# Patient Record
Sex: FEMALE | Race: WHITE | Employment: FULL TIME | URBAN - METROPOLITAN AREA
[De-identification: names, ages, dates, MRNs, and addresses within clinical notes are randomized per-mention and may not be internally consistent; named-entity substitution may affect disease eponyms.]

---

## 2017-02-28 ENCOUNTER — OFFICE VISIT (OUTPATIENT)
Dept: DERMATOLOGY | Age: 42
End: 2017-02-28

## 2017-02-28 DIAGNOSIS — L72.0 EPIDERMAL CYST: ICD-10-CM

## 2017-02-28 DIAGNOSIS — D22.9 MULTIPLE NEVI: Primary | ICD-10-CM

## 2017-02-28 DIAGNOSIS — D18.00 ANGIOMA: ICD-10-CM

## 2017-02-28 DIAGNOSIS — D48.5 NEOPLASM OF UNCERTAIN BEHAVIOR OF SKIN: ICD-10-CM

## 2017-02-28 PROCEDURE — 99203 OFFICE O/P NEW LOW 30 MIN: CPT | Performed by: DERMATOLOGY

## 2017-02-28 PROCEDURE — 11100 PR BIOPSY OF SKIN LESION: CPT | Performed by: DERMATOLOGY

## 2017-02-28 RX ORDER — LEVOTHYROXINE SODIUM 0.03 MG/1
25 TABLET ORAL DAILY
COMMUNITY
End: 2021-11-23 | Stop reason: SDUPTHER

## 2017-03-01 ENCOUNTER — HOSPITAL ENCOUNTER (OUTPATIENT)
Dept: OTHER | Age: 42
Discharge: HOME | End: 2017-03-01
Attending: OBSTETRICS & GYNECOLOGY | Admitting: OBSTETRICS & GYNECOLOGY

## 2017-03-01 LAB
T4 FREE: 1.2 NG/DL (ref 0.9–1.8)
TSH SERPL DL<=0.05 MIU/L-ACNC: 4.39 UIU/ML (ref 0.27–4.2)

## 2017-03-06 ENCOUNTER — TELEPHONE (OUTPATIENT)
Dept: DERMATOLOGY | Age: 42
End: 2017-03-06

## 2017-07-10 ENCOUNTER — PROCEDURE VISIT (OUTPATIENT)
Dept: DERMATOLOGY | Age: 42
End: 2017-07-10

## 2017-07-10 DIAGNOSIS — L72.0 EPIDERMAL CYST OF FACE: Primary | ICD-10-CM

## 2017-07-10 DIAGNOSIS — R20.9 DISTURBANCE OF SKIN SENSATION: ICD-10-CM

## 2017-07-10 PROCEDURE — 11441 EXC FACE-MM B9+MARG 0.6-1 CM: CPT | Performed by: DERMATOLOGY

## 2017-07-14 ENCOUNTER — TELEPHONE (OUTPATIENT)
Dept: DERMATOLOGY | Age: 42
End: 2017-07-14

## 2018-04-05 ENCOUNTER — HOSPITAL ENCOUNTER (OUTPATIENT)
Dept: MAMMOGRAPHY | Age: 43
Discharge: HOME | End: 2018-04-05
Admitting: OBSTETRICS & GYNECOLOGY

## 2018-04-05 DIAGNOSIS — Z12.39 BREAST CANCER SCREENING: ICD-10-CM

## 2018-04-05 DIAGNOSIS — N63.21 BREAST LUMP ON LEFT SIDE AT 1 O'CLOCK POSITION: ICD-10-CM

## 2018-04-13 ENCOUNTER — OFFICE VISIT (OUTPATIENT)
Dept: ORTHOPEDIC SURGERY | Age: 43
End: 2018-04-13

## 2018-04-13 VITALS
HEART RATE: 68 BPM | DIASTOLIC BLOOD PRESSURE: 71 MMHG | HEIGHT: 64 IN | BODY MASS INDEX: 27.31 KG/M2 | WEIGHT: 160 LBS | SYSTOLIC BLOOD PRESSURE: 134 MMHG

## 2018-04-13 DIAGNOSIS — M22.2X2 PATELLOFEMORAL SYNDROME, BILATERAL: ICD-10-CM

## 2018-04-13 DIAGNOSIS — M25.561 PAIN IN BOTH KNEES, UNSPECIFIED CHRONICITY: Primary | ICD-10-CM

## 2018-04-13 DIAGNOSIS — M25.562 PAIN IN BOTH KNEES, UNSPECIFIED CHRONICITY: Primary | ICD-10-CM

## 2018-04-13 DIAGNOSIS — M22.2X1 PATELLOFEMORAL SYNDROME, BILATERAL: ICD-10-CM

## 2018-04-13 PROCEDURE — 99213 OFFICE O/P EST LOW 20 MIN: CPT | Performed by: PHYSICIAN ASSISTANT

## 2018-04-13 RX ORDER — MELOXICAM 15 MG/1
TABLET ORAL
Qty: 30 TABLET | Refills: 3 | Status: SHIPPED | OUTPATIENT
Start: 2018-04-13 | End: 2020-09-18

## 2018-05-19 ENCOUNTER — EMPLOYEE WELLNESS (OUTPATIENT)
Dept: OTHER | Age: 43
End: 2018-05-19

## 2018-05-19 LAB
CHOLESTEROL, TOTAL: 182 MG/DL (ref 0–199)
GLUCOSE BLD-MCNC: 84 MG/DL (ref 70–99)
HDLC SERPL-MCNC: 45 MG/DL (ref 40–60)
LDL CHOLESTEROL CALCULATED: 123 MG/DL
TRIGL SERPL-MCNC: 71 MG/DL (ref 0–150)

## 2018-05-29 VITALS — WEIGHT: 162 LBS | BODY MASS INDEX: 27.81 KG/M2

## 2018-11-19 ENCOUNTER — OFFICE VISIT (OUTPATIENT)
Dept: ORTHOPEDIC SURGERY | Age: 43
End: 2018-11-19
Payer: COMMERCIAL

## 2018-11-19 VITALS
SYSTOLIC BLOOD PRESSURE: 127 MMHG | HEART RATE: 66 BPM | WEIGHT: 164 LBS | BODY MASS INDEX: 28 KG/M2 | HEIGHT: 64 IN | DIASTOLIC BLOOD PRESSURE: 83 MMHG

## 2018-11-19 DIAGNOSIS — M25.512 PAIN OF LEFT SHOULDER REGION: Primary | ICD-10-CM

## 2018-11-19 DIAGNOSIS — M75.82 ROTATOR CUFF TENDINITIS, LEFT: ICD-10-CM

## 2018-11-19 PROCEDURE — 99214 OFFICE O/P EST MOD 30 MIN: CPT | Performed by: ORTHOPAEDIC SURGERY

## 2018-11-19 NOTE — PROGRESS NOTES
CHIEF COMPLAINT:    Chief Complaint   Patient presents with    Shoulder Pain     LEFT SHOULDER PAIN FOR A FEW MONTHS. NO JYOTI. HISTORY OF PRESENT ILLNESS:                The patient is a 37 y.o. female presents to clinic for evaluation of left shoulder pain. This pain began a few months ago with no injury. The symptoms have been gradually worsening with time. She points to the lateral aspect of the shoulders the primary location for her pain. Pain is worsened with overhead movements and external rotation. She does admit to some sleep disturbances. She is left-handed. Past Medical History:   Diagnosis Date    Anemia     iron supplement    Headache(784.0)     Herpes simplex without mention of complication     last outbreak 2 months ago      Hypothyroidism           The pain assessment was noted & is as follows:  Pain Assessment  Location of Pain: Shoulder  Location Modifiers: Left  Severity of Pain: 6  Quality of Pain: Aching  Duration of Pain: Persistent  Frequency of Pain: Constant]      Work Status/Functionality: She works as a respiratory therapist at [employer]. Past Medical History: Medical history form was reviewed today & can be found in the media tab  Past Medical History:   Diagnosis Date    Anemia     iron supplement    Headache(784.0)     Herpes simplex without mention of complication     last outbreak 2 months ago      Hypothyroidism       Past Surgical History:     Past Surgical History:   Procedure Laterality Date    CHOLECYSTECTOMY, LAPAROSCOPIC  2007    DILATION AND CURETTAGE OF UTERUS  2009    HERNIA REPAIR      KNEE SURGERY  2005    rt knee surgery     Current Medications:     Current Outpatient Prescriptions:     meloxicam (MOBIC) 15 MG tablet, 1 po qd, Disp: 30 tablet, Rfl: 3    levothyroxine (SYNTHROID) 25 MCG tablet, Take 25 mcg by mouth Daily, Disp: , Rfl:     rizatriptan (MAXALT) 10 MG tablet, Take 10 mg by mouth once as needed for Migraine.  May repeat

## 2018-11-29 ENCOUNTER — HOSPITAL ENCOUNTER (OUTPATIENT)
Dept: PHYSICAL THERAPY | Age: 43
Setting detail: THERAPIES SERIES
Discharge: HOME | End: 2018-11-29
Payer: COMMERCIAL

## 2018-11-29 PROCEDURE — G8984 CARRY CURRENT STATUS: HCPCS | Performed by: PHYSICAL THERAPIST

## 2018-11-29 PROCEDURE — 97161 PT EVAL LOW COMPLEX 20 MIN: CPT | Performed by: PHYSICAL THERAPIST

## 2018-11-29 PROCEDURE — 97110 THERAPEUTIC EXERCISES: CPT | Performed by: PHYSICAL THERAPIST

## 2018-11-29 PROCEDURE — G8985 CARRY GOAL STATUS: HCPCS | Performed by: PHYSICAL THERAPIST

## 2018-11-29 NOTE — PLAN OF CARE
Limitation: Carrying, moving and handling objects  Carrying, Moving and Handling Objects Current Status (): At least 80 percent but less than 100 percent impaired, limited or restricted  Carrying, Moving and Handling Objects Goal Status (): At least 20 percent but less than 40 percent impaired, limited or restricted    Pain Scale: 3-8/10  Easing factors: no relief. Provocative factors: sleeping, overhead movements, reaching, position changes, self care, lifting, driving. Type: []Constant   []Intermittent  []Radiating []Localized []other:     Numbness/Tingling: intermittent. Occupation/School: Respiratory Therapy. Living Status/Prior Level of Function: Independent with ADLs and IADLs, cardio, walking, lift weights, swim, bike. OBJECTIVE:     CERV ROM     Cervical Flexion 40    Cervical Extension 40    Cervical SB 30 30   Cervical rotation          ROM Left Right   Shoulder Flex 151  AROM    Shoulder Abd 130  AROM    Shoulder ER 85    Shoulder IR 64                   Strength  Left Right   Shoulder Flex 3    Shoulder Scap 3    Shoulder ER 5    Shoulder IR 5    Empty can 3+             Reflexes/Sensation:    [x]Dermatomes/Myotomes intact    []Reflexes equal and normal bilaterally   []Other:    Joint mobility:    [x]Normal    []Hypo   []Hyper    Palpation: tender along acromion    Functional Mobility/Transfers: Independent    Posture: guarded. Bandages/Dressings/Incisions: n/a    Gait: (include devices/WB status): WNL    Orthopedic Special Tests: + empty can.  + neer's                         [x] Patient history, allergies, meds reviewed. Medical chart reviewed. See intake form. Review Of Systems (ROS):  [x]Performed Review of systems (Integumentary, CardioPulmonary, Neurological) by intake and observation. Intake form has been scanned into medical record.  Patient has been instructed to contact their primary care physician regarding ROS issues if not already being addressed at restricted    ASSESSMENT:   Functional Impairments   []Noted spinal or UE joint hypomobility   []Noted spinal or UE joint hypermobility   [x]Decreased UE functional ROM   [x]Decreased UE functional strength   []Abnormal reflexes/sensation/myotomal/dermatomal deficits   [x]Decreased RC/scapular/core strength and neuromuscular control   []other:      Functional Activity Limitations (from functional questionnaire and intake)   [x]Reduced ability to tolerate prolonged functional positions   [x]Reduced ability or difficulty with changes of positions or transfers between positions   [x]Reduced ability to maintain good posture and demonstrate good body mechanics with sitting, bending, and lifting   [] Reduced ability or tolerance with driving and/or computer work   [x]Reduced ability to sleep   [x]Reduced ability to perform lifting, reaching, carrying tasks   [x]Reduced ability to tolerate impact through UE   [x]Reduced ability to reach behind back   [x]Reduced ability to  or hold objects   []Reduced ability to throw or toss an object   []other:    Participation Restrictions   [x]Reduced participation in self care activities   [x]Reduced participation in home management activities   [x]Reduced participation in work activities   [x]Reduced participation in social activities. [x]Reduced participation in sport/recreation activities. Classification:   []Signs/symptoms consistent with post-surgical status including decreased ROM, strength and function.   []Signs/symptoms consistent with joint sprain/strain   [x]Signs/symptoms consistent with shoulder impingement   [x]Signs/symptoms consistent with shoulder/elbow/wrist tendinopathy   []Signs/symptoms consistent with Rotator cuff tear   []Signs/symptoms consistent with labral tear   []Signs/symptoms consistent with postural dysfunction    []Signs/symptoms consistent with Glenohumeral IR Deficit - <45 degrees   []Signs/symptoms consistent with facet dysfunction of cervical/thoracic spine    []Signs/symptoms consistent with pathology which may benefit from Dry needling     []other:     Prognosis/Rehab Potential:      []Excellent   [x]Good    []Fair   []Poor    Tolerance of evaluation/treatment:    []Excellent   [x]Good    []Fair   []Poor  Physical Therapy Evaluation Complexity Justification  [x] A history of present problem with:  [x] no personal factors and/or comorbidities that impact the plan of care;  []1-2 personal factors and/or comorbidities that impact the plan of care  []3 personal factors and/or comorbidities that impact the plan of care  [x] An examination of body systems using standardized tests and measures addressing any of the following: body structures and functions (impairments), activity limitations, and/or participation restrictions;:  [] a total of 1-2 or more elements   [x] a total of 3 or more elements   [] a total of 4 or more elements   [x] A clinical presentation with:  [x] stable and/or uncomplicated characteristics   [] evolving clinical presentation with changing characteristics  [] unstable and unpredictable characteristics;   [x] Clinical decision making of [x] low, [] moderate, [] high complexity using standardized patient assessment instrument and/or measurable assessment of functional outcome. [x] EVAL (LOW) 00367 (typically 20 minutes face-to-face)  [] EVAL (MOD) 47998 (typically 30 minutes face-to-face)  [] EVAL (HIGH) 76442 (typically 45 minutes face-to-face)  [] RE-EVAL       PLAN:  Frequency/Duration:  2 days per week for 4 Weeks:  INTERVENTIONS:  [x] Therapeutic exercise including: strength training, ROM, for Upper extremity and core   [x]  NMR activation and proprioception for UE, scap and Core   [x] Manual therapy as indicated for shoulder, scapula and spine to include: Dry Needling/IASTM, STM, PROM, Gr I-IV mobilizations, manipulation.    [x] Modalities as needed that may include: thermal agents, E-stim, Biofeedback, US,

## 2018-12-04 ENCOUNTER — HOSPITAL ENCOUNTER (OUTPATIENT)
Dept: PHYSICAL THERAPY | Age: 43
Setting detail: THERAPIES SERIES
Discharge: HOME | End: 2018-12-04
Payer: COMMERCIAL

## 2018-12-04 PROCEDURE — 97140 MANUAL THERAPY 1/> REGIONS: CPT | Performed by: PHYSICAL THERAPIST

## 2018-12-04 PROCEDURE — 97110 THERAPEUTIC EXERCISES: CPT | Performed by: PHYSICAL THERAPIST

## 2018-12-04 NOTE — FLOWSHEET NOTE
used Initial score Current Score   Pain Summary VAS 3-8    Functional questionnaire QDash 80    ROM flexion 151     ER 85          IR 64    Strength flexion 3     ER      ABD 3     IR        RESTRICTIONS/PRECAUTIONS:     Exercises/Interventions:   Therapeutic Ex Sets/reps Notes        Supine assisted flex X 10 hep   Supine R/S  4 directions X 20 hep   SBS  NMES  4 min Hep   No $ NMES 4 min iso Hep`   Table slides X 10 hep   pendulum X 10 hep   SL scap set X 10  :05         SB ball roll on table X 20     Bent over row/  ext X 20/   X 20  hep        submax iso   IR/ ER X 10  :05 hep                                 Manual Intervention     PROM/  Joint oscillation 4 min. NMR re-education                                                 Therapeutic Exercise and NMR EXR  [x] (25403) Provided verbal/tactile cueing for activities related to strengthening, flexibility, endurance, ROM  for improvements in scapular, scapulothoracic and UE control with self care, reaching, carrying, lifting, house/yardwork, driving/computer work.    [] (64348) Provided verbal/tactile cueing for activities related to improving balance, coordination, kinesthetic sense, posture, motor skill, proprioception  to assist with  scapular, scapulothoracic and UE control with self care, reaching, carrying, lifting, house/yardwork, driving/computer work. Therapeutic Activities:    [] (82160 or 97190) Provided verbal/tactile cueing for activities related to improving balance, coordination, kinesthetic sense, posture, motor skill, proprioception and motor activation to allow for proper function of scapular, scapulothoracic and UE control with self care, carrying, lifting, driving/computer work.      Home Exercise Program:    [x] (79194) Reviewed/Progressed HEP activities related to strengthening, flexibility, endurance, ROM of scapular, scapulothoracic and UE control with self care, reaching, carrying, lifting,

## 2018-12-06 ENCOUNTER — HOSPITAL ENCOUNTER (OUTPATIENT)
Dept: PHYSICAL THERAPY | Age: 43
Setting detail: THERAPIES SERIES
Discharge: HOME | End: 2018-12-06
Payer: COMMERCIAL

## 2018-12-06 PROCEDURE — 97140 MANUAL THERAPY 1/> REGIONS: CPT | Performed by: PHYSICAL THERAPIST

## 2018-12-06 PROCEDURE — 97110 THERAPEUTIC EXERCISES: CPT | Performed by: PHYSICAL THERAPIST

## 2018-12-06 NOTE — FLOWSHEET NOTE
Summary VAS 3-8 4-5   Functional questionnaire QDash 80    ROM flexion 151     ER 85          IR 64    Strength flexion 3     ER      ABD 3     IR        RESTRICTIONS/PRECAUTIONS:     Exercises/Interventions:     See ATC sheet  Therapeutic Ex Sets/reps Notes        Supine assisted flex X 10 hep   Supine R/S  4 directions 3#   X 20 hep   SBS  NMES  4 min Hep   No $ NMES 4 min iso Hep`   hep   hep   SL scap set X 10  :05    SL ERC 1# 2 x 10    SL abd to 45 X 20    Prone ext X 15     SB ball roll on table X 20     Bent over row/  ext X 20/   X 20  hep        submax iso   IR/ ER X 10  :05 hep                                 Manual Intervention     PROM/  Joint mobs inf and post 8 min. NMR re-education                   Therapeutic Exercise and NMR EXR  [x] (91460) Provided verbal/tactile cueing for activities related to strengthening, flexibility, endurance, ROM  for improvements in scapular, scapulothoracic and UE control with self care, reaching, carrying, lifting, house/yardwork, driving/computer work.    [] (01045) Provided verbal/tactile cueing for activities related to improving balance, coordination, kinesthetic sense, posture, motor skill, proprioception  to assist with  scapular, scapulothoracic and UE control with self care, reaching, carrying, lifting, house/yardwork, driving/computer work. Therapeutic Activities:    [] (81665 or 65393) Provided verbal/tactile cueing for activities related to improving balance, coordination, kinesthetic sense, posture, motor skill, proprioception and motor activation to allow for proper function of scapular, scapulothoracic and UE control with self care, carrying, lifting, driving/computer work.      Home Exercise Program:    [x] (23057) Reviewed/Progressed HEP activities related to strengthening, flexibility, endurance, ROM of scapular, scapulothoracic and UE control with self care, reaching, carrying, lifting, house/yardwork, for proper joint functioning as indicated by patients Functional Deficits. 3. Patient will demonstrate an increase in Strength to 4+/5 in flex and scap to allow for proper functional mobility as indicated by patients Functional Deficits. 4. Patient will return to sleeping without increased symptoms or restriction. 5. Able to don and doff bra  And style hair without left shoulder pain. New or Updated Goals (if applicable):  [x] No change to goals established upon initial eval/last progress note:  New Goals:    Progression Towards Functional goals:   [] Patient is progressing as expected towards functional goals listed. [] Progression is slowed due to complexities listed. [] Progression has been slowed due to co-morbidities.   [x] Plan just implemented, too soon to assess goals progression  [] Other:     ASSESSMENT:    [] Improvement noted relative to goals:  [] No Improvement noted related to goals:  Summary/Patient's response to treatment: See Eval    Treatment/Activity Tolerance:  [x] Patient tolerated treatment well [] Patient limited by fatique  [] Patient limited by pain  [] Patient limited by other medical complications  [] Other:     Prognosis: [x] Good [] Fair  [] Poor    Patient Requires Follow-up: [x] Yes  [] No    PLAN: See eval  [x] Continue per plan of care [] Alter current plan (see comments)  [] Plan of care initiated [] Hold pending MD visit [] Discharge    Electronically signed by: Tarri Litten, PT

## 2018-12-11 ENCOUNTER — APPOINTMENT (OUTPATIENT)
Dept: PHYSICAL THERAPY | Age: 43
End: 2018-12-11
Payer: COMMERCIAL

## 2018-12-17 ENCOUNTER — APPOINTMENT (OUTPATIENT)
Dept: PHYSICAL THERAPY | Age: 43
End: 2018-12-17
Payer: COMMERCIAL

## 2019-05-13 ENCOUNTER — EMPLOYEE WELLNESS (OUTPATIENT)
Dept: OTHER | Age: 44
End: 2019-05-13

## 2019-05-13 LAB
CHOLESTEROL, TOTAL: 188 MG/DL (ref 0–199)
GLUCOSE BLD-MCNC: 83 MG/DL (ref 70–99)
HDLC SERPL-MCNC: 52 MG/DL (ref 40–60)
LDL CHOLESTEROL CALCULATED: 115 MG/DL
TRIGL SERPL-MCNC: 103 MG/DL (ref 0–150)

## 2019-05-28 VITALS — WEIGHT: 169 LBS | BODY MASS INDEX: 28.99 KG/M2

## 2019-10-10 ENCOUNTER — HOSPITAL ENCOUNTER (OUTPATIENT)
Dept: WOMENS IMAGING | Age: 44
Discharge: HOME | End: 2019-10-10
Payer: COMMERCIAL

## 2019-10-10 DIAGNOSIS — Z12.31 ENCOUNTER FOR SCREENING MAMMOGRAM FOR MALIGNANT NEOPLASM OF BREAST: ICD-10-CM

## 2019-10-10 PROCEDURE — 77067 SCR MAMMO BI INCL CAD: CPT

## 2019-10-23 ENCOUNTER — HOSPITAL ENCOUNTER (OUTPATIENT)
Dept: WOMENS IMAGING | Age: 44
Discharge: HOME | End: 2019-10-23
Payer: COMMERCIAL

## 2019-10-23 DIAGNOSIS — R92.8 ABNORMAL MAMMOGRAM: ICD-10-CM

## 2019-10-23 PROCEDURE — G0279 TOMOSYNTHESIS, MAMMO: HCPCS

## 2019-10-23 PROCEDURE — 76642 ULTRASOUND BREAST LIMITED: CPT

## 2020-02-25 ENCOUNTER — HOSPITAL ENCOUNTER (OUTPATIENT)
Age: 45
Discharge: HOME | End: 2020-02-25
Payer: COMMERCIAL

## 2020-02-25 LAB
BASOPHILS ABSOLUTE: 0 K/UL (ref 0–0.2)
BASOPHILS RELATIVE PERCENT: 0.6 %
EOSINOPHILS ABSOLUTE: 0.1 K/UL (ref 0–0.6)
EOSINOPHILS RELATIVE PERCENT: 1.7 %
HCT VFR BLD CALC: 41.1 % (ref 36–48)
HEMOGLOBIN: 13.5 G/DL (ref 12–16)
LYMPHOCYTES ABSOLUTE: 1.4 K/UL (ref 1–5.1)
LYMPHOCYTES RELATIVE PERCENT: 21.8 %
MCH RBC QN AUTO: 28.1 PG (ref 26–34)
MCHC RBC AUTO-ENTMCNC: 32.8 G/DL (ref 31–36)
MCV RBC AUTO: 85.7 FL (ref 80–100)
MONOCYTES ABSOLUTE: 0.6 K/UL (ref 0–1.3)
MONOCYTES RELATIVE PERCENT: 9 %
NEUTROPHILS ABSOLUTE: 4.3 K/UL (ref 1.7–7.7)
NEUTROPHILS RELATIVE PERCENT: 66.9 %
PDW BLD-RTO: 18.2 % (ref 12.4–15.4)
PLATELET # BLD: 258 K/UL (ref 135–450)
PMV BLD AUTO: 9.1 FL (ref 5–10.5)
RBC # BLD: 4.8 M/UL (ref 4–5.2)
T4 FREE: 1.1 NG/DL (ref 0.9–1.8)
TSH SERPL DL<=0.05 MIU/L-ACNC: 8.01 UIU/ML (ref 0.27–4.2)
WBC # BLD: 6.4 K/UL (ref 4–11)

## 2020-02-25 PROCEDURE — 36415 COLL VENOUS BLD VENIPUNCTURE: CPT

## 2020-02-25 PROCEDURE — 84439 ASSAY OF FREE THYROXINE: CPT

## 2020-02-25 PROCEDURE — 85025 COMPLETE CBC W/AUTO DIFF WBC: CPT

## 2020-02-25 PROCEDURE — 84443 ASSAY THYROID STIM HORMONE: CPT

## 2020-05-29 ENCOUNTER — HOSPITAL ENCOUNTER (OUTPATIENT)
Age: 45
Discharge: HOME | End: 2020-05-29
Payer: COMMERCIAL

## 2020-05-29 ENCOUNTER — HOSPITAL ENCOUNTER (OUTPATIENT)
Dept: GENERAL RADIOLOGY | Age: 45
Discharge: HOME | End: 2020-05-29
Payer: COMMERCIAL

## 2020-05-29 PROCEDURE — 73630 X-RAY EXAM OF FOOT: CPT

## 2020-09-03 ENCOUNTER — HOSPITAL ENCOUNTER (OUTPATIENT)
Dept: MRI IMAGING | Age: 45
Discharge: HOME | End: 2020-09-03
Payer: COMMERCIAL

## 2020-09-03 PROCEDURE — 73721 MRI JNT OF LWR EXTRE W/O DYE: CPT

## 2020-09-16 ENCOUNTER — HOSPITAL ENCOUNTER (OUTPATIENT)
Age: 45
Discharge: HOME | End: 2020-09-16
Payer: COMMERCIAL

## 2020-09-16 PROCEDURE — U0003 INFECTIOUS AGENT DETECTION BY NUCLEIC ACID (DNA OR RNA); SEVERE ACUTE RESPIRATORY SYNDROME CORONAVIRUS 2 (SARS-COV-2) (CORONAVIRUS DISEASE [COVID-19]), AMPLIFIED PROBE TECHNIQUE, MAKING USE OF HIGH THROUGHPUT TECHNOLOGIES AS DESCRIBED BY CMS-2020-01-R: HCPCS

## 2020-09-17 ENCOUNTER — ANESTHESIA EVENT (OUTPATIENT)
Dept: OPERATING ROOM | Age: 45
End: 2020-09-17
Payer: COMMERCIAL

## 2020-09-18 LAB — SARS-COV-2, NAA: NOT DETECTED

## 2020-09-18 RX ORDER — FLUTICASONE PROPIONATE 50 MCG
1 SPRAY, SUSPENSION (ML) NASAL DAILY
COMMUNITY

## 2020-09-18 RX ORDER — OMEPRAZOLE 20 MG/1
40 CAPSULE, DELAYED RELEASE ORAL DAILY
Status: ON HOLD | COMMUNITY
End: 2020-09-22

## 2020-09-18 RX ORDER — FERROUS SULFATE 325(65) MG
325 TABLET ORAL 2 TIMES DAILY
COMMUNITY
End: 2022-01-27

## 2020-09-18 RX ORDER — ALBUTEROL SULFATE 90 UG/1
2 AEROSOL, METERED RESPIRATORY (INHALATION) EVERY 6 HOURS PRN
COMMUNITY

## 2020-09-22 ENCOUNTER — HOSPITAL ENCOUNTER (OUTPATIENT)
Age: 45
Setting detail: OUTPATIENT SURGERY
Discharge: HOME | End: 2020-09-22
Attending: PODIATRIST | Admitting: PODIATRIST
Payer: COMMERCIAL

## 2020-09-22 ENCOUNTER — ANESTHESIA (OUTPATIENT)
Dept: OPERATING ROOM | Age: 45
End: 2020-09-22
Payer: COMMERCIAL

## 2020-09-22 VITALS
HEIGHT: 64 IN | TEMPERATURE: 98 F | HEART RATE: 60 BPM | DIASTOLIC BLOOD PRESSURE: 68 MMHG | BODY MASS INDEX: 23.9 KG/M2 | RESPIRATION RATE: 14 BRPM | SYSTOLIC BLOOD PRESSURE: 103 MMHG | WEIGHT: 140 LBS | OXYGEN SATURATION: 100 %

## 2020-09-22 VITALS
RESPIRATION RATE: 16 BRPM | SYSTOLIC BLOOD PRESSURE: 105 MMHG | OXYGEN SATURATION: 100 % | DIASTOLIC BLOOD PRESSURE: 57 MMHG

## 2020-09-22 PROBLEM — M25.371 RIGHT ANKLE INSTABILITY: Status: ACTIVE | Noted: 2020-09-22

## 2020-09-22 LAB — PREGNANCY, URINE: NEGATIVE

## 2020-09-22 PROCEDURE — 2500000003 HC RX 250 WO HCPCS: Performed by: ANESTHESIOLOGY

## 2020-09-22 PROCEDURE — 2780000010 HC IMPLANT OTHER: Performed by: PODIATRIST

## 2020-09-22 PROCEDURE — 88305 TISSUE EXAM BY PATHOLOGIST: CPT

## 2020-09-22 PROCEDURE — 7100000000 HC PACU RECOVERY - FIRST 15 MIN: Performed by: PODIATRIST

## 2020-09-22 PROCEDURE — 7100000001 HC PACU RECOVERY - ADDTL 15 MIN: Performed by: PODIATRIST

## 2020-09-22 PROCEDURE — 2709999900 HC NON-CHARGEABLE SUPPLY: Performed by: PODIATRIST

## 2020-09-22 PROCEDURE — 3600000003 HC SURGERY LEVEL 3 BASE: Performed by: PODIATRIST

## 2020-09-22 PROCEDURE — 6360000002 HC RX W HCPCS: Performed by: NURSE ANESTHETIST, CERTIFIED REGISTERED

## 2020-09-22 PROCEDURE — 76942 ECHO GUIDE FOR BIOPSY: CPT | Performed by: ANESTHESIOLOGY

## 2020-09-22 PROCEDURE — C1713 ANCHOR/SCREW BN/BN,TIS/BN: HCPCS | Performed by: PODIATRIST

## 2020-09-22 PROCEDURE — 3700000000 HC ANESTHESIA ATTENDED CARE: Performed by: PODIATRIST

## 2020-09-22 PROCEDURE — 2580000003 HC RX 258: Performed by: ANESTHESIOLOGY

## 2020-09-22 PROCEDURE — 6360000002 HC RX W HCPCS: Performed by: PODIATRIST

## 2020-09-22 PROCEDURE — 3700000001 HC ADD 15 MINUTES (ANESTHESIA): Performed by: PODIATRIST

## 2020-09-22 PROCEDURE — 64447 NJX AA&/STRD FEMORAL NRV IMG: CPT | Performed by: ANESTHESIOLOGY

## 2020-09-22 PROCEDURE — 84703 CHORIONIC GONADOTROPIN ASSAY: CPT

## 2020-09-22 PROCEDURE — 7100000011 HC PHASE II RECOVERY - ADDTL 15 MIN: Performed by: PODIATRIST

## 2020-09-22 PROCEDURE — 6360000002 HC RX W HCPCS: Performed by: ANESTHESIOLOGY

## 2020-09-22 PROCEDURE — 2580000003 HC RX 258: Performed by: PODIATRIST

## 2020-09-22 PROCEDURE — 3600000013 HC SURGERY LEVEL 3 ADDTL 15MIN: Performed by: PODIATRIST

## 2020-09-22 PROCEDURE — 7100000010 HC PHASE II RECOVERY - FIRST 15 MIN: Performed by: PODIATRIST

## 2020-09-22 PROCEDURE — 2500000003 HC RX 250 WO HCPCS: Performed by: NURSE ANESTHETIST, CERTIFIED REGISTERED

## 2020-09-22 DEVICE — IMPLANTABLE DEVICE: Type: IMPLANTABLE DEVICE | Site: ANKLE | Status: FUNCTIONAL

## 2020-09-22 DEVICE — ANCHOR SUT NDL DX FIBERTAK: Type: IMPLANTABLE DEVICE | Site: ANKLE | Status: FUNCTIONAL

## 2020-09-22 DEVICE — DEVICE GRFT FIX FOR LIGMNT AUG ANCHR REP PEEK INT BRAC: Type: IMPLANTABLE DEVICE | Site: ANKLE | Status: FUNCTIONAL

## 2020-09-22 RX ORDER — PROPOFOL 10 MG/ML
INJECTION, EMULSION INTRAVENOUS PRN
Status: DISCONTINUED | OUTPATIENT
Start: 2020-09-22 | End: 2020-09-22 | Stop reason: SDUPTHER

## 2020-09-22 RX ORDER — RIVAROXABAN 10 MG/1
10 TABLET, FILM COATED ORAL
Qty: 14 TABLET | Refills: 0 | Status: SHIPPED | OUTPATIENT
Start: 2020-09-22 | End: 2021-11-23 | Stop reason: ALTCHOICE

## 2020-09-22 RX ORDER — SODIUM CHLORIDE 0.9 % (FLUSH) 0.9 %
10 SYRINGE (ML) INJECTION EVERY 12 HOURS SCHEDULED
Status: DISCONTINUED | OUTPATIENT
Start: 2020-09-22 | End: 2020-09-22 | Stop reason: HOSPADM

## 2020-09-22 RX ORDER — GLYCOPYRROLATE 0.2 MG/ML
INJECTION INTRAMUSCULAR; INTRAVENOUS PRN
Status: DISCONTINUED | OUTPATIENT
Start: 2020-09-22 | End: 2020-09-22 | Stop reason: SDUPTHER

## 2020-09-22 RX ORDER — MIDAZOLAM HYDROCHLORIDE 1 MG/ML
INJECTION INTRAMUSCULAR; INTRAVENOUS
Status: COMPLETED
Start: 2020-09-22 | End: 2020-09-22

## 2020-09-22 RX ORDER — MAGNESIUM HYDROXIDE 1200 MG/15ML
LIQUID ORAL CONTINUOUS PRN
Status: COMPLETED | OUTPATIENT
Start: 2020-09-22 | End: 2020-09-22

## 2020-09-22 RX ORDER — BUPIVACAINE HYDROCHLORIDE 2.5 MG/ML
INJECTION, SOLUTION EPIDURAL; INFILTRATION; INTRACAUDAL
Status: COMPLETED
Start: 2020-09-22 | End: 2020-09-22

## 2020-09-22 RX ORDER — DEXAMETHASONE SODIUM PHOSPHATE 4 MG/ML
INJECTION, SOLUTION INTRA-ARTICULAR; INTRALESIONAL; INTRAMUSCULAR; INTRAVENOUS; SOFT TISSUE PRN
Status: DISCONTINUED | OUTPATIENT
Start: 2020-09-22 | End: 2020-09-22 | Stop reason: SDUPTHER

## 2020-09-22 RX ORDER — OXYCODONE HYDROCHLORIDE AND ACETAMINOPHEN 5; 325 MG/1; MG/1
1 TABLET ORAL EVERY 6 HOURS PRN
Qty: 20 TABLET | Refills: 0 | Status: SHIPPED | OUTPATIENT
Start: 2020-09-22 | End: 2020-09-27

## 2020-09-22 RX ORDER — FENTANYL CITRATE 50 UG/ML
INJECTION, SOLUTION INTRAMUSCULAR; INTRAVENOUS PRN
Status: DISCONTINUED | OUTPATIENT
Start: 2020-09-22 | End: 2020-09-22 | Stop reason: SDUPTHER

## 2020-09-22 RX ORDER — LIDOCAINE HYDROCHLORIDE 20 MG/ML
INJECTION, SOLUTION INFILTRATION; PERINEURAL PRN
Status: DISCONTINUED | OUTPATIENT
Start: 2020-09-22 | End: 2020-09-22 | Stop reason: SDUPTHER

## 2020-09-22 RX ORDER — SODIUM CHLORIDE, SODIUM LACTATE, POTASSIUM CHLORIDE, CALCIUM CHLORIDE 600; 310; 30; 20 MG/100ML; MG/100ML; MG/100ML; MG/100ML
INJECTION, SOLUTION INTRAVENOUS CONTINUOUS
Status: DISCONTINUED | OUTPATIENT
Start: 2020-09-22 | End: 2020-09-22 | Stop reason: HOSPADM

## 2020-09-22 RX ORDER — BUPIVACAINE HYDROCHLORIDE 5 MG/ML
INJECTION, SOLUTION EPIDURAL; INTRACAUDAL PRN
Status: DISCONTINUED | OUTPATIENT
Start: 2020-09-22 | End: 2020-09-22 | Stop reason: SDUPTHER

## 2020-09-22 RX ORDER — BUPIVACAINE HYDROCHLORIDE 5 MG/ML
INJECTION, SOLUTION EPIDURAL; INTRACAUDAL
Status: COMPLETED
Start: 2020-09-22 | End: 2020-09-22

## 2020-09-22 RX ORDER — BUPIVACAINE HYDROCHLORIDE 2.5 MG/ML
INJECTION, SOLUTION EPIDURAL; INFILTRATION; INTRACAUDAL PRN
Status: DISCONTINUED | OUTPATIENT
Start: 2020-09-22 | End: 2020-09-22 | Stop reason: SDUPTHER

## 2020-09-22 RX ORDER — SODIUM CHLORIDE 0.9 % (FLUSH) 0.9 %
10 SYRINGE (ML) INJECTION PRN
Status: DISCONTINUED | OUTPATIENT
Start: 2020-09-22 | End: 2020-09-22 | Stop reason: HOSPADM

## 2020-09-22 RX ORDER — MIDAZOLAM HYDROCHLORIDE 1 MG/ML
INJECTION INTRAMUSCULAR; INTRAVENOUS PRN
Status: DISCONTINUED | OUTPATIENT
Start: 2020-09-22 | End: 2020-09-22 | Stop reason: SDUPTHER

## 2020-09-22 RX ORDER — EPINEPHRINE 1 MG/ML
INJECTION, SOLUTION, CONCENTRATE INTRAVENOUS
Status: DISCONTINUED
Start: 2020-09-22 | End: 2020-09-22 | Stop reason: HOSPADM

## 2020-09-22 RX ORDER — ONDANSETRON 2 MG/ML
INJECTION INTRAMUSCULAR; INTRAVENOUS PRN
Status: DISCONTINUED | OUTPATIENT
Start: 2020-09-22 | End: 2020-09-22 | Stop reason: SDUPTHER

## 2020-09-22 RX ADMIN — PHENYLEPHRINE HYDROCHLORIDE 50 MCG: 10 INJECTION INTRAVENOUS at 10:00

## 2020-09-22 RX ADMIN — GLYCOPYRROLATE 0.2 MG: 0.2 INJECTION, SOLUTION INTRAMUSCULAR; INTRAVENOUS at 09:24

## 2020-09-22 RX ADMIN — PROPOFOL 150 MG: 10 INJECTION, EMULSION INTRAVENOUS at 08:59

## 2020-09-22 RX ADMIN — FENTANYL CITRATE 50 MCG: 50 INJECTION INTRAMUSCULAR; INTRAVENOUS at 08:59

## 2020-09-22 RX ADMIN — LIDOCAINE HYDROCHLORIDE 0.1 ML: 10 INJECTION, SOLUTION EPIDURAL; INFILTRATION; INTRACAUDAL; PERINEURAL at 08:14

## 2020-09-22 RX ADMIN — BUPIVACAINE HYDROCHLORIDE 15 ML: 5 INJECTION, SOLUTION EPIDURAL; INTRACAUDAL; PERINEURAL at 08:41

## 2020-09-22 RX ADMIN — DEXAMETHASONE SODIUM PHOSPHATE 8 MG: 4 INJECTION, SOLUTION INTRAMUSCULAR; INTRAVENOUS at 09:06

## 2020-09-22 RX ADMIN — Medication 2 G: at 08:53

## 2020-09-22 RX ADMIN — SODIUM CHLORIDE, POTASSIUM CHLORIDE, SODIUM LACTATE AND CALCIUM CHLORIDE: 600; 310; 30; 20 INJECTION, SOLUTION INTRAVENOUS at 09:32

## 2020-09-22 RX ADMIN — LIDOCAINE HYDROCHLORIDE 30 MG: 20 INJECTION, SOLUTION INFILTRATION; PERINEURAL at 08:59

## 2020-09-22 RX ADMIN — SODIUM CHLORIDE, POTASSIUM CHLORIDE, SODIUM LACTATE AND CALCIUM CHLORIDE: 600; 310; 30; 20 INJECTION, SOLUTION INTRAVENOUS at 07:58

## 2020-09-22 RX ADMIN — PHENYLEPHRINE HYDROCHLORIDE 50 MCG: 10 INJECTION INTRAVENOUS at 09:46

## 2020-09-22 RX ADMIN — FENTANYL CITRATE 25 MCG: 50 INJECTION INTRAMUSCULAR; INTRAVENOUS at 10:33

## 2020-09-22 RX ADMIN — PHENYLEPHRINE HYDROCHLORIDE 100 MCG: 10 INJECTION INTRAVENOUS at 09:31

## 2020-09-22 RX ADMIN — ONDANSETRON 4 MG: 2 INJECTION, SOLUTION INTRAMUSCULAR; INTRAVENOUS at 09:06

## 2020-09-22 RX ADMIN — BUPIVACAINE HYDROCHLORIDE 30 ML: 2.5 INJECTION, SOLUTION EPIDURAL; INFILTRATION; INTRACAUDAL; PERINEURAL at 08:41

## 2020-09-22 RX ADMIN — FENTANYL CITRATE 25 MCG: 50 INJECTION INTRAMUSCULAR; INTRAVENOUS at 10:47

## 2020-09-22 RX ADMIN — MIDAZOLAM 4 MG: 1 INJECTION INTRAMUSCULAR; INTRAVENOUS at 08:41

## 2020-09-22 ASSESSMENT — PULMONARY FUNCTION TESTS
PIF_VALUE: 1
PIF_VALUE: 2
PIF_VALUE: 9
PIF_VALUE: 2
PIF_VALUE: 1
PIF_VALUE: 2
PIF_VALUE: 0
PIF_VALUE: 2
PIF_VALUE: 26
PIF_VALUE: 2
PIF_VALUE: 3
PIF_VALUE: 2
PIF_VALUE: 3
PIF_VALUE: 2
PIF_VALUE: 11
PIF_VALUE: 2
PIF_VALUE: 1
PIF_VALUE: 2
PIF_VALUE: 3
PIF_VALUE: 2
PIF_VALUE: 2
PIF_VALUE: 0
PIF_VALUE: 2
PIF_VALUE: 0
PIF_VALUE: 2

## 2020-09-22 ASSESSMENT — PAIN - FUNCTIONAL ASSESSMENT: PAIN_FUNCTIONAL_ASSESSMENT: 0-10

## 2020-09-22 NOTE — ANESTHESIA PROCEDURE NOTES
Peripheral Block    Patient location during procedure: pre-op  Staffing  Anesthesiologist: Maryanne Camilo MD  Performed: anesthesiologist   Preanesthetic Checklist  Completed: patient identified, site marked, surgical consent, pre-op evaluation, timeout performed, IV checked, risks and benefits discussed, monitors and equipment checked, anesthesia consent given, oxygen available and patient being monitored  Peripheral Block  Patient position: left lateral decubitus  Prep: ChloraPrep  Patient monitoring: cardiac monitor, continuous pulse ox, frequent blood pressure checks and IV access  Block type: Sciatic  Laterality: right  Injection technique: single-shot  Procedures: ultrasound guided and nerve stimulator  Local infiltration: lidocaine  Infiltration strength: 1 %  Dose: 3 mL  Popliteal  Provider prep: mask and sterile gloves  Local infiltration: lidocaine  Needle  Needle type: combined needle/nerve stimulator   Needle gauge: 21 G  Needle length: 10 cm  Needle localization: ultrasound guidance and anatomical landmarks  Assessment  Injection assessment: negative aspiration for heme, no paresthesia on injection and local visualized surrounding nerve on ultrasound  Paresthesia pain: none  Slow fractionated injection: yes  Hemodynamics: stable  Additional Notes  Immediately prior to procedure a \"time out\" was called to verify the correct patient, allergies, laterality, procedure and equipment. Time out performed with  RN    Local Anesthetic: 0.375 %  Bupivacaine plus epi 1 to 200K  Amount: 30 ml  in 5 ml increments after negative aspiration each time.       Reason for block: post-op pain management and at surgeon's request

## 2020-09-22 NOTE — BRIEF OP NOTE
Brief Postoperative Note      Patient: Delmis De Leon  YOB: 1975  MRN: [id number]    Date of Procedure: 9/22/2020    Pre-Op Diagnosis: POSTERIOR TIBIAL TENDON AND FLEXOR DIGITORUM LONGUS TENDON TENOSYNOVITIS, POSTERIOR TIBIAL TENDON DYSFUNCTION RIGHT    Post-Op Diagnosis: POSTERIOR TIBIAL TENDON TENOSYNOVITIS, POSTERIOR TIBIAL TENDON DYSFUNCTION RIGHT       Procedure(s):  MODIFIED BROSTROM PROCEDURE RIGHT ANKLE, TENOSYNOVECTOMY  POSTERIOR TIBIAL TENDON RIGHT, APPLICATION POSTERIOR  SPLINT RIGHT    Surgeon(s):  Maura Juares DPM    Assistant: Jennyfer Bloom, PGY-3; Lucio Tapia MS IV    Anesthesia: General with peripheral nerve block via anesthesia    Hemostasis: Pneumatic thigh tourniquet 325 mmHg for 91 minutes    Estimated Blood Loss (mL): less than 50     Materials: 3-0 Vicryl, 4-0 Vicryl, 4-0 Nylon    Injectables: Pre: None; Post: None    Complications: None    Specimens:   ID Type Source Tests Collected by Time Destination   A :  Tissue Tissue SURGICAL PATHOLOGY [place], [place] 9/22/2020 0930      Implants:  Implant Name Type Inv. Item Serial No.  Lot No. LRB No. Used Action   GRAFT SOFT TISS AMNION DEMINERALIZED BONE - [id number] Bone/Graft/Tissue/Human/Synth GRAFT SOFT TISS AMNION DEMINERALIZED BONE [id number] [employer]  Right 1 Implanted   IMPL LIGAMENT INT BRACE JUMPSTART Fastener IMPL LIGAMENT INT BRACE JUMPSTART  [address] [id number] Right 1 Implanted   [facility] LIGAMENT FIBERTAK DX SUTURE Fastener ANCHOR LIGAMENT FIBERTAK DX SUTURE  [address] [id number] Right 1 Implanted   ANCHOR LIGAMENT FIBERTAK DX SUTURE Fastener ANCHOR LIGAMENT FIBERTAK DX SUTURE  [address] [id number] Right 1 Implanted     Drains: * No LDAs found *    Findings: Hemorrhagic synovitis noted to the posterior tibial tendon;  No longitudinal split tears noted within posterior tibial tendon; Healthy white glistening tissue noted to the posterior tibial tendon; 2 cm avulsion fracture off distal fibula at the level of insertion of Anterior talofibular ligament    Electronically signed by Trish Daigle DPM on 9/22/2020 at 10:58 AM

## 2020-09-22 NOTE — ANESTHESIA PRE PROCEDURE
Department of Anesthesiology  Preprocedure Note       Name:  Kassi Patel   Age:  39 y.o.  :  1975                                          MRN:  [id number]         Date:  2020      Surgeon: Lorna Yates):  Maura Heller DPM    Procedure: Procedure(s):  MODIFIED BROSTROM PROCEDURE RIGHT ANKLE, TENOSYNOVECTOMY  POSTERIOR TIBIAL TENDON AND FLEXOR DIGITORUM LONGUS TENDON RIGHT, POSSIBLE  REPAIR OF POSTERIOR TIBIAL TENDON AND FLEXOR DIGITORUM LONGUS TENDON RIGHT, APPLICATION POSTERIOR  SPLINT RIGHT  --BLOCK--    Medications prior to admission:   Prior to Admission medications    Medication Sig Start Date End Date Taking? Authorizing Provider   ferrous sulfate (IRON 325) 325 (65 Fe) MG tablet Take 325 mg by mouth 2 times daily   Yes Historical Provider, MD   levothyroxine (SYNTHROID) 25 MCG tablet Take 25 mcg by mouth Daily   Yes Historical Provider, MD   rizatriptan (MAXALT) 10 MG tablet Take 10 mg by mouth once as needed for Migraine. May repeat in 2 hours if needed   Yes Historical Provider, MD   albuterol sulfate HFA (VENTOLIN HFA) 108 (90 Base) MCG/ACT inhaler Inhale 2 puffs into the lungs every 6 hours as needed for Wheezing    Historical Provider, MD   fluticasone (FLONASE) 50 MCG/ACT nasal spray 1 spray by Each Nostril route daily    Historical Provider, MD   rizatriptan (MAXALT-MLT) 10 MG disintegrating tablet Take 1 tablet by mouth once as needed for Migraine for up to 1 dose.  May repeat in 2 hours if needed 10/3/14 10/3/14  Angel Major MD       Current medications:    Current Facility-Administered Medications   Medication Dose Route Frequency Provider Last Rate Last Dose    lactated ringers infusion   Intravenous Continuous Marisa Mauro  mL/hr at 20 0758      sodium chloride flush 0.9 % injection 10 mL  10 mL Intravenous 2 times per day Marisa Mauro MD        sodium chloride flush 0.9 % injection 10 mL  10 mL Intravenous PRN Osmin Reveles MD Pb        famotidine (PEPCID) injection 20 mg  20 mg Intravenous Once Jeff Lowe MD        ceFAZolin (ANCEF) 2 g in sterile water 20 mL IV syringe  2 g Intravenous Once Maura Pollard DPM        midazolam (VERSED) 2 MG/2ML injection             bupivacaine (PF) (MARCAINE) 0.25 % injection             bupivacaine (PF) (MARCAINE) 0.5 % injection             EPINEPHrine PF 1 MG/ML injection                Allergies:  No Known Allergies    Problem List:    Patient Active Problem List   Diagnosis Code    Active labor [id number]    Normal vaginal delivery O80    Headache R51    Labor and delivery indication for care or intervention O75.9    Migraine G43.909    Hip impingement syndrome M25.859       Past Medical History:        Diagnosis Date    Anemia     iron supplement    Headache(784.0)     Herpes simplex without mention of complication     last outbreak 2 months ago      Hypothyroidism        Past Surgical History:        Procedure Laterality Date    CHOLECYSTECTOMY, LAPAROSCOPIC  2007    DILATION AND CURETTAGE OF UTERUS  2009    HERNIA REPAIR      KNEE SURGERY  2005    rt knee surgery       Social History:    Social History     Tobacco Use    Smoking status: Never Smoker    Smokeless tobacco: Never Used   Substance Use Topics    Alcohol use:  Yes     Alcohol/week: 8.3 standard drinks     Types: 10 Standard drinks or equivalent per week                                Counseling given: Not Answered      Vital Signs (Current):   Vitals:    09/18/20 1211 09/22/20 0729 09/22/20 0836   BP:  119/77 110/60   Pulse:  65 66   Resp:  16 16   Temp:  97.7 °F (36.5 °C)    TempSrc:  Temporal    SpO2:  100% (!) 65%   Weight: 140 lb (63.5 kg)     Height: 5' 4\" (1.626 m)                                                BP Readings from Last 3 Encounters:   09/22/20 110/60   11/19/18 127/83   04/13/18 134/71       NPO Status: Time of last liquid consumption: 0000 Time of last solid consumption: 0000                        Date of last liquid consumption: 09/22/20                        Date of last solid food consumption: 09/22/20    BMI:   Wt Readings from Last 3 Encounters:   09/18/20 140 lb (63.5 kg)   05/13/19 169 lb (76.7 kg)   11/19/18 164 lb (74.4 kg)     Body mass index is 24.03 kg/m². CBC:   Lab Results   Component Value Date    WBC 6.4 02/25/2020    RBC 4.80 02/25/2020    HGB 13.5 02/25/2020    HCT 41.1 02/25/2020    MCV 85.7 02/25/2020    RDW 18.2 02/25/2020     02/25/2020       CMP:   Lab Results   Component Value Date     10/01/2012    K 3.9 10/01/2012     10/01/2012    CO2 25 10/01/2012    BUN 7 10/01/2012    CREATININE 0.4 10/01/2012    GFRAA >60 10/01/2012    AGRATIO 1.5 10/01/2012    GLUCOSE 83 05/13/2019    PROT 6.1 10/01/2012    CALCIUM 8.9 10/01/2012    BILITOT 0.30 10/01/2012    ALKPHOS 86 10/01/2012    AST 15 10/01/2012    ALT 11 10/01/2012       POC Tests: No results for input(s): POCGLU, POCNA, POCK, POCCL, POCBUN, POCHEMO, POCHCT in the last 72 hours.     Coags: No results found for: PROTIME, INR, APTT    HCG (If Applicable):   Lab Results   Component Value Date    PREGTESTUR Negative 09/22/2020        ABGs: No results found for: PHART, PO2ART, AZG2CSF, HEQ1VGB, BEART, O8ZZNYVS     Type & Screen (If Applicable):  Lab Results   Component Value Date    LABABO O 02/12/2013    LABRH Negative 02/12/2013       Drug/Infectious Status (If Applicable):  No results found for: HIV, HEPCAB    COVID-19 Screening (If Applicable):   Lab Results   Component Value Date    COVID19 NOT DETECTED 09/16/2020         Anesthesia Evaluation   no history of anesthetic complications:   Airway: Mallampati: II  TM distance: >3 FB   Neck ROM: full  Mouth opening: > = 3 FB Dental: normal exam         Pulmonary:Negative Pulmonary ROS                              Cardiovascular:Negative CV ROS                      Neuro/Psych:   (+) headaches: migraine headaches,             GI/Hepatic/Renal: Neg GI/Hepatic/Renal ROS            Endo/Other:    (+) hypothyroidism::., .                 Abdominal:           Vascular: negative vascular ROS. Anesthesia Plan      general     ASA 2     (Pt agrees to risks, benefits and alternatives of GA for operative care as well as a femoral nerve block and sciatic nerve block for post operative analgesia. Questions answered. Willing to proceed.)  Induction: intravenous. Anesthetic plan and risks discussed with patient.                       Amanda Francois MD   9/22/2020

## 2020-09-22 NOTE — OP NOTE
Operative Note      Patient: Patricia Ovalles  YOB: 1975  MRN: [id number]    Date of Procedure: 9/22/2020    Pre-Op Diagnosis: POSTERIOR TIBIAL TENDON AND FLEXOR DIGITORUM LONGUS TENDON TENOSYNOVITIS, POSTERIOR TIBIAL TENDON DYSFUNCTION RIGHT    Post-Op Diagnosis: POSTERIOR TIBIAL TENDON TENOSYNOVITIS, POSTERIOR TIBIAL TENDON DYSFUNCTION RIGHT       Procedure(s):  MODIFIED BROSTROM PROCEDURE RIGHT ANKLE, TENOSYNOVECTOMY  POSTERIOR TIBIAL TENDON RIGHT, APPLICATION POSTERIOR  SPLINT RIGHT    Surgeon(s):  Maura Alves DPM    Assistant: Jb Valladares, PGY-3; Ricky Crump MS IV    Anesthesia: General with peripheral nerve block via anesthesia     Hemostasis: Pneumatic thigh tourniquet 325 mmHg for 91 minutes     Estimated Blood Loss (mL): less than 50      Materials: 3-0 Vicryl, 4-0 Vicryl, 4-0 Nylon     Injectables: Pre: None; Post: None     Complications: None    Specimens:   ID Type Source Tests Collected by Time Destination   A :  Tissue Tissue SURGICAL PATHOLOGY Jovitarosibel [facility] 9/22/2020 0930      Implants:  Implant Name Type Inv. Item Serial No.  Lot No. LRB No. Used Action   GRAFT SOFT TISS AMNION DEMINERALIZED BONE - [id number] Bone/Graft/Tissue/Human/Synth GRAFT SOFT TISS AMNION DEMINERALIZED BONE [id number] [employer]  Right 1 Implanted   IMPL LIGAMENT INT BRACE JUMPSTART Fastener IMPL LIGAMENT INT BRACE JUMPSTART  [address] [id number] Right 1 Implanted   [facility] LIGAMENT FIBERTAK DX SUTURE Fastener ANCHOR LIGAMENT FIBERTAK DX SUTURE  [address] [id number] Right 1 Implanted   ANCHOR LIGAMENT FIBERTAK DX SUTURE Fastener ANCHOR LIGAMENT FIBERTAK DX SUTURE  [address] [id number] Right 1 Implanted         Drains: * No LDAs found *    Findings: Hemorrhagic synovitis noted to the posterior tibial tendon;  No longitudinal split tears noted within posterior tibial tendon; Healthy white glistening tissue noted to the posterior tibial tendon; 2 cm avulsion fracture off distal fibula at the level of insertion of Anterior talofibular ligament    Indications for the procedure: The patient presents to the operating room today due to recalcitrant right foot/ankle pain. Outpatient radiographs noted an avulsion fracture off of the distal tip of the fibula at the level of the insertion of the anterior talofibular ligament. An outpatient MRI was obtained and noted tenosynovitis within the posterior tibial tendon and flexor digitorum longus tendons. As well as, remote injury of the anterior talofibular ligament with attenuation of the anterior talofibular ligament. Due to exhausting all conservative treatment options including but not limited to shoe gear modifications, orthotics/bracing, NSAIDs, decrease in activity, physical therapy and CAM boot immobilization it was determined at this time that the patient would benefit from surgical intervention. All potential risk, benefits, and complications were discussed with the patient prior to the scheduling of the procedure. All patient's questions were answered no guarantees were given. The patient wished to proceed with surgery and written informed consent was obtained. Detailed Description of Procedure: The patient was brought from the preoperative area into the operating room and placed on the operating room table in the supine position with care to pad all bony prominences. Following a period of sedation, a well-padded pneumatic thigh tourniquet was applied to the right lower extremity. Next, 2 g of Ancef was given via the anesthesiologist for preoperative prophylactic antibiosis. The patient had also received a preoperative popliteal/saphenous nerve block via anesthesia. Next, the right lower extremity was then scrubbed, prepped, and draped in the usual sterile fashion. A timeout was then performed. The patient, procedures, and operative site were identified and confirmed.   Next, utilizing Esmark the right lower extremity was exsanguinated and the pneumatic thigh tourniquet was rapidly inflated to 325 mmHg and the following procedures were performed. Procedure #1: Tenosynovectomy Posterior tibial tendon, Right foot/ankle: At this time, attention was directed towards the medial aspect of the right foot/ankle where utilizing skin marker and approximately 6 cm curvilinear incision along the course of the posterior tibial tendon was marked out starting approximately 2 cm proximal to the medial malleolus and carried distally to the navicular tuberosity. Next, utilizing a #15 blade the skin was incised down to the subcutaneous layer. All bleeders were identified electrocauterized as they are encountered. All vital neurovascular structures were carefully identified and retracted on the surgical field. Next, utilizing combination of sharp and blunt dissection the incision was then deepened down through the deep fascial layer. Next, utilizing a #15 blade and Metzenbaum scissors the posterior tibial tendon sheath was identified and was opened along the length the incision to allow for inspection of the posterior tibial tendon. Upon inspection of the posterior tibial tendon hemorrhagic synovitis was noted within the tendon sheath. No longitudinal split tears were noted within the posterior tibial tendon and healthy glistening white tissue was noted. Next, utilizing a pickups and Metzenbaum scissors the hemorrhagic synovitis was excised in total and passed from the operative field to the back table and sent as surgical pathology for gross microscopic examination. At this time, it was determined that the tendon of the flexor digitorum longus did not warrant surgical inspection as there was very early signs of tenosynovitis within the posterior tibial tendon with no underlying longitudinal split tears or tendinosis. Next, the wound was then irrigated with copious amounts of normal sterile saline.   At this time, it was determined joint capsule within the lateral shoulder of the talus and carried distally to the distal tip of the fibula was made with care to preserve a proximal cuff of tissue for suture repair. Next, utilizing a pickups and #15 blade the proximal cuff of the joint capsule and anterior talofibular ligament was carefully reflected off to allow for exposure for hardware placement. Upon inspection of the distal fibula an approximately 2 cm avulsion fracture at the level of the insertion of the anterior talofibular ligament was identified and utilizing a #15 blade and pickups was excised en total and passed from the operative field to the back table. Next, utilizing a rongeur the distal fibula was debrided down to healthy bleeding cancellus bone to aid in scarring of the ligament repair. Next, utilizing the recommended manufactures instructions and provided drill an Arthrex 1.3 mm fiber tack suture was placed approximately 10 mm proximal to the distal tip of the fibula. Next, an additional Arthrex 1.3 mm fiber tack suture was placed 20 mm proximal to the distal tip of the fibula utilizing the recommended manufactures instructions and provided drill. Both suture strands were then pulled on to deploy the knotless suture anchors within the respective fibular bone tunnels. Next, utilizing the provided talar offset guide for the Arthrex internal brace this was placed within the sinus tarsi approximately 2 cm from the lateral talar process angulated 45 degrees from the sagittal plane and parallel along the longitudinal line of the foot at the 4:30 position on the clock face. Next, utilizing the provided K wire this was then inserted over the white guide for the cannulated drill. Next, utilizing intraoperative live fluoroscopy placement of the guidewire was noted to be in excellent position. Next, utilizing a #15 blade a stab incision was made over the guidewire to allow for hardware placement.   Next, utilizing the 3.4 mm cannulated drill for the 4.75 mm swivel lock anchor this was drilled to a hard stop utilizing the talar offset guide. Next, utilizing the provided 4.75 mm green handle tap the bone tunnel was tapped down to the laser line. Next, utilizing the preloaded 4.75 mm swivel lock suture anchor was inserted into the talar hole through the offset drill guide and malleted into place. The suture anchor was then advanced until it was flush with the talus. Next, utilizing the provided guidewire for the 3.5 mm swivel lock suture anchor this was placed within the distal fibula approximately 15 mm from the distal tip. Next, utilizing the provided 3.4 mm cannulated drill and tissue sleeve the fibular bone tunnel was drilled to a hard stop. Next, utilizing the 3.5 mm black handle tap the bone tunnel was tapped down to the laser line. Prior to completion of the internal brace the right foot was then dorsiflexed and everted and utilizing the previously provided 1.3 mm suture tape and needles the Broström lateral ankle ligament repair was completed utilizing a pants over vest suture fashion. Next, utilizing 3.5 mm swivel lock suture anchor the internal brace was completed while the foot was held in neutral with approximately 10 to 15 degrees of plantarflexion into the previously drilled fibular bone tunnel until the suture anchor was countersunk approximately 2 mm into the fibular bone. Next, the right ankle was then stressed via the anterior drawer and talar tilt with improved stability noted following modified Broström lateral ankle ligament repair with internal brace. Next, the wound was then irrigated with copious amounts of normal sterile saline. Next, attention was then directed towards wound closure. Next, utilizing 3-0 Vicryl the previous stab incision over the talar guidewire within the sinus tarsi was reapproximated simple interrupted suture fashion.   Next, utilizing 3-0 Vicryl the extensor retinaculum was reefed up to the lateral ankle ligament repair in a continuous running locking suture fashion. Next, the final third of the Arthrex amniotic tissue graft was placed over the lateral ankle ligament repair to provide an anti-inflammatory effect and decrease fibrosis/adhesions during the postoperative period. Next, utilizing 4-0 Vicryl the subcutaneous layer was reapproximated in simple interrupted suture fashion. Next, the skin edges were then reapproximated utilizing 4-0 nylon in simple interrupted suture fashion. Next, the incision sites were then dressed with Arthrex jumpstart dressing, sterile 4 x 4's, Zakiya and 4 inch Ace bandage. At this time the pneumatic thigh tourniquet was rapidly deflated and prompt hyperemic response was noted to the digits of the right foot. Procedure #3: Application posterior splint, Right lower extremity: Next, copious amounts of cast padding was applied from plantar aspect of the foot up to the mid calf. Next, a stockinette was applied from the plantar aspect of the foot up to the mid calf. Next, utilizing a moistened padded splint material a posterior splint was fashioned from the plantar aspect of the foot up to the mid calf and secured in place utilizing Ace bandage to prevent postoperative edema. The right lower extremity was then dorsiflexed and everted to relieve tension on the surgical incision site repair and prevent any acquired equinus deformity contracture. End of procedure: The patient tolerated procedure and anesthesia well. The patient was transferred from the operating room to PACU with vital signs stable and vascular status intact to the right lower extremity. Following a period of postoperative monitoring, the patient will be discharged home with written oral wound care instructions per Dr. An Harris. The patient is to follow-up with Dr. An Harris within the next 5 to 7 days for her first postoperative visit. The patient is to call if any complications occur. The patient is to keep the dressing clean, dry, and intact. The patient is strict nonweightbearing to the right lower extremity. At discharge the patient will be dispensed Percocet for pain and Xarelto for DVT prophylaxis.     Dictated on behalf Dr. Soha Raymundo DPM    Electronically signed by Aravind Stovall DPM on 9/22/2020 at 11:02 AM

## 2020-09-22 NOTE — H&P
I have examined the patient and reviewed the original history and physical completed and find no relevant changes. The nature of the procedure, possible complications, alternative forms of therapy, post-op course, and post-op goals have been explained to patient/patient family. All questions have been answered. The consent has been signed. Patient's surgical site has been marked.      Timothy Milan DPM   9/22/2020 8:40 AM

## 2020-09-22 NOTE — ANESTHESIA PROCEDURE NOTES
Peripheral Block    Patient location during procedure: pre-op  Staffing  Anesthesiologist: Paola Milner MD  Performed: anesthesiologist   Preanesthetic Checklist  Completed: patient identified, site marked, surgical consent, pre-op evaluation, timeout performed, IV checked, risks and benefits discussed, monitors and equipment checked, anesthesia consent given, oxygen available and patient being monitored  Peripheral Block  Patient position: supine  Prep: ChloraPrep  Patient monitoring: cardiac monitor, continuous pulse ox, frequent blood pressure checks and IV access  Block type: Femoral  Laterality: right  Injection technique: single-shot  Procedures: ultrasound guided  Infiltration strength: 1 %  Dose: 3 mL  Adductor canal  Provider prep: mask and sterile gloves  Needle  Needle type: combined needle/nerve stimulator   Needle gauge: 21 G  Needle length: 10 cm  Needle localization: ultrasound guidance  Assessment  Injection assessment: negative aspiration for heme, no paresthesia on injection and local visualized surrounding nerve on ultrasound  Paresthesia pain: none  Slow fractionated injection: yes  Hemodynamics: stable  Additional Notes  Immediately prior to procedure a \"time out\" was called to verify the correct patient, allergies, laterality, procedure and equipment. Time out performed with  RN    Local Anesthetic: 0.25 %  Bupivacaine plus epi 1 to 200K  Amount: 15 ml  in 5 ml increments after negative aspiration each time.         Reason for block: post-op pain management and at surgeon's request

## 2020-10-29 ENCOUNTER — HOSPITAL ENCOUNTER (OUTPATIENT)
Dept: PHYSICAL THERAPY | Age: 45
Setting detail: THERAPIES SERIES
Discharge: HOME | End: 2020-10-29
Payer: COMMERCIAL

## 2020-10-29 PROCEDURE — 97161 PT EVAL LOW COMPLEX 20 MIN: CPT

## 2020-10-29 PROCEDURE — 97110 THERAPEUTIC EXERCISES: CPT

## 2020-10-29 NOTE — PROGRESS NOTES
Outpatient Physical Therapy  Phone: [phone] Fax: [phone]     To: Jm Molina DPM       From: Marguerite Grimaldo PT     Patient: Danie Alston        : 1975  Diagnosis:  M25.371, M25.5711, M65.871    Date: 10/29/2020  Treatment Diagnosis:  R ankle pain, gt deviations s/p modified Brostrom procedure    Physical Therapy Certification/Re-Certification Form  Dear Dr. Kit Jarvis,   The following patient has been evaluated for physical therapy services and for therapy to continue, Medicare requires monthly physician review of the treatment plan. Please review the attached evaluation and/or summary of the patient's plan of care, and verify that you agree therapy should continue by signing the attached document and sending it back to our office. Plan of Care/Treatment to date:  [x] Therapeutic Exercise   [] Modalities:  [] Therapeutic Activity     [] Ultrasound  [] Electrical Stimulation   [x] Gait Training      [] Cervical Traction [] Lumbar Traction  [x] Neuromuscular Re-education  [] Hot/Coldpack [] Iontophoresis    [x] Instruction in HEP      Other:  [] Manual Therapy       []                        [] Aquatic Therapy       []                      ? Frequency/Duration:  # Days per week: [] 1 day # Weeks: [] 1 week [] 5 weeks      [x] 2 days? [] 2 weeks [x] 6 weeks     [] 3 days   [] 3 weeks [] 7 weeks     [] 4 days   [] 4 weeks [] 8 weeks    Rehab Potential: [] Excellent [x] Good [] Fair  [] Poor       Electronically signed by:  Marguerite Grimaldo PT      If you have any questions or concerns, please don't hesitate to call.   Thank you for your referral.    Physician Signature:________________________________Date:__________________  By signing above, therapists plan is approved by physician

## 2020-10-29 NOTE — PROGRESS NOTES
Physical Therapy  Initial Assessment  Date: 10/29/2020  Patient Name: Ora Springer  MRN: [id number]  : 1975           Subjective   General  Chart Reviewed: Yes  Additional Pertinent Hx: hypothyroidism, R knee Sx, anemia  Response To Previous Treatment: Not applicable  Referring Practitioner: Sukhjinder Carranza DPM  Referral Date : 10/28/20  Diagnosis: M25.371 R ankle instability, M25.571 pain in R ankle and joints of R foot, M65.871 R ankle and foot synovitis & tenosynovitis  General Comment  Comments: PLOF:  very active, has a gym membership and is an avid walker. PT Visit Information  Onset Date: 20  PT Insurance Information: Medical [place]  Subjective  Subjective: Sx on  after 6-10 weeks in a boot. NWB x2 wks after Sx and used crutches and scooter. Boot again x4 weeks and then issued a brace last week. Uses her boot once her brace irritates her foot (usually in the evenings). No longer using AD. Gets 10/10 pain \"every now and then\" but isn't consistent and doesn't last long. Pain is typically in the evening after being on her feet. Has intermittent pain with amb, typically 6-7/10. Currently navigates stairs with a nonreciprocating gt. 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         Objective          PROM RLE (degrees)  R Ankle Dorsiflexion 0-20: 8  R Ankle Forefoot Inversion 0-40: 30  R Ankle Forefoot Eversion 0-20: 14  AROM RLE (degrees)  R Ankle Dorsiflexion 0-20: 6  R Ankle Plantar Flexion 0-45: 44  R Ankle Forefoot Inversion 0-40: 28  R Ankle Forefoot Eversion 0-20: 6  AROM LLE (degrees)  L Ankle Dorsiflexion 0-20: 12  L Ankle Plantar Flexion 0-45: 54  L Ankle Forefoot Inversion 0-40: 60  L Ankle Forefoot Eversion 0-20: 10  Joint Mobility  ROM RLE: gross hypermobility of R foot and ankle  ROM LLE: gross hypermobility of L foot and ankle    Strength RLE  R Hip Flexion: 5/5  R Hip Extension: 5/5  R Hip ABduction: 5/5  R Ankle Dorsiflexion: 4+/5  R Ankle Plantar flexion: 5/5  R Ankle Inversion: 5/5  R Ankle Eversion: 5/5  Strength LLE  L Hip Flexion: 5/5  L Hip Extension: 5/5  L Hip ABduction: 5/5  L Ankle Dorsiflexion: 5/5  L Ankle Plantar Flexion: 5/5  L Ankle Inversion: 5/5  L Ankle Eversion: 5/5  L Great Toe Extension: 5/5  L Great Toe Flexion: 5/5  L Toe Extension: 5/5  L Toe Flexion: 5/5  Strength Other  Other: PGM 4/5 B                    Ambulation  Ambulation?: Yes  Ambulation 1  Quality of Gait: WNL                          Assessment   Conditions Requiring Skilled Therapeutic Intervention  Body structures, Functions, Activity limitations: Decreased functional mobility ; Decreased ADL status; Decreased ROM; Decreased strength;Decreased high-level IADLs; Increased pain  Assessment: Pt presents with decreased R ankle AROM and limitations with stairs post-op. Has gross hypermobility. Anticpate full return to PLOF.   Prognosis: Good  Decision Making: Low Complexity  REQUIRES PT FOLLOW UP: Yes  Activity Tolerance  Activity Tolerance: Patient Tolerated treatment well         Plan   Plan  Times per week: 2x/wk  Plan weeks: 6  Current Treatment Recommendations: Strengthening, Neuromuscular Re-education, Home Exercise Program, ROM, Manual Therapy - Soft Tissue Mobilization, Balance Training, Functional Mobility Training, Stair training    G-Code   LEFS:  31/80        Goals  Short term goals  Time Frame for Short term goals: 3 wks  Short term goal 1: Pt will decrease pain by 50% in frequency or intensity  Short term goal 2: Pt will be ind and compliant with HEP  Short term goal 3: Pt will increase R ankle AROM to equivalency of L  Long term goals  Time Frame for Long term goals : 6 wks  Long term goal 1: Pt will decrease pain by 90% in frequency or intensity  Long term goal 2: Pt will amb stairs with reciprocating gt  Long term goal 3: Pt will return to walking program  Long term goal 4: Pt will increase PGM strength to 4+/5  Patient Goals   Patient goals : walk without brace, increase mobility           Martha Philip, PT

## 2020-10-29 NOTE — FLOWSHEET NOTE
Physical Therapy Daily Treatment Note  Date:  10/29/2020    Patient Name:  Gerson Watson    :  1975  MRN: [id number]  Restrictions/Precautions:    Medical/Treatment Diagnosis Information:  · Diagnosis: M25.371 R ankle instability, M25.571 pain in R ankle and joints of R foot, M65.871 R ankle and foot synovitis & tenosynovitis     Insurance/Certification information:  PT Insurance Information: Medical [place]  Physician Information:  Referring Practitioner: Nata Ryan DPM  Plan of care signed (Y/N):    Visit# / total visits:    Pain level: 0-10/10     G-Code (if applicable):      Date / Visit # G-Code Applied:  10/29/2020   LEFS:       Progress Note: []  Yes  [x]  No  Next due by: Visit #10      Subjective:  See eval    Objective:      Exercises:  Exercise/Equipment Resistance/Repetitions Other comments   TG x5'    Incline gastroc stretch 3x30\"    FSU 4\" nv    Standing balance 1/2 roll nv  Tandem stance nv    Ankle alphabet seated on SB nv                                                    Other Therapeutic Activities:  eval completed, HEP issued and practiced    Home Exercise Program:  Ankle DF/PF/eversion with green TB, ankle alphabet, bridges, clamshells, gastroc stretch    Manual Treatments:  Gr. III TC, ST jt mobs nv    Modalities:      Timed Code Treatment Minutes:  15    Total Treatment Minutes:  47    Treatment/Activity Tolerance:  [x] Patient tolerated treatment well [] Patient limited by fatigue  [] Patient limited by pain  [] Patient limited by other medical complications  [] Other:     Prognosis: [x] Good [] Fair  [] Poor    Patient Requires Follow-up: [x] Yes  [] No    Plan:   [] Continue per plan of care [] Alter current plan (see comments)  [x] Plan of care initiated [] Hold pending MD visit [] Discharge    Plan for Next Session:   See above    Electronically signed by:  Amarilis Britt

## 2020-11-04 ENCOUNTER — HOSPITAL ENCOUNTER (OUTPATIENT)
Dept: PHYSICAL THERAPY | Age: 45
Setting detail: THERAPIES SERIES
Discharge: HOME | End: 2020-11-04
Payer: COMMERCIAL

## 2020-11-04 PROCEDURE — 97110 THERAPEUTIC EXERCISES: CPT

## 2020-11-04 PROCEDURE — 97140 MANUAL THERAPY 1/> REGIONS: CPT

## 2020-11-04 NOTE — FLOWSHEET NOTE
Physical Therapy Daily Treatment Note  Date:  2020    Patient Name:  Mercedes Genao    :  1975  MRN: [id number]  Restrictions/Precautions:    Medical/Treatment Diagnosis Information:  · Diagnosis: M25.371 R ankle instability, M25.571 pain in R ankle and joints of R foot, M65.871 R ankle and foot synovitis & tenosynovitis     Insurance/Certification information:  PT Insurance Information: Medical [place]  Physician Information:  Referring Practitioner: Timothy Milan DPM  Plan of care signed (Y/N):  yes  Visit# / total visits:    Pain level: 0-10/10     G-Code (if applicable):      Date / Visit # G-Code Applied:  10/29/2020   LEFS:       Progress Note: []  Yes  [x]  No  Next due by: Visit #10      Time in: 11:51 Timed Code Treatment Minutes:  35  Total Treatment Minutes:  40  Time out:  12:31      Subjective:  Having episodes of intense pain much less often. Does state that her ankle was very achy over the weekend after being on her feet a lot.     Objective:  AROM:  DF 11, eversion 14, PF 40    Exercises:  Exercise/Equipment Resistance/Repetitions Other comments   TG x5'    Incline gastroc stretch 3x30\"    FSU  LSU 4\" 2x15  4\" 1x10 6\" nv   Standing balance 1/2 roll x1'  Tandem stance 2x30\" B white   Ankle alphabet seated on SB x1 B    Tandem gt x40 ft    Mini squats 2x10    R stance with R TTWB on 8# ball nv                         S. Bike seat 1 (2 nv) L3x5'           Other Therapeutic Activities:      Home Exercise Program:  Ankle DF/PF/eversion with green TB, ankle alphabet, bridges, clamshells, gastroc stretch    Manual Treatments:  Gr. III TC, ST jt mobs x8'    Modalities:        Treatment/Activity Tolerance:  [x] Patient tolerated treatment well [] Patient limited by fatigue  [] Patient limited by pain  [] Patient limited by other medical complications  [] Other:     Prognosis: [x] Good [] Fair  [] Poor    Patient Requires Follow-up: [x] Yes  [] No    Goals  Short term goals  Time

## 2020-11-06 ENCOUNTER — HOSPITAL ENCOUNTER (OUTPATIENT)
Dept: PHYSICAL THERAPY | Age: 45
Setting detail: THERAPIES SERIES
Discharge: HOME | End: 2020-11-06
Payer: COMMERCIAL

## 2020-11-06 NOTE — PROGRESS NOTES
Physical Therapy  Cancellation/No-show Note  Patient Name:  Ora Springer  :  1975   Date:  2020  Cancels to date: 1  No-shows to date: 0    For today's appointment patient:  [x] Cancelled  [] Rescheduled appointment  [] No-show     Reason given by patient:  [] Patient ill  [] Conflicting appointment  [] No transportation    [] Conflict with work  [x] No reason given  [] Other:     Comments:      Electronically signed by:  Judy Tilley PT

## 2020-11-11 ENCOUNTER — APPOINTMENT (OUTPATIENT)
Dept: PHYSICAL THERAPY | Age: 45
End: 2020-11-11
Payer: COMMERCIAL

## 2020-11-13 ENCOUNTER — APPOINTMENT (OUTPATIENT)
Dept: PHYSICAL THERAPY | Age: 45
End: 2020-11-13
Payer: COMMERCIAL

## 2020-11-18 ENCOUNTER — HOSPITAL ENCOUNTER (OUTPATIENT)
Dept: PHYSICAL THERAPY | Age: 45
Setting detail: THERAPIES SERIES
Discharge: HOME | End: 2020-11-18
Payer: COMMERCIAL

## 2020-11-18 PROCEDURE — 97110 THERAPEUTIC EXERCISES: CPT

## 2020-11-18 PROCEDURE — 97140 MANUAL THERAPY 1/> REGIONS: CPT

## 2020-11-18 NOTE — FLOWSHEET NOTE
Physical Therapy Daily Treatment Note  Date:  2020    Patient Name:  Ute Bond    :  1975  MRN: [id number]  Restrictions/Precautions:    Medical/Treatment Diagnosis Information:  · Diagnosis: M25.371 R ankle instability, M25.571 pain in R ankle and joints of R foot, M65.871 R ankle and foot synovitis & tenosynovitis     Insurance/Certification information:  PT Insurance Information: Medical [place]  Physician Information:  Referring Practitioner: Tiffany Tello DPM  Plan of care signed (Y/N):  yes  Visit# / total visits:  3/12  Pain level: 0/10      G-Code (if applicable):      Date / Visit # G-Code Applied:  10/29/2020   LEFS:       Progress Note: []  Yes  [x]  No  Next due by: Visit #10      Time in: 10:34 Timed Code Treatment Minutes: 39  Total Treatment Minutes:  44  Time out:  11:18      Subjective:  States she's no longer getting sharp pains, but has a constant ache by mid afternoon. Sees Dr. Lexy Boston today. Objective:    Exercises:  Exercise/Equipment Resistance/Repetitions Other comments   TG x5'    Incline gastroc stretch 3x30\"    FSU  LSU 4\" 2x15  4\" 1x10 6\" nv   Standing balance 1/2 roll x1'    Rocker board A/P static and tapping x1' each White. No LOB.   Defer nv     Ankle alphabet seated on SB x1 B    Tandem gt x60 ft    Mini squats 2x10    R stance with R TTWB on 8# ball 2x30\" Had some discomfort along medial side of foot                        S. Bike seat 2  L3x5'           Other Therapeutic Activities:      Home Exercise Program:  Ankle DF/PF/eversion with green TB, ankle alphabet, bridges, clamshells, gastroc stretch    Manual Treatments:  Gr. III TC, ST jt mobs x16'    Modalities:        Treatment/Activity Tolerance:  [x] Patient tolerated treatment well [] Patient limited by fatigue  [] Patient limited by pain  [] Patient limited by other medical complications  [] Other:     Prognosis: [x] Good [] Fair  [] Poor    Patient Requires Follow-up: [x] Yes  [] No    Goals  Short term goals  Time Frame for Short term goals: 3 wks  Short term goal 1: Pt will decrease pain by 50% in frequency or intensity  Short term goal 2: Pt will be ind and compliant with HEP  Short term goal 3: Pt will increase R ankle AROM to equivalency of L  Long term goals  Time Frame for Long term goals : 6 wks  Long term goal 1: Pt will decrease pain by 90% in frequency or intensity  Long term goal 2: Pt will amb stairs with reciprocating gt  Long term goal 3: Pt will return to walking program  Long term goal 4: Pt will increase PGM strength to 4+/5    Plan:   [x] Continue per plan of care [] Alter current plan (see comments)  [] Plan of care initiated [] Hold pending MD visit [] Discharge    Plan for Next Session:   See above    Electronically signed by:  Elvera Gitelman

## 2020-11-20 ENCOUNTER — HOSPITAL ENCOUNTER (OUTPATIENT)
Dept: PHYSICAL THERAPY | Age: 45
Setting detail: THERAPIES SERIES
Discharge: HOME | End: 2020-11-20
Payer: COMMERCIAL

## 2020-11-20 PROCEDURE — 97140 MANUAL THERAPY 1/> REGIONS: CPT

## 2020-11-20 PROCEDURE — 97110 THERAPEUTIC EXERCISES: CPT

## 2020-11-25 ENCOUNTER — HOSPITAL ENCOUNTER (OUTPATIENT)
Dept: PHYSICAL THERAPY | Age: 45
Setting detail: THERAPIES SERIES
Discharge: HOME | End: 2020-11-25
Payer: COMMERCIAL

## 2020-11-25 PROCEDURE — 97110 THERAPEUTIC EXERCISES: CPT

## 2020-11-25 PROCEDURE — 97140 MANUAL THERAPY 1/> REGIONS: CPT

## 2020-11-25 NOTE — FLOWSHEET NOTE
Physical Therapy Daily Treatment Note  Date:  2020    Patient Name:  Mark Ruth    :  1975  MRN: [id number]  Restrictions/Precautions:    Medical/Treatment Diagnosis Information:  · Diagnosis: M25.371 R ankle instability, M25.571 pain in R ankle and joints of R foot, M65.871 R ankle and foot synovitis & tenosynovitis     Insurance/Certification information:  PT Insurance Information: Medical [place]  Physician Information:  Referring Practitioner: Nely Hancock DPM  Plan of care signed (Y/N):  yes  Visit# / total visits:    Pain level: 0/10      G-Code (if applicable):      Date / Visit # G-Code Applied:  10/29/2020   LEFS:       Progress Note: []  Yes  [x]  No  Next due by: Visit #10      Time in: 9:46 Timed Code Treatment Minutes: 43  Total Treatment Minutes:  43  Time out:  10:28      Subjective:  C/o increased pain on Sun and Mon but then yesterday she didn't need her brace. Sees  again on . Objective:  AROM:  DF 10, eversion 10, PF 40  Exercises:   Exercise/Equipment Resistance/Repetitions Other comments   TG x5'    Incline gastroc stretch 3x30\"    FSU  LSU 6\" 2x12  6\" 1x10    Standing balance 1/2 roll x1'    Rocker board A/P static and tapping x1' each White. No LOB.       With brace for A/P   Ankle alphabet seated on SB x1 B    Tandem gt x60 ft     Mini squats 2x10    R stance with L TTWB on 8# ball 2x30\" with brace                         S. Bike seat 2  L3x5'           Other Therapeutic Activities:      Home Exercise Program:  Ankle DF/PF/eversion with green TB, ankle alphabet, bridges, clamshells, gastroc stretch    Manual Treatments:  Gr. III TC, ST jt mobs x10'    Modalities:        Treatment/Activity Tolerance:  [x] Patient tolerated treatment well [] Patient limited by fatigue  [] Patient limited by pain  [] Patient limited by other medical complications  [] Other:     Prognosis: [x] Good [] Fair  [] Poor    Patient Requires Follow-up: [x] Yes  [] No    Goals  Short term goals  Time Frame for Short term goals: 3 wks  Short term goal 1: Pt will decrease pain by 50% in frequency or intensity  Short term goal 2: Pt will be ind and compliant with HEP (met)  Short term goal 3: Pt will increase R ankle AROM to equivalency of L (improving)  Long term goals  Time Frame for Long term goals : 6 wks  Long term goal 1: Pt will decrease pain by 90% in frequency or intensity  Long term goal 2: Pt will amb stairs with reciprocating gt (met)  Long term goal 3: Pt will return to walking program  Long term goal 4: Pt will increase PGM strength to 4+/5    Plan:   [x] Continue per plan of care [] Alter current plan (see comments)  [] Plan of care initiated [] Hold pending MD visit [] Discharge    Plan for Next Session:   See above    Electronically signed by:  Burnice Holter

## 2020-11-30 ENCOUNTER — HOSPITAL ENCOUNTER (OUTPATIENT)
Dept: PHYSICAL THERAPY | Age: 45
Setting detail: THERAPIES SERIES
Discharge: HOME | End: 2020-11-30
Payer: COMMERCIAL

## 2020-11-30 NOTE — PROGRESS NOTES
Physical Therapy  Cancellation/No-show Note  Patient Name:  Shanta Oakes  :  1975   Date:  2020  Cancels to date: 2  No-shows to date: 0    For today's appointment patient:  [x] Cancelled  [] Rescheduled appointment  [] No-show     Reason given by patient:  [] Patient ill  [] Conflicting appointment  [] No transportation    [] Conflict with work  [] No reason given  [x] Other:     Comments:  No childcare    Electronically signed by:  Nay Andrade PT

## 2020-12-03 ENCOUNTER — HOSPITAL ENCOUNTER (OUTPATIENT)
Dept: PHYSICAL THERAPY | Age: 45
Setting detail: THERAPIES SERIES
Discharge: HOME | End: 2020-12-03
Payer: COMMERCIAL

## 2020-12-03 PROCEDURE — 97110 THERAPEUTIC EXERCISES: CPT

## 2020-12-03 PROCEDURE — 97140 MANUAL THERAPY 1/> REGIONS: CPT

## 2020-12-03 NOTE — FLOWSHEET NOTE
Physical Therapy Daily Treatment Note  Date:  12/3/2020    Patient Name:  Anna Reyes    :  1975  MRN: [id number]  Restrictions/Precautions:    Medical/Treatment Diagnosis Information:  · Diagnosis: M25.371 R ankle instability, M25.571 pain in R ankle and joints of R foot, M65.871 R ankle and foot synovitis & tenosynovitis     Insurance/Certification information:  PT Insurance Information: Medical [place]  Physician Information:  Referring Practitioner: Costa Helms DPM  Plan of care signed (Y/N):  yes  Visit# / total visits:    Pain level: 0/10      G-Code (if applicable):      Date / Visit # G-Code Applied:  10/29/2020   LEFS:       Progress Note: []  Yes  [x]  No  Next due by: Visit #10      Time in: 3:30 Timed Code Treatment Minutes: 45  Total Treatment Minutes:  45  Time out:  4:15      Subjective:  Pt reports that she has had more pain with standing over the past week to week and a half. Able to make it about 2-3 hours prior to pain increasing. Wore her brace today as yesterday it was swollen and slightly bruised.      Objective:  AROM:  DF 15 (with click at 10 deg), eversion 15, PF 40  Exercises:   Exercise/Equipment Resistance/Repetitions Other comments   TG x5'    Incline gastroc stretch 3x30\"    FSU  LSU     Standing balance Rocker board A/P static and tapping x1' each Without brace   Ankle alphabet seated on SB x1 B    Tandem gt      Mini squats     R stance with L TTWB on 8# ball 2x30\"  Without brace   Tandem stance on airex X60\" B Without brace                   S. Bike seat 2             Other Therapeutic Activities:      Home Exercise Program:  Ankle DF/PF/eversion with green TB, ankle alphabet, bridges, clamshells, gastroc stretch    Manual Treatments:    Gr III joint mobs to SCJ, TCJ, midfoot  MFR to R gastrosoleus with tool  MWM into DF in closed chain position on TG    Total manual 20 minutes     Modalities:  n/a    Treatment/Activity Tolerance:  [x] Patient tolerated treatment well [] Patient limited by fatigue  [] Patient limited by pain  [] Patient limited by other medical complications  [] Other:     Prognosis: [x] Good [] Fair  [] Poor    Patient Requires Follow-up: [x] Yes  [] No    Goals  Short term goals  Time Frame for Short term goals: 3 wks  Short term goal 1: Pt will decrease pain by 50% in frequency or intensity  Short term goal 2: Pt will be ind and compliant with HEP (met)  Short term goal 3: Pt will increase R ankle AROM to equivalency of L (improving)  Long term goals  Time Frame for Long term goals : 6 wks  Long term goal 1: Pt will decrease pain by 90% in frequency or intensity  Long term goal 2: Pt will amb stairs with reciprocating gt (met)  Long term goal 3: Pt will return to walking program  Long term goal 4: Pt will increase PGM strength to 4+/5    Plan:   [x] Continue per plan of care [] Alter current plan (see comments)  [] Plan of care initiated [] Hold pending MD visit [] Discharge    Plan for Next Session:   See above    Electronically signed by:  Delia Wallace

## 2020-12-10 ENCOUNTER — HOSPITAL ENCOUNTER (OUTPATIENT)
Dept: PHYSICAL THERAPY | Age: 45
Setting detail: THERAPIES SERIES
Discharge: HOME | End: 2020-12-10
Payer: COMMERCIAL

## 2020-12-10 PROCEDURE — 97110 THERAPEUTIC EXERCISES: CPT

## 2020-12-10 PROCEDURE — 97112 NEUROMUSCULAR REEDUCATION: CPT

## 2020-12-10 PROCEDURE — 97140 MANUAL THERAPY 1/> REGIONS: CPT

## 2020-12-10 NOTE — FLOWSHEET NOTE
Physical Therapy Daily Treatment Note  Date:  12/10/2020    Patient Name:  Guille Real    :  1975  MRN: [id number]  Restrictions/Precautions:    Medical/Treatment Diagnosis Information:  · Diagnosis: M25.371 R ankle instability, M25.571 pain in R ankle and joints of R foot, M65.871 R ankle and foot synovitis & tenosynovitis     Insurance/Certification information:  PT Insurance Information: Medical [place]  Physician Information:  Referring Practitioner: Joaquim Burch DPM  Plan of care signed (Y/N):  yes  Visit# / total visits:    Pain level: 0/10      G-Code (if applicable):      Date / Visit # G-Code Applied:  10/29/2020   LEFS:       Progress Note: []  Yes  [x]  No  Next due by: Visit #10      Time in: 12:50 Timed Code Treatment Minutes: 55  Total Treatment Minutes:  55  Time out:  1:45      Subjective:  Pt reports that she saw the MD again this week and they confirm that everything is looking good. Pt concerned about going back to work next week.      Objective:  AROM:  DF 15 (with minor click at 10 deg), eversion 15, PF 40  Exercises:   Exercise/Equipment Resistance/Repetitions Other comments   TG x5'  With DL HR and SL eccentric lowering x1 min  With DL HR x1 min End of session   Short foot in sitting 10x5\" B HEP   Short foot in sitting with HR 20x B HEP   Short foot in standing with hip extension 20x B HEP        Incline gastroc stretch 3x30\"    FSU 10x 6\" step    Standing balance Rocker board A/P static and tapping x1' each Without brace   Ankle alphabet   HEP   Tandem on airex with short foot X60\" B    Mini squats     R stance with L TTWB on 8# ball 2x30\"  Without brace   multihip machine  15# x10 ABD B                                Other Therapeutic Activities:      Home Exercise Program:  Ankle DF/PF/eversion with green TB, ankle alphabet, bridges, clamshells, gastroc stretch    Manual Treatments:    Gr III joint mobs to SCJ, TCJ, midfoot  MFR to R gastrosoleus with tool  MWM into DF in closed chain position on TG    Total manual 15 minutes     Modalities:  n/a    Treatment/Activity Tolerance:  [x] Patient tolerated treatment well [] Patient limited by fatigue  [] Patient limited by pain  [] Patient limited by other medical complications  [] Other:     Prognosis: [x] Good [] Fair  [] Poor    Patient Requires Follow-up: [x] Yes  [] No    Goals  Short term goals  Time Frame for Short term goals: 3 wks  Short term goal 1: Pt will decrease pain by 50% in frequency or intensity  Short term goal 2: Pt will be ind and compliant with HEP (met)  Short term goal 3: Pt will increase R ankle AROM to equivalency of L (improving)  Long term goals  Time Frame for Long term goals : 6 wks  Long term goal 1: Pt will decrease pain by 90% in frequency or intensity  Long term goal 2: Pt will amb stairs with reciprocating gt (met)  Long term goal 3: Pt will return to walking program  Long term goal 4: Pt will increase PGM strength to 4+/5    Plan:   [x] Continue per plan of care [] Alter current plan (see comments)  [] Plan of care initiated [] Hold pending MD visit [] Discharge    Plan for Next Session:   See above    Electronically signed by:  Lennox Kelly

## 2020-12-21 ENCOUNTER — HOSPITAL ENCOUNTER (OUTPATIENT)
Dept: PHYSICAL THERAPY | Age: 45
Setting detail: THERAPIES SERIES
Discharge: HOME | End: 2020-12-21
Payer: COMMERCIAL

## 2020-12-21 PROCEDURE — 97110 THERAPEUTIC EXERCISES: CPT

## 2020-12-21 PROCEDURE — 97140 MANUAL THERAPY 1/> REGIONS: CPT

## 2020-12-21 NOTE — FLOWSHEET NOTE
Physical Therapy Daily Treatment Note  Date:  2020    Patient Name:  Susan Garcia    :  1975  MRN: [id number]  Restrictions/Precautions:    Medical/Treatment Diagnosis Information:  · Diagnosis: M25.371 R ankle instability, M25.571 pain in R ankle and joints of R foot, M65.871 R ankle and foot synovitis & tenosynovitis     Insurance/Certification information:  PT Insurance Information: Medical [place]  Physician Information:  Referring Practitioner: Madhu Ortiz DPM  Plan of care signed (Y/N):  yes  Visit# / total visits:    Pain level: 0/10      G-Code (if applicable):      Date / Visit # G-Code Applied:  10/29/2020   LEFS:       Progress Note: []  Yes  [x]  No  Next due by: Visit #10      Time in: 2:00  Timed Code Treatment Minutes: 45  Total Treatment Minutes:  45  Time out:  2:45      Subjective:  Pt reports that she worked two full days since last visit, no significant swelling, but was sore after the 2nd day. Wore her brace the entire two days. Exercises going well. Most of her soreness now is in the arch of her foot. Also working on getting orthotics.       Objective:  AROM:  DF 15 (with minor click at 10 deg), eversion 15, PF 40  Exercises:   Exercise/Equipment Resistance/Repetitions Other comments   TG x5'  With DL HR and SL eccentric lowering x1 min  With DL HR x1 min End of session   Short foot in sitting 10x5\" B HEP   Short foot in sitting with HR 20x B HEP   Short foot in standing with hip extension   HEP        Incline gastroc stretch 3x30\"    FSU 10x 6\" step    Standing balance Rocker board A/P static and tapping x1' each Without brace   Ankle alphabet   HEP   Tandem on airex with short foot X60\" B    Mini squats     R stance with L TTWB on 8# ball 2x30\"  Without brace   multihip machine  15# x10 ABD B    Sport cord walk out 5x each direction                           Other Therapeutic Activities: Home Exercise Program:  Ankle DF/PF/eversion with green TB, ankle alphabet, bridges, clamshells, gastroc stretch    Manual Treatments:    Gr III joint mobs to SCJ, TCJ, midfoot  MFR to R gastrosoleus with tool  MWM into DF in closed chain position on TG    Total manual 15 minutes     Modalities:  n/a    Treatment/Activity Tolerance:  [x] Patient tolerated treatment well [] Patient limited by fatigue  [] Patient limited by pain  [] Patient limited by other medical complications  [] Other:     Prognosis: [x] Good [] Fair  [] Poor    Patient Requires Follow-up: [x] Yes  [] No    Goals  Short term goals  Time Frame for Short term goals: 3 wks  Short term goal 1: Pt will decrease pain by 50% in frequency or intensity  Short term goal 2: Pt will be ind and compliant with HEP (met)  Short term goal 3: Pt will increase R ankle AROM to equivalency of L (improving)  Long term goals  Time Frame for Long term goals : 6 wks  Long term goal 1: Pt will decrease pain by 90% in frequency or intensity  Long term goal 2: Pt will amb stairs with reciprocating gt (met)  Long term goal 3: Pt will return to walking program  Long term goal 4: Pt will increase PGM strength to 4+/5    Plan:   [x] Continue per plan of care [] Alter current plan (see comments)  [] Plan of care initiated [] Hold pending MD visit [] Discharge    Plan for Next Session:   See above    Electronically signed by:  Jose De Jesus Jones

## 2020-12-28 ENCOUNTER — HOSPITAL ENCOUNTER (OUTPATIENT)
Dept: PHYSICAL THERAPY | Age: 45
Setting detail: THERAPIES SERIES
Discharge: HOME | End: 2020-12-28
Payer: COMMERCIAL

## 2020-12-31 ENCOUNTER — APPOINTMENT (OUTPATIENT)
Dept: PHYSICAL THERAPY | Age: 45
End: 2020-12-31
Payer: COMMERCIAL

## 2021-01-05 ENCOUNTER — HOSPITAL ENCOUNTER (OUTPATIENT)
Dept: PHYSICAL THERAPY | Age: 46
Setting detail: THERAPIES SERIES
Discharge: HOME | End: 2021-01-05
Payer: COMMERCIAL

## 2021-01-05 NOTE — PROGRESS NOTES
Physical Therapy  Cancellation/No-show Note  Patient Name:  Shalom Lucas  :  1975   Date:  2021  Cancels to date: 3  No-shows to date: 1    For today's appointment patient:  [x] Cancelled  [] Rescheduled appointment  [] No-show     Reason given by patient:  [] Patient ill  [] Conflicting appointment  [] No transportation    [] Conflict with work  [] No reason given  [x] Other:     Comments:  Headache     Electronically signed by:  Yary Patel PT

## 2021-01-18 ENCOUNTER — HOSPITAL ENCOUNTER (OUTPATIENT)
Dept: PHYSICAL THERAPY | Age: 46
Setting detail: THERAPIES SERIES
Discharge: HOME | End: 2021-01-18
Payer: COMMERCIAL

## 2021-04-22 ENCOUNTER — HOSPITAL ENCOUNTER (OUTPATIENT)
Dept: PHYSICAL THERAPY | Age: 46
Setting detail: THERAPIES SERIES
Discharge: HOME | End: 2021-04-22
Payer: COMMERCIAL

## 2021-04-22 PROCEDURE — 97161 PT EVAL LOW COMPLEX 20 MIN: CPT

## 2021-04-22 PROCEDURE — 97112 NEUROMUSCULAR REEDUCATION: CPT

## 2021-04-22 NOTE — PROGRESS NOTES
[x]English       []other:  C-SSRS Triggered by Intake questionnaire (Past 2 wk assessment):   [x] No, Questionnaire did not trigger screening.   [] Yes, Patient intake triggered further evaluation      [] C-SSRS Screening completed  [] PCP notified via Plan of Care  [] Emergency services notified    Preferred Language for HealthCare:   [x]English   []Other:         SUBJECTIVE FINDINGS        History of Present Illness:         Patient reports symptoms began: pt states symptoms began 4/17/21 while she was out to dinner. Pt had 1 beer and thought she felt extra woozy. Pt states she had some vertigo with rolling in bed that night and over the next few days vertigo with any movement. Pt has not seen her PCP, pt had to call off work Tuesday. Pt works here at [facility] and states she mentioned it to one of the inpt PTs and they suggested coming to therapy. Pt states she has had vertigo in the past before, a few years ago, states MD just ordered blood work, found out pt was anemic and put on iron. Pt experiences symptoms of vertigo? YES  Describe:     room spinning, loss of balance, uneasiness, lightheadedness, dizziness, nausea and anxiety    How long do these symptoms last?      seconds    How often do spells occur?      daily    Vertigo is:     induced by motion and induced by position changes     Pt experiences disequilibrium? YES    Symptoms worse with:    movement of visual environment, self motion, complex visual environments, visual patterns and visual tasks (reading) lying down, rolling L maybe > R, bending over    Associated hearing/ ear symptoms:     NO    Describe:   NA    Any recent illness or infections? No    Any recent accidents or head trauma? No    Any history of migraines or headaches?       Yes PCP manages, has medication for when she gets HA, gets 2x/month      History of Prior Therapy/Testing (e.g. MRI):  NA    Review of Medications:    Pt h/o or currently taking any vestibular suppressants? Yes  Describe: meclizine, pt took on Monday    Pt aware of any medications that may cause dizziness? No    History of Falls or near Falls:    Any falls to the ground? NO  How many in last 6 months? 0    Does pt complain of stumble, stagger, or side step while walking? YES  Does pt complain of drift to one side while walking?        no    Limitations (hearing/vision loss/other):    Does pt wear glasses/contacts? yes:  bifocals    Does pt have chronic hearing loss?     no    Does pt wear hearing aids?    no    Current Functional Limitations:   YES  Functional complaints:  Limiting activity, was in bed all day Tuesday, called off work      Pain:     YES  Location: cervical pain for past 6 months       4/10 pain at present   6/10 pain at its worst.         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 Sport/recreational activities: work out 4-5x/week    Patient goal for therapy:  \"to not be dizzy \"        OBJECTIVE FINDINGS      Blood Pressure (if hx of HTN or possible orthostatic HTN):  tested  Supine:   Seated: 124/75  Standin/82    Cervical AROM:    WFL  Description: NA    Vertebral Artery test in sitting position:     Negative    Oculomotor Examination:    Room Light:   Spontaneous Nystagmus:  NO   NA   Gaze Holding Nystagmus:  YES   horizontal and left beating   Eye Movement Range:  conjugate  Vergence: WNL      Smooth Pursuits:     WNL   Saccades:       WNL (2 or less)   VOR (Cancellation): WNL  VOR (Slow):       WNL  Head Impulse Test/ Head Thrust Test:     Positive to the LEFT  DVA    tested  Static VA (Snellen chart, 10 feet, lowest line read with no missed optotypes):line 9  DVA (head in motion @ 2 Hz, Snellen chart, 10 feet, lowest line read with no missed optotypes):line 6  DVA is     Abnormal (3 or more line difference)       Positional Testing:   tested  Right Facundo Hallpike:    negative  Left Hurdland Hallpike:      negative pt reports some vertigo, felt like it was going to spin, PT thought possibly 2 beats of nystagmus but feels this is a negative test for BPPV today, pt presents more with + findings of hypofunction. Right Roll test:      negative  Left Roll test:      negative  Head Center Test:     negative     Balance Testing:     not tested    Gait Testing:   tested  Level of Assistance needed:  Independent  Gait Deviations (firm surface/linoleum):    decreased minerva and decreased head and trunk rotation  Assistive Device Used:  No AD    Stairs:   Level of Assistance needed:      Not Tested  Pt able to negotiate up/down 4 stairs:  N/A  Assistive Device Used:      N/A      Bandages/Dressings/Incisions: [x]None                          [x] Patient history, allergies, meds reviewed. Medical chart reviewed. See intake form. Review Of Systems (ROS):  [x]Performed Review of systems (Integumentary, CardioPulmonary, Neurological) by intake and observation. Intake form has been scanned into medical record. Patient has been instructed to contact their primary care physician regarding ROS issues if not already being addressed at this time.         Co-morbidities/Complexities (which will affect course of rehabilitation):   [x]None           Arthritic conditions   []Rheumatoid arthritis (M05.9)  []Osteoarthritis (M19.91)   Cardiovascular conditions   []Hypertension (I10)  []Hyperlipidemia (E78.5)  []Angina pectoris (I20)  []Atherosclerosis (I70)   Musculoskeletal conditions   []Disc pathology   []Congenital spine pathologies   []Prior surgical intervention  []Osteoporosis (M81.8)  []Osteopenia (M85.8)   Endocrine conditions   []Hypothyroid (E03.9)  []Hyperthyroid Gastrointestinal conditions   []Constipation (Q26.83)   Metabolic conditions   []Morbid obesity (E66.01)  []Diabetes type 1(E10.65) or 2 (E11.65)   []Neuropathy (G60.9)     Pulmonary conditions   []Asthma (J45)  []Coughing   []COPD (J44.9)   Psychological Disorders  []Anxiety (F41.9)  []Depression (F32.9)   []Other:   []Other:          Barriers to/and or personal factors that will affect rehab potential:              []Age  []Sex    []Smoker              []Motivation/Lack of Motivation                        []Co-Morbidities              []Cognitive Function, education/learning barriers              []Environmental, home barriers              []profession/work barriers  []past PT/medical experience  []other:  Justification:     Falls Risk Assessment (30 days): []NA   [x] Falls Risk assessed and no intervention required. [] Falls Risk assessed and Patient requires intervention due to being higher risk   Tinetti score :     [] Falls education provided, including:       ASSESSMENT  :   Pt is  55 Y. O  female, presenting with symptoms of vertigo and imbalance. 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  Assessment reveals pt is negative for BPPV and has abnormal oculomotor exam and + head thrust test to the L, all suggesting probable unilateral vestibular hypofunction L ear, possibly as a result of vestibular neuritis. Pt will benefit from PT to address vestibular complaints and promote return to highest level of functional independence.         Functional Impairments:     []Noted lumbar/proximal hip/LE hypomobility   []Decreased LE functional ROM   []Decreased core/proximal hip strength and neuromuscular control   []Decreased LE functional strength   []Reduced balance/proprioceptive control   []other:       []Noted cervical/thoracic/Glenohumeral joint hypomobility  []Decreased cervical/UE functional ROM  []Decreased Deep Cervical Flexor control or ability to hold head up  []Decreased Rotator Cuff/scapular/core strength and neuromuscular control   []Decreased Upper Extremity functional strength   []Abnormal reflexes/sensation/myotomal/dermatomal deficits  []Noted Headache pain aggravated by neck movements with/without dizziness    [x]Subjective complaint of dizziness/vertigo/imbalance  [x]Decreased gaze stabilization      Functional Activity Limitations (from functional questionnaire and intake)   []Reduced tolerance to prolonged functional positions  [x]Reduced tolerance to changes of positions or transfers between positions  []Reduced ability to maintain good posture and demonstrate good body mechanics with sitting, bending, and lifting  []Reduced ability to perform lifting, reaching, carrying tasks    []Reduced ability to sleep   [x]Reduced tolerance to driving  []Reduced tolerance to computer work  [x]Reduced ability to concentrate    [x]Reduced ability to ambulate prolonged functional periods/distances     Participation Restrictions   []Reduced participation in self care activities   [x]Reduced participation in home management activities   [x]Reduced participation in work activities   [x]Reduced participation in social activities. [x]Reduced participation in sport/recreational activities.     Classification:   []signs/symptoms consistent with BPPV      [x]signs/symptoms consistent with vestibular hypofunction/decreased gaze stabilization     []signs/symptoms consistent with cervicogenic dizziness   []signs/symptoms consistent with neck pain with headaches (cervicogenic)   []signs/symptoms consistent with nerve root involvement including myotome & dermatome dysfunction  []signs/symptoms consistent suggesting central cord compression/UMN syndromes    []sign/symptoms consistent with vestibular migraine    []signs/symptoms consistent with Meniere's disease     []signs/symptoms consistent with central disorder  []signs/symptoms consistent with concussion     []signs/symptoms consistent with balance impairment   []signs/symptoms consistent with musculoskeletal status including decreased ROM, strength and function. []signs/symptoms consistent with abnormality of gait    []signs/symptoms consistent with motion sensitivity  []signs/symptoms consistent with PPPD  []signs/symptoms consistent with other vestibular disorder, specify:          Prognosis/Rehab Potential:      []Excellent   [x]Good    []Fair   []Poor    Tolerance of evaluation/treatment:    []Excellent   [x]Good    []Fair   []Poor     Physical Therapy Evaluation Complexity Justification  [x] A history of present problem with:  [x] no personal factors and/or comorbidities that impact the plan of care;  []1-2 personal factors and/or comorbidities that impact the plan of care  []3 personal factors and/or comorbidities that impact the plan of care  [x] An examination of body systems using standardized tests and measures addressing any of the following: body structures and functions (impairments), activity limitations, and/or participation restrictions;:  [] a total of 1-2 or more elements   [x] a total of 3 or more elements   [] a total of 4 or more elements   [x] A clinical presentation with:  [x] stable and/or uncomplicated characteristics   [] evolving clinical presentation with changing characteristics  [] unstable and unpredictable characteristics;   [x] Clinical decision making of [x] low, [] moderate, [] high complexity using standardized patient assessment instrument and/or measurable assessment of functional outcome. [x] EVAL (LOW) 10084 (typically 20 minutes face-to-face)  [] EVAL (MOD) 76568 (typically 30 minutes face-to-face)  [] EVAL (HIGH) 90388 (typically 45 minutes face-to-face)  [] RE-EVAL       PLAN: Begin PT focusing on:    Progression of gaze stability exs    Assess balance and make LTG if needed   Habituation exs if needed     Frequency/Duration:  1-2 days per week for 6 Weeks:  Interventions:  [x]  Therapeutic exercise including: strength training, ROM, including postural re-education.    [x]  NMR activation and proprioception, postural re-education, gaze stabilization, habituation, and adaptation   [x]  Gait training/stair training to facilitate maximum independence with ambulation and stair climbing with or without AD. [x]  Therapeutic Activity to include dynamic activities to improve functional performance such as transfer training, lifting, bending, carrying, reaching. [x]  Manual therapy as indicated for C/T spine, ribs, Soft tissue to include: Dry Needling/IASTM, STM, PROM, Gr I-IV mobilizations, manipulation. [] Modalities as needed that may include: thermal agents, E-stim, Biofeedback, US, iontophoresis as indicated  [x] Patient education on vestibular disorders, postural re-education, activity modification, progression of HEP.    [x] CRP to treat to assess and treat BPPV      HEP instruction: Pt provided HEP via [address]:   3 weeks MET NOT MET Long Term Goals:  6  weeks MET NOT MET   Establish HEP [] [] Pt indep with HEP [] []   Pt able to perform VOR x 1 viewing exercises 90 reps per minute  [] [] No evidence of BPPV as indicated by negative positional tests  [] []   Improve DHI score to 25/100  [] [] Improve DHI score to 10/100 or less   [] []   Assess balance and make LTG if needed [] [] Balance goal TBD [] []   Pt educated on vestibular disorder  [] [] Gait WNL [] []    [] [] Return to prior level of function  [] []         Electronically signed by:  Cindy Garcia PT, OMT-C, [id number]

## 2021-04-23 NOTE — FLOWSHEET NOTE
Physical Therapy Daily Treatment Note  Date:  2021    Patient Name:  Marizol Summesr    :  1975  MRN: [id number]      Medical/Treatment Diagnosis Information:  Diagnosis: vertigo  Treatment Diagnosis: unilateral vestibular hypofunction, L ear, vertigo, imbalance  ·   Insurance/Certification information:  PT Insurance Information: MMO, mercy no max, no precert    Physician Information:   Dr. Gentry Liang of care signed (Y/N):     Date of Patient follow up with Physician:     Is this a Progress Report:     []  Yes  [x]  No      If Yes:  Date Range for reporting period:  Beginning  Ending    Progress report will be due (10 Rx or 30 days whichever is less): 98   Recertification will be due (POC Duration  / 90 days whichever is less):21         Visit # Insurance Allowable Auth Required   -  BMN []  Yes [x]  No        Functional Scale: [facility] [address] 44/100    Date assessed:  21        Latex Allergy:  [x]NO      []YES  Preferred Language for Healthcare:   [x]English       []other:    Pain level: At eval:   YES  Location: cervical pain for past 6 months                                 4/10 pain at present   6/10 pain at its worst    SUBJECTIVE:  See eval      RESTRICTIONS/PRECAUTIONS: NA      OBJECTIVE: See eval        Exercises/Interventions:     Exercises in bold performed in department today. Items not bolded are carried forward from prior visits for continuity of the record. Exercise/Equipment Resistance/Repetitions HEP Other comments       []      VOR x 1 viewing, standing, far target Pt inst to do this exercise x 1 minute with horizontal head turns and x 1 minute with vertical head nods. Pt to only go as fast as he/she can while keeping target clear and in focus. Pt inst to do this ex 3x/day. [x]        []      VOR x 1 viewing, standing, near target Pt inst to do this exercise x 1 minute with horizontal head turns and x 1 minute with vertical head nods.   Pt to only go as fast as he/she can while keeping target clear and in focus. Pt inst to do this ex 3x/day. [x]        []      Gait exercise  Pt inst to take a walk for 10-20 minutes 1x daily, preferable outdoors. [x]        []        []        []          []         []        []        []        []        []        []        []        []      Therapeutic Exercise/Home Exercise Program:   0 minutes    Therapeutic Activity:  0 minutes     Gait: 0 minutes    Neuromuscular Re-Education:  25 minutes  Pt inst in role of PT, prognosis, plan of care, use of CP/HP, activity modification, and benefits of therapy. HEP has been established, See above, pt given handout(s) of new exercises. Initiated gaze stability exs, see above  Pt educated on vestibular neuritis/hypofunction and given handout       Canalith Repositioning Procedure:      Manual Therapy:  0 minutes    Modalities: 0 minutes        Therapeutic Exercise and NMR EXR  [] (52672) Provided verbal/tactile cueing for activities related to strengthening, flexibility, endurance, ROM  for improvements in proximal hip and core control with self care, mobility, lifting and ambulation. [x] (10001) Provided verbal/tactile cueing for activities related to improving balance, coordination, kinesthetic sense, posture, motor skill, proprioception  to assist with core control in self care, mobility, lifting, and ambulation.      Therapeutic Activities:    [] (82121 or 36638) Provided verbal/tactile cueing for activities related to improving balance, coordination, kinesthetic sense, posture, motor skill, proprioception and motor activation to allow for proper function  with self care and ADLs  [] (98261) Provided training and instruction to the patient for proper core and proximal hip recruitment and positioning with ambulation re-education     Home Exercise Program:    [] (36018) Reviewed/Progressed HEP activities related to strengthening, flexibility, endurance, ROM of core, proximal hip and LE for functional self-care, mobility, lifting and ambulation   [x] (71201) Reviewed/Progressed HEP activities related to improving balance, coordination, kinesthetic sense, posture, motor skill, proprioception of core, proximal hip and LE for self care, mobility, lifting, and ambulation      Manual Treatments:  PROM / STM / Oscillations-Mobs:  G-I, II, III, IV (PA's, Inf., Post.)  [] (00108) Provided manual therapy to mobilize proximal hip and LS spine soft tissue/joints for the purpose of modulating pain, promoting relaxation,  increasing ROM, reducing/eliminating soft tissue swelling/inflammation/restriction, improving soft tissue extensibility and allowing for proper ROM for normal function with self care, mobility, lifting and ambulation. Modalities:       Charges:  Timed Code Treatment Minutes: 25   Total Treatment Minutes: 60     [x] EVAL  [] RM(04048) x     [] IONTO  [x] NMR (16972) x 2    [] VASO  [] Manual (67298) x      [] Other:  [] TA x     [] Gait x   [] Mech Traction (62800)  [] ES(attended) (55308)     [] ES (un) (47440):         ASSESSMENT:  Pt is  55 Y. O  female, presenting with symptoms of vertigo and imbalance. 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  Assessment reveals pt is negative for BPPV and has abnormal oculomotor exam and + head thrust test to the L, all suggesting probable unilateral vestibular hypofunction L ear, possibly as a result of vestibular neuritis.  Pt will benefit from PT to address vestibular complaints and promote return to highest level of functional independence    GOALS      Short Term Goals:   3 weeks MET NOT MET Long Term Goals:  6  weeks MET NOT MET   Establish HEP []?  []?  Pt indep with HEP []?  []?    Pt able to perform VOR x 1 viewing exercises 90 reps per minute  []?  []?  No evidence of BPPV as indicated by negative positional tests  []?  []?    Improve DHI score to 25/100  []?  []?  Improve DHI score to 10/100 or less   []?  []?    Assess balance and make LTG if needed []?  []?  Balance goal TBD []?  []?    Pt educated on vestibular disorder  []?  []?  Gait WNL []?  []?      []?  []?  Return to prior level of function  []?  []?          Overall Progression Towards Functional goals/ Treatment Progress Update:  [] Patient is progressing as expected towards functional goals listed. [] Progression is slowed due to complexities/Impairments listed. [] Progression has been slowed due to co-morbidities. [x] Plan just implemented, too soon to assess goals progression <30days   [] Goals require adjustment due to lack of progress  [] Patient is not progressing as expected and requires additional follow up with physician  [] Other    Prognosis for POC: [x] Good [] Fair  [] Poor      Patient requires continued skilled intervention: [x] Yes  [] No    Treatment/Activity Tolerance:  [x] Patient able to complete treatment  [] Patient limited by fatigue  [] Patient limited by pain    [] Patient limited by other medical complications  [] Other:         PLAN: See eval  [] Continue per plan of care [] Alter current plan (see comments above)  [x] Plan of care initiated [] Hold pending MD visit [] Discharge    Plan Moving Forward/ For next visit:   · Progression of gaze stability exs   · Assess balance and make LTG if needed  · Habituation exs if needed         Electronically signed by: Marylin Beverly PT, OMT-C, [id number]    Note: If patient does not return for scheduled/ recommended follow up visits, this note will serve as a discharge from care along with most recent update on progress.

## 2021-04-29 ENCOUNTER — HOSPITAL ENCOUNTER (OUTPATIENT)
Dept: PHYSICAL THERAPY | Age: 46
Setting detail: THERAPIES SERIES
Discharge: HOME | End: 2021-04-29
Payer: COMMERCIAL

## 2021-04-29 NOTE — PROGRESS NOTES
Outpatient Physical Therapy Discharge Note         Date:  4/28/2021    Patient Name:  Geo Casper         YOB: 1975    Medical/Treatment Diagnosis Information:  · Diagnosis: vertigo  · Treatment Diagnosis: unilateral vestibular hypofunction, L ear, vertigo, imbalance  ·    Insurance/Certification information:  PT Insurance Information: MMO, mercy no max, no precert     Physician Information:   Dr. Pamela Saul       Progress Note covers period from (if applicable):    [x]From 4/22/21  To 4/28/21           Visit# / total visits:  1        Subjective:      [] NA due to pt did not show for appointment  [x] Pt called to state she was feeling much better and wants to be discharged from PT       Objective:    [x]Unable to reassess for discharge note due to pt   [x] Cancelled   [] Did not show  [x] Requests discharge   []See last daily/progress note for most recent treatment/assessment         Assessment:    Goals:   Progress towards goals:   [] All Goals Met   [] Goals Partially Met   [x]  Unable to assess if Goals met due to pt did not return. Plan:    [x]Discharge from PT at this time      Thank you for your referral of this patient.         Electronically signed by:  Irina Casey, [address] [facility], T-C, [id number]

## 2021-09-10 ENCOUNTER — TELEPHONE (OUTPATIENT)
Dept: FAMILY MEDICINE CLINIC | Age: 46
End: 2021-09-10

## 2021-09-10 NOTE — TELEPHONE ENCOUNTER
----- Message from Cat Owens sent at 9/10/2021 12:47 PM EDT -----  Subject: Message to Provider    QUESTIONS  Information for Provider? New pt. is trying to see pcp Maxim Haywood and she wants   to know if she needs her pt records sent over prior to the appt. Please   contact.   ---------------------------------------------------------------------------  --------------  CALL BACK INFO  What is the best way for the office to contact you? OK to leave message on   voicemail, OK to respond with electronic message via [employer] portal (only   for patients who have registered [employer] account)  Preferred Call Back Phone Number? [phone]  ---------------------------------------------------------------------------  --------------  SCRIPT ANSWERS  Relationship to Patient?  Self

## 2021-11-23 ENCOUNTER — OFFICE VISIT (OUTPATIENT)
Dept: FAMILY MEDICINE CLINIC | Age: 46
End: 2021-11-23
Payer: COMMERCIAL

## 2021-11-23 VITALS — HEART RATE: 89 BPM | OXYGEN SATURATION: 99 % | SYSTOLIC BLOOD PRESSURE: 128 MMHG | DIASTOLIC BLOOD PRESSURE: 70 MMHG

## 2021-11-23 DIAGNOSIS — Z11.59 NEED FOR HEPATITIS C SCREENING TEST: ICD-10-CM

## 2021-11-23 DIAGNOSIS — E55.9 VITAMIN D DEFICIENCY: ICD-10-CM

## 2021-11-23 DIAGNOSIS — E03.9 ACQUIRED HYPOTHYROIDISM: ICD-10-CM

## 2021-11-23 DIAGNOSIS — E03.9 ACQUIRED HYPOTHYROIDISM: Primary | ICD-10-CM

## 2021-11-23 DIAGNOSIS — E78.2 MIXED HYPERLIPIDEMIA: ICD-10-CM

## 2021-11-23 DIAGNOSIS — Z12.11 SCREENING FOR COLON CANCER: ICD-10-CM

## 2021-11-23 DIAGNOSIS — D50.9 IRON DEFICIENCY ANEMIA, UNSPECIFIED IRON DEFICIENCY ANEMIA TYPE: ICD-10-CM

## 2021-11-23 DIAGNOSIS — G43.109 MIGRAINE WITH AURA AND WITHOUT STATUS MIGRAINOSUS, NOT INTRACTABLE: ICD-10-CM

## 2021-11-23 LAB
BASOPHILS ABSOLUTE: 0.1 K/UL (ref 0–0.2)
BASOPHILS RELATIVE PERCENT: 0.6 %
EOSINOPHILS ABSOLUTE: 0 K/UL (ref 0–0.6)
EOSINOPHILS RELATIVE PERCENT: 0.4 %
HCT VFR BLD CALC: 36.6 % (ref 36–48)
HEMOGLOBIN: 11.3 G/DL (ref 12–16)
LYMPHOCYTES ABSOLUTE: 1.5 K/UL (ref 1–5.1)
LYMPHOCYTES RELATIVE PERCENT: 13.8 %
MCH RBC QN AUTO: 23.8 PG (ref 26–34)
MCHC RBC AUTO-ENTMCNC: 30.9 G/DL (ref 31–36)
MCV RBC AUTO: 77 FL (ref 80–100)
MONOCYTES ABSOLUTE: 0.8 K/UL (ref 0–1.3)
MONOCYTES RELATIVE PERCENT: 7.7 %
NEUTROPHILS ABSOLUTE: 8.1 K/UL (ref 1.7–7.7)
NEUTROPHILS RELATIVE PERCENT: 77.5 %
PDW BLD-RTO: 16.2 % (ref 12.4–15.4)
PLATELET # BLD: 372 K/UL (ref 135–450)
PMV BLD AUTO: 8.3 FL (ref 5–10.5)
RBC # BLD: 4.75 M/UL (ref 4–5.2)
WBC # BLD: 10.5 K/UL (ref 4–11)

## 2021-11-23 PROCEDURE — 99203 OFFICE O/P NEW LOW 30 MIN: CPT | Performed by: FAMILY MEDICINE

## 2021-11-23 RX ORDER — RIZATRIPTAN BENZOATE 10 MG/1
10 TABLET, ORALLY DISINTEGRATING ORAL
Qty: 30 TABLET | Refills: 5 | Status: SHIPPED | OUTPATIENT
Start: 2021-11-23 | End: 2022-02-10

## 2021-11-23 RX ORDER — LEVOTHYROXINE SODIUM 0.07 MG/1
75 TABLET ORAL
Qty: 90 TABLET | Refills: 1 | Status: SHIPPED | OUTPATIENT
Start: 2021-11-23 | End: 2021-11-24 | Stop reason: SDUPTHER

## 2021-11-23 RX ORDER — ONDANSETRON 4 MG/1
4 TABLET, ORALLY DISINTEGRATING ORAL EVERY 8 HOURS PRN
Qty: 30 TABLET | Refills: 1 | Status: SHIPPED | OUTPATIENT
Start: 2021-11-23 | End: 2022-05-26 | Stop reason: SDUPTHER

## 2021-11-23 RX ORDER — METHYLPREDNISOLONE 4 MG/1
TABLET ORAL
COMMUNITY
Start: 2021-11-17 | End: 2022-02-08 | Stop reason: ALTCHOICE

## 2021-11-23 SDOH — ECONOMIC STABILITY: FOOD INSECURITY: WITHIN THE PAST 12 MONTHS, YOU WORRIED THAT YOUR FOOD WOULD RUN OUT BEFORE YOU GOT MONEY TO BUY MORE.: NEVER TRUE

## 2021-11-23 SDOH — ECONOMIC STABILITY: FOOD INSECURITY: WITHIN THE PAST 12 MONTHS, THE FOOD YOU BOUGHT JUST DIDN'T LAST AND YOU DIDN'T HAVE MONEY TO GET MORE.: NEVER TRUE

## 2021-11-23 ASSESSMENT — PATIENT HEALTH QUESTIONNAIRE - PHQ9
SUM OF ALL RESPONSES TO PHQ QUESTIONS 1-9: 0
SUM OF ALL RESPONSES TO PHQ QUESTIONS 1-9: 0
SUM OF ALL RESPONSES TO PHQ9 QUESTIONS 1 & 2: 0
SUM OF ALL RESPONSES TO PHQ QUESTIONS 1-9: 0
2. FEELING DOWN, DEPRESSED OR HOPELESS: 0
1. LITTLE INTEREST OR PLEASURE IN DOING THINGS: 0

## 2021-11-23 ASSESSMENT — ENCOUNTER SYMPTOMS
PHOTOPHOBIA: 1
NAUSEA: 1

## 2021-11-23 ASSESSMENT — SOCIAL DETERMINANTS OF HEALTH (SDOH): HOW HARD IS IT FOR YOU TO PAY FOR THE VERY BASICS LIKE FOOD, HOUSING, MEDICAL CARE, AND HEATING?: NOT HARD AT ALL

## 2021-11-23 NOTE — PROGRESS NOTES
El Bui is a 55 y.o. female    Chief Complaint   Patient presents with    Hypothyroidism     Synthyroid medication; been having heavy periods over the past couple of years     Other     [place]- low iron     Blood Work    Migraine    New Patient       HPI:    This is a new patient establishing care. Migraine   This is a chronic problem. The current episode started more than 1 year ago. The problem occurs monthly. The problem has been waxing and waning. The pain is located in the right unilateral region. Associated symptoms include nausea, phonophobia and photophobia. The symptoms are aggravated by menstrual cycle. She has tried triptans for the symptoms. The treatment provided significant relief. Her past medical history is significant for migraine headaches. Acquired hypothyroidism. This is a chronic issue. The patient takes her medicine in the morning on an empty stomach. Her recent TSH was not within normal limits. Iron deficiency anemia. This is a chronic issue. The patient was found to be iron deficient in the past but could not tolerate iron supplementation. She does have heavy periods. Due for colon cancer screening. Works as a respiratory therapist.     ROS:    Review of Systems   Eyes: Positive for photophobia. Gastrointestinal: Positive for nausea. /70   Pulse 89   SpO2 99%     Physical Exam:    Physical Exam  Constitutional:       General: She is not in acute distress. Appearance: She is well-developed and normal weight. She is not toxic-appearing. HENT:      Head: Normocephalic. Cardiovascular:      Rate and Rhythm: Normal rate and regular rhythm. Pulses: Normal pulses. Heart sounds: No murmur heard. Pulmonary:      Effort: Pulmonary effort is normal. No respiratory distress. Breath sounds: Normal breath sounds. No wheezing. Musculoskeletal:         General: Normal range of motion.       Cervical back: Normal range of motion. No rigidity. Lymphadenopathy:      Cervical: No cervical adenopathy. Skin:     General: Skin is warm and dry. Neurological:      Mental Status: She is alert. Psychiatric:         Mood and Affect: Mood normal.         Behavior: Behavior normal.         Thought Content: Thought content normal.         Current Outpatient Medications   Medication Sig Dispense Refill    levothyroxine (SYNTHROID) 75 MCG tablet Take 1 tablet by mouth every morning (before breakfast) 90 tablet 1    rizatriptan (MAXALT-MLT) 10 MG disintegrating tablet Take 1 tablet by mouth once as needed for Migraine May repeat in 2 hours if needed 30 tablet 5    ondansetron (ZOFRAN ODT) 4 MG disintegrating tablet Take 1 tablet by mouth every 8 hours as needed for Nausea or Vomiting 30 tablet 1    methylPREDNISolone (MEDROL DOSEPACK) 4 MG tablet       albuterol sulfate HFA (VENTOLIN HFA) 108 (90 Base) MCG/ACT inhaler Inhale 2 puffs into the lungs every 6 hours as needed for Wheezing      fluticasone (FLONASE) 50 MCG/ACT nasal spray 1 spray by Each Nostril route daily      ferrous sulfate (IRON 325) 325 (65 Fe) MG tablet Take 325 mg by mouth 2 times daily       No current facility-administered medications for this visit. Assessment:    1. Acquired hypothyroidism    2. Iron deficiency anemia, unspecified iron deficiency anemia type    3. Migraine with aura and without status migrainosus, not intractable    4. Mixed hyperlipidemia    5. Screening for colon cancer    6. Need for hepatitis C screening test    7. Vitamin D deficiency        Plan:    1. Acquired hypothyroidism  Recheck TSH. - levothyroxine (SYNTHROID) 75 MCG tablet; Take 1 tablet by mouth every morning (before breakfast)  Dispense: 90 tablet; Refill: 1  - TSH with Reflex; Future    2. Iron deficiency anemia, unspecified iron deficiency anemia type  Recheck iron and CBC.   If she is anemic, would recommend seeing hematology as she cannot tolerate iron supplementation even with food. - Iron and TIBC  - Ferritin    3. Migraine with aura and without status migrainosus, not intractable  Refill Maxalt. Given Zofran for nausea. - rizatriptan (MAXALT-MLT) 10 MG disintegrating tablet; Take 1 tablet by mouth once as needed for Migraine May repeat in 2 hours if needed  Dispense: 30 tablet; Refill: 5  - Magnesium  - ondansetron (ZOFRAN ODT) 4 MG disintegrating tablet; Take 1 tablet by mouth every 8 hours as needed for Nausea or Vomiting  Dispense: 30 tablet; Refill: 1    4. Mixed hyperlipidemia  Encouraged a diet low in saturated fat/cholesterol, small portion sizes and regular exercises. - CBC Auto Differential; Future  - Comprehensive Metabolic Panel; Future  - Lipid Panel; Future  - Vitamin B12 & Folate; Future    5. Screening for colon cancer  Given Cologuard as she has no family history of colon cancer.  - Cologuard; Future    6. Need for hepatitis C screening test  - Hepatitis C Antibody; Future    7. Vitamin D deficiency  - Vitamin D 25 Hydroxy      Return in about 6 months (around 5/23/2022) for Preventative.

## 2021-11-24 DIAGNOSIS — D50.9 IRON DEFICIENCY ANEMIA, UNSPECIFIED IRON DEFICIENCY ANEMIA TYPE: Primary | ICD-10-CM

## 2021-11-24 DIAGNOSIS — E03.9 ACQUIRED HYPOTHYROIDISM: ICD-10-CM

## 2021-11-24 LAB
A/G RATIO: 2.2 (ref 1.1–2.2)
ALBUMIN SERPL-MCNC: 4.9 G/DL (ref 3.4–5)
ALP BLD-CCNC: 62 U/L (ref 40–129)
ALT SERPL-CCNC: 22 U/L (ref 10–40)
ANION GAP SERPL CALCULATED.3IONS-SCNC: 16 MMOL/L (ref 3–16)
AST SERPL-CCNC: 27 U/L (ref 15–37)
BILIRUB SERPL-MCNC: <0.2 MG/DL (ref 0–1)
BUN BLDV-MCNC: 17 MG/DL (ref 7–20)
CALCIUM SERPL-MCNC: 9.7 MG/DL (ref 8.3–10.6)
CHLORIDE BLD-SCNC: 101 MMOL/L (ref 99–110)
CHOLESTEROL, TOTAL: 242 MG/DL (ref 0–199)
CO2: 20 MMOL/L (ref 21–32)
CREAT SERPL-MCNC: 0.6 MG/DL (ref 0.6–1.1)
FERRITIN: 6.6 NG/ML (ref 15–150)
FOLATE: 4.51 NG/ML (ref 4.78–24.2)
GFR AFRICAN AMERICAN: >60
GFR NON-AFRICAN AMERICAN: >60
GLUCOSE BLD-MCNC: 84 MG/DL (ref 70–99)
HDLC SERPL-MCNC: 67 MG/DL (ref 40–60)
HEPATITIS C ANTIBODY INTERPRETATION: NORMAL
IRON SATURATION: 7 % (ref 15–50)
IRON: 29 UG/DL (ref 37–145)
LDL CHOLESTEROL CALCULATED: 153 MG/DL
MAGNESIUM: 2 MG/DL (ref 1.8–2.4)
POTASSIUM SERPL-SCNC: 4.3 MMOL/L (ref 3.5–5.1)
SODIUM BLD-SCNC: 137 MMOL/L (ref 136–145)
T4 FREE: 1.3 NG/DL (ref 0.9–1.8)
TOTAL IRON BINDING CAPACITY: 388 UG/DL (ref 260–445)
TOTAL PROTEIN: 7.1 G/DL (ref 6.4–8.2)
TRIGL SERPL-MCNC: 112 MG/DL (ref 0–150)
TSH REFLEX: 4.42 UIU/ML (ref 0.27–4.2)
VITAMIN B-12: 729 PG/ML (ref 211–911)
VITAMIN D 25-HYDROXY: 24.3 NG/ML
VLDLC SERPL CALC-MCNC: 22 MG/DL

## 2021-11-24 RX ORDER — LEVOTHYROXINE SODIUM 88 UG/1
88 TABLET ORAL
Qty: 90 TABLET | Refills: 1 | Status: SHIPPED | OUTPATIENT
Start: 2021-11-24 | End: 2022-05-26

## 2021-12-15 ENCOUNTER — HOSPITAL ENCOUNTER (OUTPATIENT)
Dept: MRI IMAGING | Age: 46
Discharge: HOME | End: 2021-12-15
Payer: COMMERCIAL

## 2021-12-15 DIAGNOSIS — M79.672 PAIN IN LEFT FOOT: ICD-10-CM

## 2021-12-15 DIAGNOSIS — Q66.6 OTHER CONGENITAL VALGUS DEFORMITIES OF FEET: ICD-10-CM

## 2021-12-15 DIAGNOSIS — M76.822 POSTERIOR TIBIAL TENDINITIS, LEFT LEG: ICD-10-CM

## 2021-12-15 DIAGNOSIS — M66.372 SPONTANEOUS RUPTURE OF FLEXOR TENDONS, LEFT ANKLE AND FOOT: ICD-10-CM

## 2021-12-15 PROCEDURE — 73721 MRI JNT OF LWR EXTRE W/O DYE: CPT

## 2022-01-27 RX ORDER — ACETAMINOPHEN 160 MG
TABLET,DISINTEGRATING ORAL
COMMUNITY

## 2022-01-27 RX ORDER — LANOLIN ALCOHOL/MO/W.PET/CERES
400 CREAM (GRAM) TOPICAL DAILY
COMMUNITY

## 2022-01-31 ENCOUNTER — ANESTHESIA EVENT (OUTPATIENT)
Dept: OPERATING ROOM | Age: 47
End: 2022-01-31
Payer: COMMERCIAL

## 2022-02-02 ENCOUNTER — TELEPHONE (OUTPATIENT)
Dept: FAMILY MEDICINE CLINIC | Age: 47
End: 2022-02-02

## 2022-02-02 NOTE — TELEPHONE ENCOUNTER
Pt said she called yesterday to confirm, not today. Not understanding why this message was put in today and not yesterday when the patient called.

## 2022-02-02 NOTE — TELEPHONE ENCOUNTER
----- Message from Pato Pineda sent at 2/2/2022  8:44 AM EST -----  Subject: Message to Provider    QUESTIONS  Information for Provider? PATIENT CALLED TO CONFIRM PRE OP APPOINTMENT   2/8/2022 @1:00PM  ---------------------------------------------------------------------------  --------------  CALL BACK INFO  What is the best way for the office to contact you? OK to leave message on   voicemail  Preferred Call Back Phone Number?  [phone]  ---------------------------------------------------------------------------  --------------  SCRIPT ANSWERS  undefined

## 2022-02-07 ENCOUNTER — HOSPITAL ENCOUNTER (OUTPATIENT)
Age: 47
Discharge: HOME | End: 2022-02-07
Payer: COMMERCIAL

## 2022-02-07 PROCEDURE — U0005 INFEC AGEN DETEC AMPLI PROBE: HCPCS

## 2022-02-07 PROCEDURE — U0003 INFECTIOUS AGENT DETECTION BY NUCLEIC ACID (DNA OR RNA); SEVERE ACUTE RESPIRATORY SYNDROME CORONAVIRUS 2 (SARS-COV-2) (CORONAVIRUS DISEASE [COVID-19]), AMPLIFIED PROBE TECHNIQUE, MAKING USE OF HIGH THROUGHPUT TECHNOLOGIES AS DESCRIBED BY CMS-2020-01-R: HCPCS

## 2022-02-08 ENCOUNTER — OFFICE VISIT (OUTPATIENT)
Dept: FAMILY MEDICINE CLINIC | Age: 47
End: 2022-02-08
Payer: COMMERCIAL

## 2022-02-08 VITALS
OXYGEN SATURATION: 98 % | BODY MASS INDEX: 27.94 KG/M2 | SYSTOLIC BLOOD PRESSURE: 108 MMHG | HEART RATE: 75 BPM | DIASTOLIC BLOOD PRESSURE: 80 MMHG | WEIGHT: 162.8 LBS

## 2022-02-08 DIAGNOSIS — M77.50 TENDONITIS OF FOOT: ICD-10-CM

## 2022-02-08 DIAGNOSIS — M72.2 PLANTAR FASCIITIS: ICD-10-CM

## 2022-02-08 DIAGNOSIS — Z01.818 PRE-OPERATIVE EXAM: Primary | ICD-10-CM

## 2022-02-08 LAB
ANION GAP SERPL CALCULATED.3IONS-SCNC: 11 MMOL/L (ref 3–16)
BUN BLDV-MCNC: 10 MG/DL (ref 7–20)
CALCIUM SERPL-MCNC: 8.8 MG/DL (ref 8.3–10.6)
CHLORIDE BLD-SCNC: 102 MMOL/L (ref 99–110)
CO2: 24 MMOL/L (ref 21–32)
CREAT SERPL-MCNC: 0.5 MG/DL (ref 0.6–1.1)
GFR AFRICAN AMERICAN: >60
GFR NON-AFRICAN AMERICAN: >60
GLUCOSE BLD-MCNC: 81 MG/DL (ref 70–99)
POTASSIUM SERPL-SCNC: 3.8 MMOL/L (ref 3.5–5.1)
SARS-COV-2: NOT DETECTED
SODIUM BLD-SCNC: 137 MMOL/L (ref 136–145)

## 2022-02-08 PROCEDURE — 99214 OFFICE O/P EST MOD 30 MIN: CPT | Performed by: NURSE PRACTITIONER

## 2022-02-08 NOTE — PROGRESS NOTES
Preoperative Evaluation    Subjective:   David Bob is a 52 y.o. y.o.  female who presents to the office today for a preoperative consultation. Surgeon: Dr. Sherrie Anderson    Location: [facility]  Performing:  Endoscopic plantar fasciotomy left, tenosynovectomy posterior tibial tendon/possibly flexor digitorum longus left foot   Date: February 10. Planned anesthesia is General.   The patient has the following known anesthesia issues: none   Patient has a bleeding risk of : h/o heavy menstrual bleeding and has had 2 iron infusions 2 months ago. Follows [facility] - recent labs done 1/31/2022 and were normal.  Patient does not have objection to receiving blood products if needed. Denies any steroid use in the past 6 mo    Review of Systems   Pertinent items are noted in HPI.      Denies fevers, chills, diaphoresis, CP, SOB, dysphagia, abd pain, urinary complaints, new edema, rashes, cyanosis    Past Medical History:   Diagnosis Date    Anemia     iron supplement    Headache(784.0)     Herpes simplex without mention of complication     last outbreak 2 months ago      Hypothyroidism        Past Surgical History:   Procedure Laterality Date    ACHILLES TENDON SURGERY Right 9/22/2020    MODIFIED BROSTROM PROCEDURE RIGHT ANKLE, TENOSYNOVECTOMY  POSTERIOR TIBIAL TENDON RIGHT, APPLICATION POSTERIOR  SPLINT RIGHT performed by Pelon Hurst DPM at [facility], LAPAROSCOPIC  2007   [place] AND CURETTAGE OF UTERUS  2009    HERNIA REPAIR      KNEE SURGERY  2005    rt knee surgery       Social History     Tobacco History     Smoking Status  Never Smoker    Smokeless Tobacco Use  Never Used          Alcohol History     Alcohol Use Status  Yes Drinks/Week  10 Standard drinks or equivalent per week Amount  8.3 standard drinks of alcohol/wk          Drug Use     Drug Use Status  No          Sexual Activity     Sexually Active  Yes Partners  Male Family History   Problem Relation Age of Onset    Asthma Mother     High Blood Pressure Mother     Stroke Mother     High Blood Pressure Father     High Cholesterol Father     Asthma Brother     High Blood Pressure Maternal Grandfather        Current Outpatient Medications   Medication Sig Dispense Refill    Cholecalciferol (VITAMIN D3) 50 MCG (2000 UT) CAPS Take by mouth      folic acid (FOLVITE) 346 MCG tablet Take 400 mcg by mouth daily      levothyroxine (SYNTHROID) 88 MCG tablet Take 1 tablet by mouth every morning (before breakfast) 90 tablet 1    rizatriptan (MAXALT-MLT) 10 MG disintegrating tablet Take 1 tablet by mouth once as needed for Migraine May repeat in 2 hours if needed 30 tablet 5    ondansetron (ZOFRAN ODT) 4 MG disintegrating tablet Take 1 tablet by mouth every 8 hours as needed for Nausea or Vomiting 30 tablet 1    albuterol sulfate HFA (VENTOLIN HFA) 108 (90 Base) MCG/ACT inhaler Inhale 2 puffs into the lungs every 6 hours as needed for Wheezing      fluticasone (FLONASE) 50 MCG/ACT nasal spray 1 spray by Each Nostril route daily       No current facility-administered medications for this visit. Objective:   Vitals:    02/08/22 1308   BP: 108/80   Pulse: 75   SpO2: 98%       Physical Exam   /80 (Site: Left Upper Arm, Position: Sitting, Cuff Size: Large Adult)   Pulse 75   Wt 162 lb 12.8 oz (73.8 kg)   SpO2 98%   BMI 27.94 kg/m²   General appearance: alert, appears stated age, and cooperative  Throat: lips, mucosa, and tongue normal; teeth and gums normal  Lungs: clear to auscultation bilaterally  Heart: regular rate and rhythm, S1, S2 normal, no murmur, click, rub or gallop  Extremities: extremities normal, atraumatic, no cyanosis or edema  Pulses: 2+ and symmetric  Skin: Skin color, texture, turgor normal. No rashes or lesions  Neurologic: Grossly normal     Predictors of intubation difficulty:   Morbid obesity? no   Anatomically abnormal facies?  no Short, thick neck? no   Neck range of motion: normal   Mallampati score: I (soft palate, uvula, fauces, tonsillar pillars visible)   Dentition: No chipped, loose, or missing teeth.      Lab Review   Hospital Outpatient Visit on 02/07/2022   Component Date Value    SARS-CoV-2 02/07/2022 Not Detected    Orders Only on 01/31/2022   Component Date Value    Soluble Transferrin Rece* 01/31/2022 2.7    Orders Only on 12/06/2021   Component Date Value    Soluble Transferrin Rece* 12/06/2021 7.5*   Orders Only on 11/23/2021   Component Date Value    TSH 11/23/2021 4.42*    Vitamin B-12 11/23/2021 729     Folate 11/23/2021 4.51*    Cholesterol, Total 11/23/2021 242*    Triglycerides 11/23/2021 112     HDL 11/23/2021 67*    LDL Calculated 11/23/2021 153*    VLDL Cholesterol Calcula* 11/23/2021 22     Hep C Ab Interp 11/23/2021 Non-reactive     Sodium 11/23/2021 137     Potassium 11/23/2021 4.3     Chloride 11/23/2021 101     CO2 11/23/2021 20*    Anion Gap 11/23/2021 16     Glucose 11/23/2021 84     BUN 11/23/2021 17     CREATININE 11/23/2021 0.6     GFR Non- 11/23/2021 >60     GFR  11/23/2021 >60     Calcium 11/23/2021 9.7     Total Protein 11/23/2021 7.1     Albumin 11/23/2021 4.9     Albumin/Globulin Ratio 11/23/2021 2.2     Total Bilirubin 11/23/2021 <0.2     Alkaline Phosphatase 11/23/2021 62     ALT 11/23/2021 22     AST 11/23/2021 27     WBC 11/23/2021 10.5     RBC 11/23/2021 4.75     Hemoglobin 11/23/2021 11.3*    Hematocrit 11/23/2021 36.6     MCV 11/23/2021 77.0*    MCH 11/23/2021 23.8*    MCHC 11/23/2021 30.9*    RDW 11/23/2021 16.2*    Platelets 13/84/9951 372     MPV 11/23/2021 8.3     Neutrophils % 11/23/2021 77.5     Lymphocytes % 11/23/2021 13.8     Monocytes % 11/23/2021 7.7     Eosinophils % 11/23/2021 0.4     Basophils % 11/23/2021 0.6     Neutrophils Absolute 11/23/2021 8.1*    Lymphocytes Absolute 11/23/2021 1.5     Monocytes Absolute 11/23/2021 0.8     Eosinophils Absolute 11/23/2021 0.0     Basophils Absolute 11/23/2021 0.1     T4 Free 11/23/2021 1.3    Office Visit on 11/23/2021   Component Date Value    Vit D, 25-Hydroxy 11/23/2021 24.3*    Iron 11/23/2021 29*    TIBC 11/23/2021 388     Iron Saturation 11/23/2021 7*    Ferritin 11/23/2021 6.6*    Magnesium 11/23/2021 2.00         Assessment:   52 y.o. female with planned surgery as above. - Known risk factors for perioperative complications: Anemia   - Difficulty with intubation is not anticipated. - Current medications which may produce withdrawal symptoms if withheld perioperatively: none     Plan:   1. Preoperative workup as follows hemoglobin, hematocrit, electrolytes, creatinine, glucose   2. Change in medication regimen before surgery: none, continue med regimen including morning of surgery, w/sip of water   Pt instructed to avoid NSAIDs, ASA, MV, Vitamin E, Fish oil 1 week prior to surgery to decrease bleeding risk. 3. Prophylaxis for cardiac events with perioperative beta-blockers: not indicated   4. Invasive hemodynamic monitoring perioperatively: not indicated   5. Deep vein thrombosis prophylaxis postoperatively:regimen to be chosen by surgical team   6. Other measures: none      1. Pre-operative exam    - Basic Metabolic Panel  - CBC Auto Differential    2. Plantar fasciitis      3. Tendonitis of foot      While assessing care for this patient, I have reviewed all pertinent lab work/imaging/ specialist notes and care in reference to those problems addressed above in detail. Appropriate medical decision making was based on this.        Pt is at an acceptable risk for planned procedure    Please call with any questions    Debbi Howard, CNP

## 2022-02-09 LAB
ACANTHOCYTES: ABNORMAL
ANISOCYTOSIS: ABNORMAL
BASOPHILS ABSOLUTE: 0 K/UL (ref 0–0.2)
BASOPHILS RELATIVE PERCENT: 0.6 %
EOSINOPHILS ABSOLUTE: 0.1 K/UL (ref 0–0.6)
EOSINOPHILS RELATIVE PERCENT: 1.2 %
HCT VFR BLD CALC: 40.7 % (ref 36–48)
HEMATOLOGY PATH CONSULT: NO
HEMOGLOBIN: 13.6 G/DL (ref 12–16)
LYMPHOCYTES ABSOLUTE: 1.3 K/UL (ref 1–5.1)
LYMPHOCYTES RELATIVE PERCENT: 20.4 %
MACROCYTES: ABNORMAL
MCH RBC QN AUTO: 28.9 PG (ref 26–34)
MCHC RBC AUTO-ENTMCNC: 33.4 G/DL (ref 31–36)
MCV RBC AUTO: 86.5 FL (ref 80–100)
MICROCYTES: ABNORMAL
MONOCYTES ABSOLUTE: 0.5 K/UL (ref 0–1.3)
MONOCYTES RELATIVE PERCENT: 8.2 %
NEUTROPHILS ABSOLUTE: 4.4 K/UL (ref 1.7–7.7)
NEUTROPHILS RELATIVE PERCENT: 69.6 %
PDW BLD-RTO: 23.2 % (ref 12.4–15.4)
PLATELET # BLD: 223 K/UL (ref 135–450)
PLATELET SLIDE REVIEW: ADEQUATE
PMV BLD AUTO: 8.3 FL (ref 5–10.5)
RBC # BLD: 4.7 M/UL (ref 4–5.2)
SLIDE REVIEW: ABNORMAL
WBC # BLD: 6.3 K/UL (ref 4–11)

## 2022-02-10 ENCOUNTER — ANESTHESIA (OUTPATIENT)
Dept: OPERATING ROOM | Age: 47
End: 2022-02-10
Payer: COMMERCIAL

## 2022-02-10 ENCOUNTER — HOSPITAL ENCOUNTER (OUTPATIENT)
Age: 47
Setting detail: OUTPATIENT SURGERY
Discharge: HOME | End: 2022-02-10
Attending: PODIATRIST | Admitting: PODIATRIST
Payer: COMMERCIAL

## 2022-02-10 VITALS
TEMPERATURE: 98.8 F | OXYGEN SATURATION: 100 % | RESPIRATION RATE: 20 BRPM | HEIGHT: 64 IN | BODY MASS INDEX: 27.66 KG/M2 | HEART RATE: 56 BPM | WEIGHT: 162 LBS | SYSTOLIC BLOOD PRESSURE: 123 MMHG | DIASTOLIC BLOOD PRESSURE: 82 MMHG

## 2022-02-10 VITALS
RESPIRATION RATE: 19 BRPM | SYSTOLIC BLOOD PRESSURE: 102 MMHG | DIASTOLIC BLOOD PRESSURE: 66 MMHG | OXYGEN SATURATION: 100 %

## 2022-02-10 DIAGNOSIS — M65.9 TENOSYNOVITIS OF LEFT ANKLE: Primary | ICD-10-CM

## 2022-02-10 PROBLEM — M65.972 TENOSYNOVITIS OF LEFT ANKLE: Status: ACTIVE | Noted: 2022-02-10

## 2022-02-10 LAB — PREGNANCY, URINE: NEGATIVE

## 2022-02-10 PROCEDURE — C1713 ANCHOR/SCREW BN/BN,TIS/BN: HCPCS | Performed by: PODIATRIST

## 2022-02-10 PROCEDURE — 7100000000 HC PACU RECOVERY - FIRST 15 MIN: Performed by: PODIATRIST

## 2022-02-10 PROCEDURE — 2500000003 HC RX 250 WO HCPCS: Performed by: ANESTHESIOLOGY

## 2022-02-10 PROCEDURE — 84703 CHORIONIC GONADOTROPIN ASSAY: CPT

## 2022-02-10 PROCEDURE — 6360000002 HC RX W HCPCS: Performed by: PODIATRIST

## 2022-02-10 PROCEDURE — 2709999900 HC NON-CHARGEABLE SUPPLY: Performed by: PODIATRIST

## 2022-02-10 PROCEDURE — 64445 NJX AA&/STRD SCIATIC NRV IMG: CPT | Performed by: ANESTHESIOLOGY

## 2022-02-10 PROCEDURE — 6360000002 HC RX W HCPCS: Performed by: ANESTHESIOLOGY

## 2022-02-10 PROCEDURE — 3700000000 HC ANESTHESIA ATTENDED CARE: Performed by: PODIATRIST

## 2022-02-10 PROCEDURE — 7100000001 HC PACU RECOVERY - ADDTL 15 MIN: Performed by: PODIATRIST

## 2022-02-10 PROCEDURE — 7100000010 HC PHASE II RECOVERY - FIRST 15 MIN: Performed by: PODIATRIST

## 2022-02-10 PROCEDURE — 3600000013 HC SURGERY LEVEL 3 ADDTL 15MIN: Performed by: PODIATRIST

## 2022-02-10 PROCEDURE — 88305 TISSUE EXAM BY PATHOLOGIST: CPT

## 2022-02-10 PROCEDURE — 3600000003 HC SURGERY LEVEL 3 BASE: Performed by: PODIATRIST

## 2022-02-10 PROCEDURE — 2580000003 HC RX 258: Performed by: ANESTHESIOLOGY

## 2022-02-10 PROCEDURE — 3700000001 HC ADD 15 MINUTES (ANESTHESIA): Performed by: PODIATRIST

## 2022-02-10 PROCEDURE — 7100000011 HC PHASE II RECOVERY - ADDTL 15 MIN: Performed by: PODIATRIST

## 2022-02-10 PROCEDURE — 64447 NJX AA&/STRD FEMORAL NRV IMG: CPT | Performed by: ANESTHESIOLOGY

## 2022-02-10 RX ORDER — SODIUM CHLORIDE 0.9 % (FLUSH) 0.9 %
10 SYRINGE (ML) INJECTION PRN
Status: DISCONTINUED | OUTPATIENT
Start: 2022-02-10 | End: 2022-02-10 | Stop reason: HOSPADM

## 2022-02-10 RX ORDER — PROMETHAZINE HYDROCHLORIDE 25 MG/ML
6.25 INJECTION, SOLUTION INTRAMUSCULAR; INTRAVENOUS
Status: DISCONTINUED | OUTPATIENT
Start: 2022-02-10 | End: 2022-02-10 | Stop reason: SDUPTHER

## 2022-02-10 RX ORDER — SODIUM CHLORIDE, SODIUM LACTATE, POTASSIUM CHLORIDE, CALCIUM CHLORIDE 600; 310; 30; 20 MG/100ML; MG/100ML; MG/100ML; MG/100ML
INJECTION, SOLUTION INTRAVENOUS CONTINUOUS PRN
Status: DISCONTINUED | OUTPATIENT
Start: 2022-02-10 | End: 2022-02-10 | Stop reason: SDUPTHER

## 2022-02-10 RX ORDER — ONDANSETRON 2 MG/ML
4 INJECTION INTRAMUSCULAR; INTRAVENOUS PRN
Status: DISCONTINUED | OUTPATIENT
Start: 2022-02-10 | End: 2022-02-10 | Stop reason: HOSPADM

## 2022-02-10 RX ORDER — LIDOCAINE HYDROCHLORIDE 10 MG/ML
1 INJECTION, SOLUTION EPIDURAL; INFILTRATION; INTRACAUDAL; PERINEURAL
Status: COMPLETED | OUTPATIENT
Start: 2022-02-10 | End: 2022-02-10

## 2022-02-10 RX ORDER — LABETALOL HYDROCHLORIDE 5 MG/ML
5 INJECTION, SOLUTION INTRAVENOUS EVERY 10 MIN PRN
Status: DISCONTINUED | OUTPATIENT
Start: 2022-02-10 | End: 2022-02-10 | Stop reason: HOSPADM

## 2022-02-10 RX ORDER — BUPIVACAINE HYDROCHLORIDE 5 MG/ML
INJECTION, SOLUTION EPIDURAL; INTRACAUDAL
Status: COMPLETED | OUTPATIENT
Start: 2022-02-10 | End: 2022-02-10

## 2022-02-10 RX ORDER — MIDAZOLAM HYDROCHLORIDE 1 MG/ML
INJECTION INTRAMUSCULAR; INTRAVENOUS
Status: COMPLETED
Start: 2022-02-10 | End: 2022-02-10

## 2022-02-10 RX ORDER — FENTANYL CITRATE 50 UG/ML
INJECTION, SOLUTION INTRAMUSCULAR; INTRAVENOUS PRN
Status: DISCONTINUED | OUTPATIENT
Start: 2022-02-10 | End: 2022-02-10 | Stop reason: SDUPTHER

## 2022-02-10 RX ORDER — ONDANSETRON 2 MG/ML
INJECTION INTRAMUSCULAR; INTRAVENOUS PRN
Status: DISCONTINUED | OUTPATIENT
Start: 2022-02-10 | End: 2022-02-10 | Stop reason: SDUPTHER

## 2022-02-10 RX ORDER — APREPITANT 40 MG/1
40 CAPSULE ORAL ONCE
Status: DISCONTINUED | OUTPATIENT
Start: 2022-02-10 | End: 2022-02-10 | Stop reason: HOSPADM

## 2022-02-10 RX ORDER — RIVAROXABAN 10 MG/1
10 TABLET, FILM COATED ORAL
Qty: 14 TABLET | Refills: 0 | Status: SHIPPED | OUTPATIENT
Start: 2022-02-10 | End: 2022-05-26 | Stop reason: ALTCHOICE

## 2022-02-10 RX ORDER — HYDRALAZINE HYDROCHLORIDE 20 MG/ML
5 INJECTION INTRAMUSCULAR; INTRAVENOUS EVERY 10 MIN PRN
Status: DISCONTINUED | OUTPATIENT
Start: 2022-02-10 | End: 2022-02-10 | Stop reason: HOSPADM

## 2022-02-10 RX ORDER — SODIUM CHLORIDE 0.9 % (FLUSH) 0.9 %
10 SYRINGE (ML) INJECTION EVERY 12 HOURS SCHEDULED
Status: DISCONTINUED | OUTPATIENT
Start: 2022-02-10 | End: 2022-02-10 | Stop reason: HOSPADM

## 2022-02-10 RX ORDER — MORPHINE SULFATE 2 MG/ML
2 INJECTION, SOLUTION INTRAMUSCULAR; INTRAVENOUS EVERY 5 MIN PRN
Status: DISCONTINUED | OUTPATIENT
Start: 2022-02-10 | End: 2022-02-10 | Stop reason: HOSPADM

## 2022-02-10 RX ORDER — OXYCODONE HYDROCHLORIDE AND ACETAMINOPHEN 5; 325 MG/1; MG/1
2 TABLET ORAL PRN
Status: DISCONTINUED | OUTPATIENT
Start: 2022-02-10 | End: 2022-02-10 | Stop reason: HOSPADM

## 2022-02-10 RX ORDER — MORPHINE SULFATE 2 MG/ML
1 INJECTION, SOLUTION INTRAMUSCULAR; INTRAVENOUS EVERY 5 MIN PRN
Status: DISCONTINUED | OUTPATIENT
Start: 2022-02-10 | End: 2022-02-10 | Stop reason: HOSPADM

## 2022-02-10 RX ORDER — CHLORHEXIDINE GLUCONATE 4 %
12 LIQUID (ML) TOPICAL
COMMUNITY

## 2022-02-10 RX ORDER — SODIUM CHLORIDE, SODIUM LACTATE, POTASSIUM CHLORIDE, CALCIUM CHLORIDE 600; 310; 30; 20 MG/100ML; MG/100ML; MG/100ML; MG/100ML
INJECTION, SOLUTION INTRAVENOUS CONTINUOUS
Status: DISCONTINUED | OUTPATIENT
Start: 2022-02-10 | End: 2022-02-10 | Stop reason: HOSPADM

## 2022-02-10 RX ORDER — DIPHENHYDRAMINE HYDROCHLORIDE 50 MG/ML
12.5 INJECTION INTRAMUSCULAR; INTRAVENOUS
Status: DISCONTINUED | OUTPATIENT
Start: 2022-02-10 | End: 2022-02-10 | Stop reason: HOSPADM

## 2022-02-10 RX ORDER — OXYCODONE HYDROCHLORIDE AND ACETAMINOPHEN 5; 325 MG/1; MG/1
1 TABLET ORAL PRN
Status: DISCONTINUED | OUTPATIENT
Start: 2022-02-10 | End: 2022-02-10 | Stop reason: HOSPADM

## 2022-02-10 RX ORDER — BUPIVACAINE HYDROCHLORIDE 5 MG/ML
INJECTION, SOLUTION EPIDURAL; INTRACAUDAL
Status: COMPLETED
Start: 2022-02-10 | End: 2022-02-10

## 2022-02-10 RX ORDER — PROPOFOL 10 MG/ML
INJECTION, EMULSION INTRAVENOUS PRN
Status: DISCONTINUED | OUTPATIENT
Start: 2022-02-10 | End: 2022-02-10 | Stop reason: SDUPTHER

## 2022-02-10 RX ORDER — SODIUM CHLORIDE 9 MG/ML
25 INJECTION, SOLUTION INTRAVENOUS PRN
Status: DISCONTINUED | OUTPATIENT
Start: 2022-02-10 | End: 2022-02-10 | Stop reason: HOSPADM

## 2022-02-10 RX ORDER — MIDAZOLAM HYDROCHLORIDE 1 MG/ML
INJECTION INTRAMUSCULAR; INTRAVENOUS PRN
Status: DISCONTINUED | OUTPATIENT
Start: 2022-02-10 | End: 2022-02-10 | Stop reason: SDUPTHER

## 2022-02-10 RX ORDER — TRAMADOL HYDROCHLORIDE 50 MG/1
50 TABLET ORAL EVERY 6 HOURS PRN
Qty: 28 TABLET | Refills: 0 | Status: SHIPPED | OUTPATIENT
Start: 2022-02-10 | End: 2022-02-17

## 2022-02-10 RX ADMIN — Medication 2000 MG: at 09:10

## 2022-02-10 RX ADMIN — LIDOCAINE HYDROCHLORIDE 0.1 ML: 10 INJECTION, SOLUTION EPIDURAL; INFILTRATION; INTRACAUDAL; PERINEURAL at 07:58

## 2022-02-10 RX ADMIN — BUPIVACAINE HYDROCHLORIDE 10 ML: 5 INJECTION, SOLUTION EPIDURAL; INTRACAUDAL at 09:00

## 2022-02-10 RX ADMIN — FENTANYL CITRATE 50 MCG: 50 INJECTION INTRAMUSCULAR; INTRAVENOUS at 09:07

## 2022-02-10 RX ADMIN — PROPOFOL 160 MG: 10 INJECTION, EMULSION INTRAVENOUS at 09:07

## 2022-02-10 RX ADMIN — BUPIVACAINE HYDROCHLORIDE 20 ML: 5 INJECTION, SOLUTION EPIDURAL; INTRACAUDAL; PERINEURAL at 09:00

## 2022-02-10 RX ADMIN — SODIUM CHLORIDE, POTASSIUM CHLORIDE, SODIUM LACTATE AND CALCIUM CHLORIDE: 600; 310; 30; 20 INJECTION, SOLUTION INTRAVENOUS at 09:03

## 2022-02-10 RX ADMIN — SODIUM CHLORIDE, POTASSIUM CHLORIDE, SODIUM LACTATE AND CALCIUM CHLORIDE: 600; 310; 30; 20 INJECTION, SOLUTION INTRAVENOUS at 07:59

## 2022-02-10 RX ADMIN — MIDAZOLAM 2 MG: 1 INJECTION INTRAMUSCULAR; INTRAVENOUS at 08:46

## 2022-02-10 RX ADMIN — ONDANSETRON HYDROCHLORIDE 4 MG: 2 INJECTION, SOLUTION INTRAMUSCULAR; INTRAVENOUS at 10:07

## 2022-02-10 ASSESSMENT — PULMONARY FUNCTION TESTS
PIF_VALUE: 3
PIF_VALUE: 2
PIF_VALUE: 3
PIF_VALUE: 2
PIF_VALUE: 2
PIF_VALUE: 3
PIF_VALUE: 2
PIF_VALUE: 3
PIF_VALUE: 2
PIF_VALUE: 0
PIF_VALUE: 3
PIF_VALUE: 17
PIF_VALUE: 3
PIF_VALUE: 3
PIF_VALUE: 4
PIF_VALUE: 1
PIF_VALUE: 1
PIF_VALUE: 2
PIF_VALUE: 2
PIF_VALUE: 3
PIF_VALUE: 2
PIF_VALUE: 3
PIF_VALUE: 2
PIF_VALUE: 2
PIF_VALUE: 3
PIF_VALUE: 4
PIF_VALUE: 2
PIF_VALUE: 2
PIF_VALUE: 3
PIF_VALUE: 2
PIF_VALUE: 2
PIF_VALUE: 3
PIF_VALUE: 2
PIF_VALUE: 3
PIF_VALUE: 2
PIF_VALUE: 1
PIF_VALUE: 3
PIF_VALUE: 0
PIF_VALUE: 3
PIF_VALUE: 4
PIF_VALUE: 1
PIF_VALUE: 3
PIF_VALUE: 11
PIF_VALUE: 0
PIF_VALUE: 3
PIF_VALUE: 2
PIF_VALUE: 3
PIF_VALUE: 2
PIF_VALUE: 3
PIF_VALUE: 2
PIF_VALUE: 3

## 2022-02-10 ASSESSMENT — PAIN DESCRIPTION - DESCRIPTORS: DESCRIPTORS: ACHING;THROBBING;SHARP;SHOOTING

## 2022-02-10 ASSESSMENT — PAIN - FUNCTIONAL ASSESSMENT
PAIN_FUNCTIONAL_ASSESSMENT: PREVENTS OR INTERFERES WITH MANY ACTIVE NOT PASSIVE ACTIVITIES
PAIN_FUNCTIONAL_ASSESSMENT: 0-10

## 2022-02-10 NOTE — ANESTHESIA PROCEDURE NOTES
Peripheral Block    Patient location during procedure: PACU  Start time: 2/10/2022 8:52 AM  End time: 2/10/2022 8:57 AM  Staffing  Anesthesiologist: Sin Sesay MD  Preanesthetic Checklist  Completed: patient identified, IV checked, site marked, risks and benefits discussed, surgical consent, monitors and equipment checked, pre-op evaluation, timeout performed, anesthesia consent given, oxygen available and patient being monitored  Peripheral Block  Patient position: supine  Prep: ChloraPrep  Patient monitoring: cardiac monitor, continuous pulse ox, frequent blood pressure checks and IV access  Block type: Femoral  Laterality: left  Injection technique: single-shot  Guidance: ultrasound guided  Local infiltration: lidocaine  Infiltration strength: 1 %  Adductor canal  Provider prep: mask and sterile gloves  Local infiltration: lidocaine  Needle  Needle type: combined needle/nerve stimulator   Needle gauge: 22 G  Needle length: 5 cm  Needle localization: ultrasound guidance  Assessment  Injection assessment: negative aspiration for heme, no paresthesia on injection and local visualized surrounding nerve on ultrasound  Slow fractionated injection: yes  Hemodynamics: stable  Additional Notes  Sartorius and Vastus Medialis Muscle, Femoral artery and Saphenous nerve are identified; the tip of the needle and the spread of the local anesthetic around the Saphenous nerve are visualized. The Saphenous nerve appeared to be anatomically normal and there were no abnormal pathologically findings seen.    Medications Administered  Bupivacaine (MARCAINE) PF injection 0.5%, 10 mL  Reason for block: post-op pain management and at surgeon's request

## 2022-02-10 NOTE — PROGRESS NOTES
Discharge  instructions reviewed. Pt and family verbalize understanding with no further questions. VSS. Pt discharged via [facility] to car. Assessment unchanged.

## 2022-02-10 NOTE — OP NOTE
Operative Note    Patient: Juni Murcia  YOB: 1975  MRN: [id number]    Date of Procedure: 2/10/2022    Pre-Op Diagnosis: POSTERIOR TIBIAL TENDON AND FLEXOR DIGITORUM LONGUS TENOSYNOVITIS LEFT, POSTERIOR TIBIAL TENDON DYSFUNCTION LEFT, PAIN LEFT FOOT, PLANTAR FASCIITIS LEFT FOOT    Post-Op Diagnosis: Same       Procedure(s):  TENOSYNOVECTOMY POSTERIOR TIBIAL TENDON, PLANTAR ENDOSCOPIC FASCIOTOMY LEFT FOOT, APPLICATION POSTERIOR SPLINT LEFT FOOT    Surgeon(s):  Maura Cyr DPM    Assistant:  Resident: Aaliyah Roper DPM    Anesthesia: General with popliteal nerve block left lower extremity    Injectables: None    Hemostasis: Left pneumatic thigh tourniquet, 325 mmHg- - 54 minutes    Materials: 2-0 Vicryl, 3-0 Vicryl, 4-0 Vicryl, 4-0 Nylon    Estimated Blood Loss: Minimal    Complications: None    Specimens:   ID Type Source Tests Collected by Time Destination   A : synovitis posterior tibial tendon left foot Tissue Tissue [facility]Y [facility] 2/10/2022 8342        Implants:  Implant Name Type Inv. Item Serial No.  Lot No. LRB No. Used Action   [id number] - [id number]   [id number]   Left 1 Implanted         Drains: * No LDAs found *    Findings: See op report    INDICATIONS FOR PROCEDURE: This is a private patient of Dr. Willie Lorenzo. This patient has signs and symptoms clinically  consistent with the above mentioned preoperative diagnosis. Having failed conservative treatment, it was determined that the patient would benefit from surgical intervention. All potential risks, benefits, and complications were discussed with the patient prior to the scheduling of surgery. All the patient's questions were answered and no guarantees were given. The patient wished to proceed with surgery, and informed written consent was obtained.      Detail of Procedure: Prior to the patient coming into the operating area the patient received a popliteal nerve block to the left lower extremity. Next, the patient was brought from the preoperative area and placed on the operating table in the supine position. Following induction of general anesthesia patient received 2 g of IV Ancef. Next, we applied a well padded pneumatic thigh tourniquet to the patients left lower extremity. The patients left lower extremity was then scrubbed, prepped and draped in the usual sterile manner. A time-out was performed. The patient, procedure and operative site were confirmed and the following procedure was then performed. The left lower extremity was exsanguinated using an esmark bandage and the left pneumatic thigh tourniquet was inflated to 250 mmHg. Detail of procedure #1 endoscopic plantar fasciotomy left foot: Attention was directed to the medial aspect of the left foot, where a #15 blade was used to make a 1 cm vertical incision in the medial aspect of the foot at the medial calcaneal tubercle, approximately 6 cm distal to the posterior aspect of the calcaneus, and approximately 2 cm superior to the weight-bearing surface. Dissection was then continued bluntly through the subcutaneous tissue. A curved Arlen hemostat was then used to bluntly dissect the remaining subcutaneous and deep tissues down to the level of the plantar fascia. A fascial elevator from the EPF instrumentation tray was then inserted into the incision and slid under the plantar fascia. The fascial elevator then was passed from medial to lateral in a horizontal manner across the plantar aspect of the plantar fascia until tenting of the lateral skin was observed. The elevator was then removed and a trocar with an obturator/cannula was then inserted from medial to lateral along the same path. A #15 blade was then used to make a vertical incision one centimeter in length on the lateral aspect of the left foot to allow for the trocar and obturator/cannula to pass through the skin.   The trocar was then removed and the obturator/cannula was cleaned with sterile cotton-tipped applicators. A 4 mm 30 degree Stand Ins endoscope was then inserted into the obturator laterally. The obturator was then turned 360 degrees so as to visualize and confirm proper placement of the device, being plantar to all fibers of the fascial band. Proper placement was confirmed. A blunt probe was then placed medial to lateral through the obturator to an extent approximately 50-60% of the width of the plantar fascia. A hooked blade from the EPF instrumentation set was then passed through the cannula, and while visualizing the fascia on the endoscopy monitor, approximately 50-60% of the plantar fascia was transected in a lateral to medial fashion. Following transection of the fascia, the muscle belly of the flexor digitorum brevis was readily visualized, thereby ensuring complete transection of the plantar fascia in this region. The blunt probe was again passed through the obturator/cannula to check for any residual medial plantar fascial fibers, and none were found. The incision was then vigorously lavaged with normal saline through the obturator/cannula. The obturator/cannula was then removed. The skin incisions were reapproximated using 4-0 nylon in a horizontal mattress suture technique. Detail of procedure #2 Posterior tibial tendon tenosynovectomy left foot: Attention was directed to the medial aspect of the left foot where an approximately 5 cm curvilinear incision was placed over the infra malleoli where aspect of the medial malleolus using a #15 blade down through subcutaneous tissue. Great care was taken to identify and retract all neurological structures. All venous tributaries electrocauterized as deemed necessary. Next, the incision was deepened down using combination of sharp and blunt dissection to the extensor retinaculum.   Next, using a #15 blade meticulous care was made to move the retinaculum medial and laterally thereby exposing the posterior tibial tendon sheath. The tendon sheath was sharply dissected medial lateral using a #15 blade and extended proximally to the inferior malleolus and distally to its insertion at the navicular bone. Next after further inspection noticed diffuse synovitis extending approximately 3 cm from the insertion point of the tendon. Next using a combination of sharp and blunt dissection all synovitis was removed from the operating field in total and placed to the back table to be sent off to pathology for further culture and sensitivities. Next, after inspecting the posterior tibial tendon as well as the flexor digitorum longus tendon at no interstitial tears were noted. Both posterior tibial tendon and FDL were tubularized and glistening indicative of healthy tendon. Next, after further inspection and no further synovitis noted the surgical site was irrigated with cups amounts normal sterile saline. Attention was then redirected towards the posterior tibial tendon on the left foot. Using Arthrex Amnion Matrix cord 3 x 4 cm that was trimmed to wrap around the posterior tibial tendon like a burrito. The allograft was placed over the posterior tibial tendon to help augment the healing process and reduce the risk of adhesion formation post-operatively. Special care was taken to make sure that the allograft remained flat with underlying anatomical surface. Next, the allograft to secure it in place using 2-0 Vicryl suture. Next, the posterior tibial tendon sheath was reapproximated using 3-0 Vicryl in a running interlocking suture technique. Next, the extensor retinaculum sheath was reapproximated using 3-0 Vicryl and a running interlocking suture technique. Next, the subcutaneous tissue was reapproximated using 4-0 Vicryl in a buried suture technique.   Next, the skin edges were reapproximated using a combination of horizontal mattress and simple interrupted suture technique using 4-0 kerry. Detail of procedure #3 Application of posterior splint left lower extremity: At this time, a local anesthetic was injected in a regional block fashion about the patients surgical sites for the patients postoperative comfort and soft dry sterile dressing was applied. The pneumatic thigh tourniquet was rapidly deflated, after a total time of 54 minutes, and a prompt instantaneous hyperemic response was noted on all aspects of the patients left lower extremity. Next, copious amounts of cast padding was applied to the patient's left lower extremity from the metatarsal heads to approximately 3 finger breaths distal to the head and neck of the fibula. Next, using moistened padded splint material, a posterior splint was applied from the plantar foot posteriorly up the leg to approximately the midcalf area. ACE compression was then applied from distal to proximal to secure the posterior splint in place and provide compression to prevent post-operative edema. At this time, the foot was then dorsiflexed to 90 degrees to prevent acquired equinus deformity and relieve tension on the surgical repair. End of Procedure: The patient tolerated the procedure and anesthesia well. The patient left the OR with vital signs stable and vascular status intact to all remaining digits of the  left foot. Following a period of post-operative monitoring, the patient will be discharged home with written and oral wound care and follow-up instructions per Dr. Rodo Odonnell. The patient is to follow-up with Dr. Rodo Odonnell in her private office within 5-7 days. The patient is to keep dressing clean, dry and intact at all times. The patient is to call with if any complications occur. All counts were correct and attending was present scrubbed in throughout entire case. Patient was given a prescription of tramadol for pain control and Xarelto blood thinner.   Patient is strict nonweightbearing to the left lower extremity and can ambulate with the aid of crutches/walker or knee scooter as tolerated.        Dictated on behalf of Dr. Ilya Smith DPM  Please call office for any questions or concerns    Elodia Cantor DPM   Podiatric Resident, PGY-3    Electronically signed by Elodia Cantor DPM on 2/10/2022 at 10:23 AM

## 2022-02-10 NOTE — BRIEF OP NOTE
Brief Postoperative Note      Patient: Jake Dillon  YOB: 1975  MRN: [id number]    Date of Procedure: 2/10/2022    Pre-Op Diagnosis: POSTERIOR TIBIAL TENDON AND FLEXOR DIGITORUM LONGUS TENOSYNOVITIS LEFT, POSTERIOR TIBIAL TENDON DYSFUNCTION LEFT, PAIN LEFT FOOT, PLANTAR FASCIITIS LEFT FOOT    Post-Op Diagnosis: Same       Procedure(s):  TENOSYNOVECTOMY POSTERIOR TIBIAL TENDON  , PLANTAR ENDOSCOPIC FASCIOTOMY LEFT FOOT, APPLICATION POSTERIOR SPLINT LEFT FOOT    Surgeon(s):  Maura Medina DPM    Assistant:  Resident: Favio Leblanc DPM    Anesthesia: General with popliteal nerve block left lower extremity    Injectables: None    Hemostasis: Left pneumatic thigh tourniquet, 325 mmHg- - 54 minutes    Materials: 2-0 Vicryl, 3-0 Vicryl, 4-0 Vicryl, 4-0 Nylon, Arthrex Amnion Cord Graft    Estimated Blood Loss: Minimal    Complications: None    Specimens:   ID Type Source Tests Collected by Time Destination   A : synovitis posterior tibial tendon left foot Tissue Tissue SURGICAL PATHOLOGY [facility] 2/10/2022 9351        Implants:  Implant Name Type Inv.  Item Serial No.  Lot No. LRB No. Used Action   [id number] - [id number]   [id number]   Left 1 Implanted         Drains: * No LDAs found *    Findings: See op report    Electronically signed by Helene Schroeder DPM on 2/10/2022 at 10:20 AM

## 2022-02-10 NOTE — ANESTHESIA PROCEDURE NOTES
Peripheral Block    Patient location during procedure: pre-op  Start time: 2/10/2022 8:47 AM  End time: 2/10/2022 8:52 AM  Staffing  Performed: anesthesiologist   Anesthesiologist: Erlinda Daniels MD  Preanesthetic Checklist  Completed: patient identified, IV checked, site marked, risks and benefits discussed, surgical consent, monitors and equipment checked, pre-op evaluation, timeout performed, anesthesia consent given, oxygen available and patient being monitored  Peripheral Block  Prep: ChloraPrep  Patient monitoring: cardiac monitor, continuous pulse ox, frequent blood pressure checks and IV access  Block type: Sciatic  Laterality: left  Injection technique: single-shot  Guidance: ultrasound guided  Local infiltration: lidocaine  Infiltration strength: 1 %  Dose: 3 mL  Popliteal  Provider prep: mask and sterile gloves  Local infiltration: lidocaine  Needle  Needle gauge: 21 G  Needle length: 10 cm  Needle localization: ultrasound guidance  Assessment  Injection assessment: negative aspiration for heme, no paresthesia on injection and local visualized surrounding nerve on ultrasound  Paresthesia pain: none  Slow fractionated injection: yes  Hemodynamics: stable  Additional Notes  Immediately prior to procedure a \"time out\" was called to verify the correct patient, allergies, laterality, procedure and equipment. Time out performed with Gayatri Stade, RN    Biceps Femoris muscle (long head), Vastus lateralis muscle, Sciatic nerve (Tibia and Common Peroneal Nerves) and Popliteal artery are identified; the tip of the needle and the spread of the local anesthetic around the Tibial and Common Peroneal Nerve are visualized. The Sciatic Nerve (Tibia and Common Peroneal Nerve) appeared to be anatomically normal and there were no abnormal pathologically findings seen.          Medications Administered  Bupivacaine (MARCAINE) PF injection 0.5%, 20 mL  Reason for block: post-op pain management and at surgeon's request

## 2022-02-10 NOTE — ANESTHESIA PRE PROCEDURE
Department of Anesthesiology  Preprocedure Note       Name:  Judith Diaz   Age:  52 y.o.  :  1975                                          MRN:  [id number]         Date:  2/10/2022      Surgeon: Meme Garcia):  Maura Castro DPM    Procedure: Procedure(s):  TENOSYNOVECTOMY POSTERIOR TIBIAL TENDON AND POSSIBLY FLEXOR DIGITORUM LONGUS TENDON LEFT FOOT, POSSIBLE REPAIR OF POSTERIOR TIBIAL TENDON AND FLEXOR DIGITORUM LONGUS LEFT FOOT, PLANTAR ENDOSCOPIC FASCIOTOMY LEFT FOOT, APPLICATION POSTERIOR SPLINT LEFT FOOT    Medications prior to admission:   Prior to Admission medications    Medication Sig Start Date End Date Taking?  Authorizing Provider   Melatonin 12 MG TABS Take 12 mg by mouth nightly as needed   Yes Historical Provider, MD   Cholecalciferol (VITAMIN D3) 50 MCG (2000 UT) CAPS Take by mouth   Yes Historical Provider, MD   folic acid (FOLVITE) 629 MCG tablet Take 400 mcg by mouth daily   Yes Historical Provider, MD   levothyroxine (SYNTHROID) 88 MCG tablet Take 1 tablet by mouth every morning (before breakfast) 21  Yes Jeffery Waters DO   rizatriptan (MAXALT-MLT) 10 MG disintegrating tablet Take 1 tablet by mouth once as needed for Migraine May repeat in 2 hours if needed 11/23/21 2/10/22 Yes Jeffery Waters DO   fluticasone (FLONASE) 50 MCG/ACT nasal spray 1 spray by Each Nostril route daily   Yes Historical Provider, MD   ondansetron (ZOFRAN ODT) 4 MG disintegrating tablet Take 1 tablet by mouth every 8 hours as needed for Nausea or Vomiting 21   Jeffery Waters DO   albuterol sulfate HFA (VENTOLIN HFA) 108 (90 Base) MCG/ACT inhaler Inhale 2 puffs into the lungs every 6 hours as needed for Wheezing    Historical Provider, MD       Current medications:    Current Facility-Administered Medications   Medication Dose Route Frequency Provider Last Rate Last Admin    ceFAZolin (ANCEF) 2000 mg in sterile water 20 mL IV syringe  2,000 mg IntraVENous Once Maura Castro MANUEL        lactated ringers infusion   IntraVENous Continuous Gilbert Lockhart  mL/hr at 02/10/22 0759 New Bag at 02/10/22 0759    sodium chloride flush 0.9 % injection 10 mL  10 mL IntraVENous 2 times per day Gilbert Lockhart MD        sodium chloride flush 0.9 % injection 10 mL  10 mL IntraVENous PRN Gilbert Lockhart MD        0.9 % sodium chloride infusion  25 mL IntraVENous PRN Gilbert Lockhart MD        aprepitant (EMEND) capsule 40 mg  40 mg Oral Once Gilbert Lockhart MD        bupivacaine (PF) (MARCAINE) 0.5 % injection             midazolam (VERSED) 2 MG/2ML injection                Allergies:  No Known Allergies    Problem List:    Patient Active Problem List   Diagnosis Code    Active labor [id number]    Normal vaginal delivery O80    Headache R51.9    Labor and delivery indication for care or intervention O75.9    Migraine G43.909    Hip impingement syndrome M25.859    Right ankle instability M25.371       Past Medical History:        Diagnosis Date    Anemia     iron supplement    Headache(784.0)     Herpes simplex without mention of complication     last outbreak 2 months ago      Hypothyroidism        Past Surgical History:        Procedure Laterality Date    ACHILLES TENDON SURGERY Right 9/22/2020    MODIFIED BROSTROM PROCEDURE RIGHT ANKLE, TENOSYNOVECTOMY  POSTERIOR TIBIAL TENDON RIGHT, APPLICATION POSTERIOR  SPLINT RIGHT performed by Michael Murdock DPM at [place] [address], [facility]  2007    DILATION AND CURETTAGE OF UTERUS  2009    HERNIA REPAIR      KNEE SURGERY  2005    rt knee surgery       Social History:    Social History     Tobacco Use    Smoking status: Never Smoker    Smokeless tobacco: Never Used   Substance Use Topics    Alcohol use:  Yes     Alcohol/week: 8.3 standard drinks     Types: 10 Standard drinks or equivalent per week                                Counseling given: Not Answered      Vital Signs (Current):   Vitals: 02/12/2013    LABRH Negative 02/12/2013       Drug/Infectious Status (If Applicable):  No results found for: HIV, HEPCAB    COVID-19 Screening (If Applicable):   Lab Results   Component Value Date    COVID19 Not Detected 02/07/2022    COVID19 NOT DETECTED 09/16/2020           Anesthesia Evaluation  Patient summary reviewed and Nursing notes reviewed  Airway: Mallampati: I  TM distance: >3 FB   Neck ROM: full  Mouth opening: > = 3 FB Dental: normal exam         Pulmonary:Negative Pulmonary ROS and normal exam  breath sounds clear to auscultation                             Cardiovascular:Negative CV ROS            Rhythm: regular  Rate: normal                    Neuro/Psych:   (+) headaches: migraine headaches,             GI/Hepatic/Renal: Neg GI/Hepatic/Renal ROS            Endo/Other:    (+) hypothyroidism::., .                 Abdominal:             Vascular: negative vascular ROS. Other Findings:             Anesthesia Plan      general and regional     ASA 2     (Blocks)  Induction: intravenous. MIPS: Postoperative opioids intended and Prophylactic antiemetics administered. Anesthetic plan and risks discussed with patient.                       Mikel Meadows MD   2/10/2022

## 2022-02-11 NOTE — ANESTHESIA POSTPROCEDURE EVALUATION
Department of Anesthesiology  Postprocedure Note    Patient: Angel Gipson  MRN: [id number]  YOB: 1975  Date of evaluation: 2/11/2022  Time:  8:03 AM     Procedure Summary     Date: 02/10/22 Room / Location: [facility] EG OR 02 / [facility]    Anesthesia Start: 0471 Anesthesia Stop: 1022    Procedure: TENOSYNOVECTOMY POSTERIOR TIBIAL TENDON  , PLANTAR ENDOSCOPIC FASCIOTOMY LEFT FOOT, APPLICATION POSTERIOR SPLINT LEFT FOOT (Left Foot) Diagnosis: (POSTERIOR TIBIAL TENDON AND FLEXOR DIGITORUM LONGUS TENOSYNOVITIS LEFT, POSTERIOR TIBIAL TENDON DYSFUNCTION LEFT, PAIN LEFT FOOT, PLANTAR FASCIITIS LEFT FOOT)    Surgeons: Kimmie Herr DPM Responsible Provider: Joann Yun MD    Anesthesia Type: general, regional ASA Status: 2          Anesthesia Type: general, regional    Simeon Phase I: Simeon Score: 9    Siemon Phase II: Simeon Score: 10    Last vitals: Reviewed and per EMR flowsheets.        Anesthesia Post Evaluation    Patient location during evaluation: PACU  Patient participation: complete - patient participated  Level of consciousness: awake and alert  Pain score: 0  Airway patency: patent  Nausea & Vomiting: no nausea and no vomiting  Complications: no  Cardiovascular status: blood pressure returned to baseline  Respiratory status: acceptable  Hydration status: stable

## 2022-05-25 DIAGNOSIS — E03.9 ACQUIRED HYPOTHYROIDISM: ICD-10-CM

## 2022-05-25 NOTE — TELEPHONE ENCOUNTER
.  Refill Request     CONFIRM preferrred pharmacy with the patient. If Mail Order Rx - Pend for 90 day refill.       Last Seen: Last Seen Department: 2/8/2022  Last Seen by PCP: 11/23/2021    Last Written: 11-24-21 90 with 1     Next Appointment:   Future Appointments   Date Time Provider Ivy Vasquez   5/26/2022 11:00 AM DO VEL[facility] [place] [facility]       Future appointment scheduled      Requested Prescriptions     Pending Prescriptions Disp Refills    levothyroxine (SYNTHROID) 88 MCG tablet [Pharmacy Med Name: LEVOTHYROXINE SODIUM 88MCG TABS] 90 tablet 1     Sig: TAKE ONE TABLET BY MOUTH EVERY MORNING BEFORE BREAKFAST

## 2022-05-26 ENCOUNTER — OFFICE VISIT (OUTPATIENT)
Dept: FAMILY MEDICINE CLINIC | Age: 47
End: 2022-05-26
Payer: COMMERCIAL

## 2022-05-26 VITALS
BODY MASS INDEX: 27.81 KG/M2 | SYSTOLIC BLOOD PRESSURE: 116 MMHG | WEIGHT: 162 LBS | OXYGEN SATURATION: 98 % | HEART RATE: 75 BPM | DIASTOLIC BLOOD PRESSURE: 70 MMHG

## 2022-05-26 DIAGNOSIS — Z00.00 PREVENTATIVE HEALTH CARE: Primary | ICD-10-CM

## 2022-05-26 DIAGNOSIS — E78.2 MIXED HYPERLIPIDEMIA: ICD-10-CM

## 2022-05-26 DIAGNOSIS — D50.9 IRON DEFICIENCY ANEMIA, UNSPECIFIED IRON DEFICIENCY ANEMIA TYPE: ICD-10-CM

## 2022-05-26 DIAGNOSIS — G43.109 MIGRAINE WITH AURA AND WITHOUT STATUS MIGRAINOSUS, NOT INTRACTABLE: ICD-10-CM

## 2022-05-26 DIAGNOSIS — E03.9 ACQUIRED HYPOTHYROIDISM: ICD-10-CM

## 2022-05-26 DIAGNOSIS — E55.9 VITAMIN D DEFICIENCY: ICD-10-CM

## 2022-05-26 PROCEDURE — 99396 PREV VISIT EST AGE 40-64: CPT | Performed by: FAMILY MEDICINE

## 2022-05-26 RX ORDER — LEVOTHYROXINE SODIUM 88 UG/1
TABLET ORAL
Qty: 90 TABLET | Refills: 1 | Status: SHIPPED | OUTPATIENT
Start: 2022-05-26 | End: 2022-08-25 | Stop reason: SDUPTHER

## 2022-05-26 RX ORDER — ONDANSETRON 4 MG/1
4 TABLET, ORALLY DISINTEGRATING ORAL EVERY 8 HOURS PRN
Qty: 30 TABLET | Refills: 5 | Status: SHIPPED | OUTPATIENT
Start: 2022-05-26

## 2022-05-26 ASSESSMENT — PATIENT HEALTH QUESTIONNAIRE - PHQ9
SUM OF ALL RESPONSES TO PHQ9 QUESTIONS 1 & 2: 0
SUM OF ALL RESPONSES TO PHQ QUESTIONS 1-9: 0
1. LITTLE INTEREST OR PLEASURE IN DOING THINGS: 0
SUM OF ALL RESPONSES TO PHQ QUESTIONS 1-9: 0
SUM OF ALL RESPONSES TO PHQ QUESTIONS 1-9: 0
2. FEELING DOWN, DEPRESSED OR HOPELESS: 0
SUM OF ALL RESPONSES TO PHQ QUESTIONS 1-9: 0

## 2022-05-26 NOTE — PROGRESS NOTES
Ignacio Travis is a 52 y.o. female    Chief Complaint   Patient presents with    6 Month Follow-Up    Migraine     Around her menstrual cycle- Zofran helps with Cuca        HPI:    HPI    Preventative care. Tdap: UTD  Cologuard: UTD    Pap smears: she is unsure if she is due    Migraine   This is a chronic problem. The current episode started more than 1 year ago. The problem occurs monthly. The problem has been waxing and waning. The pain is located in the right unilateral region. Associated symptoms include nausea, phonophobia and photophobia. The symptoms are aggravated by menstrual cycle. She has tried triptans for the symptoms. The treatment provided significant relief. Her past medical history is significant for migraine headaches.      Acquired hypothyroidism. This is a chronic issue. The patient takes her medicine in the morning on an empty stomach. Her recent TSH was not within normal limits.     Iron deficiency anemia. This is a chronic issue. The patient was found to be iron deficient in the past but could not tolerate iron supplementation. She does have heavy periods. Works as a respiratory therapist.     The 10-year ASCVD risk score (Tess Hoffmann, et al., 2013) is: 0.8%    Values used to calculate the score:      Age: 52 years      Sex: Female      Is Non- : No      Diabetic: No      Tobacco smoker: No      Systolic Blood Pressure: 351 mmHg      Is BP treated: No      HDL Cholesterol: 67 mg/dL      Total Cholesterol: 242 mg/dL      ROS:    Review of Systems    /70   Pulse 75   Wt 162 lb (73.5 kg)   SpO2 98%   BMI 27.81 kg/m²     Physical Exam:    Physical Exam  Constitutional:       General: She is not in acute distress. Appearance: She is well-developed. She is not toxic-appearing. HENT:      Head: Normocephalic. Cardiovascular:      Rate and Rhythm: Normal rate and regular rhythm. Pulses: Normal pulses.       Heart sounds: No murmur heard.      Pulmonary:      Effort: Pulmonary effort is normal. No respiratory distress. Breath sounds: Normal breath sounds. No wheezing. Musculoskeletal:      Cervical back: Normal range of motion. No rigidity. Lymphadenopathy:      Cervical: No cervical adenopathy. Neurological:      Mental Status: She is alert. Psychiatric:         Mood and Affect: Mood normal.         Behavior: Behavior normal.         Thought Content: Thought content normal.         Current Outpatient Medications   Medication Sig Dispense Refill    ondansetron (ZOFRAN ODT) 4 MG disintegrating tablet Take 1 tablet by mouth every 8 hours as needed for Nausea or Vomiting 30 tablet 5    levothyroxine (SYNTHROID) 88 MCG tablet TAKE ONE TABLET BY MOUTH EVERY MORNING BEFORE BREAKFAST 90 tablet 1    Melatonin 12 MG TABS Take 12 mg by mouth nightly as needed      Cholecalciferol (VITAMIN D3) 50 MCG (2000 UT) CAPS Take by mouth      folic acid (FOLVITE) 549 MCG tablet Take 400 mcg by mouth daily      rizatriptan (MAXALT-MLT) 10 MG disintegrating tablet Take 1 tablet by mouth once as needed for Migraine May repeat in 2 hours if needed 30 tablet 5    albuterol sulfate HFA (VENTOLIN HFA) 108 (90 Base) MCG/ACT inhaler Inhale 2 puffs into the lungs every 6 hours as needed for Wheezing      fluticasone (FLONASE) 50 MCG/ACT nasal spray 1 spray by Each Nostril route daily       No current facility-administered medications for this visit. Assessment:    1. Preventative health care    2. Acquired hypothyroidism    3. Migraine with aura and without status migrainosus, not intractable    4. Mixed hyperlipidemia    5. Iron deficiency anemia, unspecified iron deficiency anemia type    6. Vitamin D deficiency        Plan:    1. Preventative health care    2. Acquired hypothyroidism  Stable. Continue current medications.   - TSH with Reflex; Future    3. Migraine with aura and without status migrainosus, not intractable  Waxing and waning. Discussed improving sleep and taking magnesium 400 mg at night.  - ondansetron (ZOFRAN ODT) 4 MG disintegrating tablet; Take 1 tablet by mouth every 8 hours as needed for Nausea or Vomiting  Dispense: 30 tablet; Refill: 5    4. Mixed hyperlipidemia  Cholesterol is elevated but you do not need a medicine at this point. Continue to eat a diet low in saturated fat and cholesterol.    - CBC with Auto Differential; Future  - Comprehensive Metabolic Panel; Future  - Lipid Panel; Future  - Vitamin B12 & Folate; Future    5. Iron deficiency anemia, unspecified iron deficiency anemia type  Continue follow-up with hematology. 6. Vitamin D deficiency  - Vitamin D 25 Hydroxy; Future      Return in about 6 months (around 11/26/2022) for Hypothyroidism, Hyperlipidemia, migraine.

## 2022-07-01 ENCOUNTER — HOSPITAL ENCOUNTER (OUTPATIENT)
Dept: PHYSICAL THERAPY | Age: 47
Setting detail: THERAPIES SERIES
Discharge: HOME | End: 2022-07-01
Payer: COMMERCIAL

## 2022-07-01 PROCEDURE — 97140 MANUAL THERAPY 1/> REGIONS: CPT

## 2022-07-01 PROCEDURE — 97161 PT EVAL LOW COMPLEX 20 MIN: CPT

## 2022-07-01 NOTE — PROGRESS NOTES
Johnna  [facility], [facility], [address], [address]  Phone: [phone]  Fax [phone]    Dear Referring Practitioner: Dr. Igor Mireles,     We had the pleasure of evaluating the following patient for physical therapy services at [facility]. A summary of our findings can be found in the initial assessment below. This includes our plan of care. If you have any questions or concerns regarding these findings, please do not hesitate to contact me at the office phone number. Thank you for the referral.         Physician Signature:_______________________________Date:__________________  By signing above (or electronic signature), therapists plan is approved by physician        LOWER EXTREMITY PHYSICAL THERAPY EVALUATION      Evaluation Date: 7/1/2022    Patient Name: Solitario Washington   YOB: 1975    Medical Diagnosis:  Plantar fasciitis [M72.2]  Posterior tibial tendinitis, left leg [M76.822]  Onset Date:  Feb '22    Referral Date: 7/1/2022   Referring Provider: Jose Nichols [facility] Provider: Cele Estrada  Restrictions/Precautions:    recent surgical history (relative)    SUBJECTIVE FINDINGS    History of Present Illness:  Pt presents with c/o L foot pain following a tenosynovectomy in February. Pt reports that she is still in pain and having a lot of weakness. Reports doing steps one at a time. Feels like her foot won't support her. Pt also reports the same issue in the R foot - this surgery went well and it healed great. Currently pain is here whole foot and is radiating up her L leg. Sharp shooting pain in bottom of her foot. Reports some tingling and some numbness. Pt also reports \"bad knees. \" Pain increases with being up on her feet. Difficulty getting to laundry room downstairs. Notes difficulty sleeping but not due to foot pain.  Pt has been cleared to return to anything she would like. Pt was immobilized for a couple months post surgery. Medical History: R foot tenosynovectomy  Current Functional Limitations: not doing weightbearing exercise, gym routine   PLOF: indep in ADLs, working out at gym, working as respiratory therapist, swimming    Pain       Patient describes pain to be sharp and shooting at times but most of the time \"it just hurts\"  Patient reports 2/10 pain at present and 8/10 pain at its worst.  Worsened by weightbearing, walking  Improved by rest >1hr, ice, Advil    OBJECTIVE FINDINGS      Palpation/Tenderness/Visual Inspection       TTP along posterior tibial tendon and nerve LLE  Standing posture - L foot pronation, R toe out, tibial ER      Gait/Steps/Balance    []   [facility] [x]    Dysfunction Noted.    Comment: femoral ambulation patterns, decreased stride length    []  All balance WFL unless otherwise noted below:  Single limb stance: Trendelenburg BLE, decreased stance time LLE  Squat: anterior tibial translation, quad dominant BLE    Other Special Tests Lumbar Spine and Hip  Special Test Findings   Standing:    Lumbar reposition []Neg   [x]Pos   Seated:    Slump Test/Dural Tension []Neg   []Pos R   []Pos L   [x]NT       Supine:    90/90 test  []Neg   []Pos R   []Pos L   [x]NT   Gaenslen's test []Neg   []Pos R   []Pos L   [x]NT   Straight Leg Raise []Neg   []Pos R   []Pos L   [x]NT   Lumbar Distraction  []Relief noted   []No relief noted  []Rebound pain   []NT   Hip scour []Neg   []Pos R   []Pos L   [x]NT   Smooth's test []Neg   []Pos R   []Pos L   [x]NT   Jerad's test []Neg   []Pos R   []Pos L   [x]NT   Oscillation []Neg   []Pos R   []Pos L   [x]NT   Femoral nerve tension test []Neg   []Pos R   []Pos L   [x]NT     Sensation/Motor Function   [x] All dermatomes WNL except as marked below   [x] All  myotomes WNL except as marked below      Dermatome Left Right   Anterior groin, 2-3 inches below ASIS (L1-L2)     Middle third anterior thigh (L3)     Patella and med malleolus (L4)     Fibular head and dorsum of foot (L5)     Lateral side and plantar surface of foot (S1)     Medial aspect of posterior thigh (S2)     Perianal area (S3,4)            Range of Motion/Strength Testing     [x] All ROM and strength WNL except as marked below   * Denotes limitation by pain    Range Tested AROM PROM MMT/Resisted    Left Right Left Right Left Right   Hip Extension     3+ 3+   Hip Abduction     3+ 3+   Hip ER     3+ 4-   Knee Flexion     5 5   Knee Extension     5 5   Ankle Dorsiflex     4 5   Ankle Plantarflex     3+ 4   Ankle Inversion     3+ 5   Ankle Eversion     4- 5     Flexibility     [x] All flexibility WNL except as marked below  Muscle Left Right   Hamstrings (90/90) [x]  WNL  [] Tight  [] NT [x]  WNL  [] Tight  [] NT   Gastroc []  WNL  [x] Tight  [] NT []  WNL  [x] Tight  [] NT   TFL/ITB (Bertha) []  WNL  [] Tight  [x] NT []  WNL  [] Tight  [x] NT   Iliopsoas (Mikel) []  WNL  [] Tight  [x] NT []  WNL  [] Tight  [x] NT   Piriformis []  WNL  [] Tight  [x] NT []  WNL  [] Tight  [x] NT     Joint Mobility  Ankle/Foot: TC joint mobility WNL. Midfoot hypomobile at navicular and medial cuneiform . Neural mobility - decreased mobility at posterior tibial nerve at tarsal tunnel and posterior tibial muscle belly    ASSESSMENT  Pt is a 51 y/o presenting to physical therapy with c/o L foot and ankle pain following L posterior tibial tenosynovectomy. Thorough evaluation and examination, identified findings consistent with altered joint mechanics of the midfoot and decreased neural mobility of tibial/posterior tibial nerve. Pt also demonstrated poor muscle firing patterns of glut complex and foot intrinsics. PT recommending skilled physical therapy in order to address these limitations.      Body Structures, Functions, Activity Limitations Requiring Skilled Therapeutic Intervention: Decreased functional mobility ,Decreased ADL status,Decreased ROM,Decreased body mechanics,Decreased strength,Decreased balance,Increased pain     Statement of Medical Necessity: Physical Therapy is both indicated and medically necessary as outlined in the POC to increase the likelihood of meeting the functionally related goals stated below. Eval Complexity:    Decision Making: LOW Complexity    PLAN OF CARE    Frequency: 1-2x/wk for 4 weeks  Current Treatment Recommendations: Therapeutic exercise, therapeutic activity, manual therapy, gait training, neuromuscular re-education    G-CODE  FOTO score nv    GOALS  Short term goal 1: Pt will be indep in HEP  Short term goal 2: Pt will decrease pain 50%  Short term goal 3: Pt will increase L foot mobility to WNL  Short term goal 4: Pt will increase L tibial nerve mobility  Short term goal 5: Pt will return to gym routine without limitation    Thank you for the referral of this patient.      Time In: 700  Time Out: 745  Timed Code Treatment Minutes:  15  minutes            Total Treatment Time: 45  minutes      Olivier Cuenca PT DPT  license #

## 2022-07-01 NOTE — FLOWSHEET NOTE
[facility], [facility], [address], [address]  Phone: [phone]  Fax [phone]    Physical Therapy Daily Treatment Note    Date:  2022    Patient Name:  Lindy Day    :  1975  MRN: [id number]  Medical Diagnosis:  Plantar fasciitis [M72.2]  Posterior tibial tendinitis, left leg [M76.822]  Onset Date:                  Referral Date: 2022           Referring Provider: Britta Nichols [facility] Provider: Kwabena Agee  Restrictions/Precautions:    recent surgical history (relative)  Plan of care signed (Y/N):  N  Visit# / total visits:       G-Code (if applicable): FOTO score nv    Time in:   7:00     Timed Treatment: 10 Total Treatment Time:  45  ________________________________________________________________________________________    Pain Level:    /10  SUBJECTIVE:  See eval    OBJECTIVE:     Exercise/Equipment Resistance/Repetitions Other comments                                                                                                                                             Other Therapeutic Activities:      Manual Treatments:  Midfoot joint mobilization grade V with cavitation. TC posterior glide of talus. Neural tracing L posterior tibial nerve.  ART to posterior tibialis        Modalities:      Test/Measurements:  See eval       ASSESSMENT:   See eval      Treatment/Activity Tolerance:   []Patient tolerated treatment well [] Patient limited by fatique  []Patient limited by pain [] Patient limited by other medical complications  [] Other:     Goals:    Short term goal 1: Pt will be indep in HEP  Short term goal 2: Pt will decrease pain 50%  Short term goal 3: Pt will increase L foot mobility to WNL  Short term goal 4: Pt will increase L tibial nerve mobility  Short term goal 5: Pt will return to gym routine without limitation    Plan: [x] Continue per plan of care [] Alter current plan (see comments)   [x] Plan of care initiated [] Hold pending MD visit [] Discharge      Plan for Next Session:  Biomechanical correction of problems as they present. Facilitate correct muscle firing patterns and activation with appropriate activities. Progress patient as tolerated.      Re-Certification Due Date:         Signature:  Fallon Mendez PT

## 2022-07-05 ENCOUNTER — HOSPITAL ENCOUNTER (OUTPATIENT)
Dept: PHYSICAL THERAPY | Age: 47
Setting detail: THERAPIES SERIES
Discharge: HOME | End: 2022-07-05
Payer: COMMERCIAL

## 2022-07-05 PROCEDURE — 97110 THERAPEUTIC EXERCISES: CPT

## 2022-07-05 PROCEDURE — 97140 MANUAL THERAPY 1/> REGIONS: CPT

## 2022-07-05 NOTE — FLOWSHEET NOTE
Johnna  [facility] and Therapy, [facility], [address], [address]  Phone: [phone]  Fax [phone]    Physical Therapy Daily Treatment Note    Date:  2022    Patient Name:  Ashley Bran    :  1975  MRN: [id number]  Medical Diagnosis:  Plantar fasciitis [M72.2]  Posterior tibial tendinitis, left leg [M76.822]  Onset Date:                  Referral Date: 2022           Referring Provider: Ella Nichols [facility] Provider: Patricio Felix  Restrictions/Precautions:    recent surgical history (relative)  Plan of care signed (Y/N):  N  Visit# / total visits:       G-Code (if applicable): FOTO score nv    Time in:   3:45     Timed Treatment: 45 Total Treatment Time:  45  ________________________________________________________________________________________    Pain Level:    /10  SUBJECTIVE:  Pt reports that she felt better until Friday evening. Reports Saturday she was hurting pretty good.  was better. Doing okay today. OBJECTIVE: [id number]    Exercise/Equipment Resistance/Repetitions Other comments   TG 5 min           Towel crunches 2x1 min    Arch raises 10x5\" hold    DF nerve glide x10     Gastroc stretch 5x10\"    Ankle 4-way x10 ea                                                                                             Other Therapeutic Activities:      Manual Treatments:  Midfoot joint mobilization grade V with cavitation. TC posterior glide of talus. Neural tracing L posterior tibial nerve. ART to posterior tibialis. Raking of the calf and dorsal foot LLE. Modalities:      Test/Measurements:  See eval       ASSESSMENT:   Pt tolerated session well. Increased ankle, foot and neural mobility following session. Initiated HEP without issue. Reported decrease in symptoms post-session (\"feels great\").     Treatment/Activity Tolerance:   [x]Patient tolerated treatment well [] Patient limited by stephan  []Patient limited by pain [] Patient limited by other medical complications  [] Other:     Goals:    Short term goal 1: Pt will be indep in HEP  Short term goal 2: Pt will decrease pain 50%  Short term goal 3: Pt will increase L foot mobility to WNL  Short term goal 4: Pt will increase L tibial nerve mobility  Short term goal 5: Pt will return to gym routine without limitation    Plan: [x] Continue per plan of care [] Alter current plan (see comments)   [] Plan of care initiated [] Hold pending MD visit [] Discharge      Plan for Next Session:  Biomechanical correction of problems as they present. Facilitate correct muscle firing patterns and activation with appropriate activities. Progress patient as tolerated.      Re-Certification Due Date:         Signature:  Brooks Pinedo PT

## 2022-07-11 ENCOUNTER — HOSPITAL ENCOUNTER (OUTPATIENT)
Dept: PHYSICAL THERAPY | Age: 47
Setting detail: THERAPIES SERIES
Discharge: HOME | End: 2022-07-11
Payer: COMMERCIAL

## 2022-07-11 PROCEDURE — 97140 MANUAL THERAPY 1/> REGIONS: CPT

## 2022-07-11 PROCEDURE — 97110 THERAPEUTIC EXERCISES: CPT

## 2022-07-11 NOTE — FLOWSHEET NOTE
[facility], [facility], [address], [address]  Phone: [phone]  Fax [phone]    Physical Therapy Daily Treatment Note    Date:  2022    Patient Name:  Love Cannon    :  1975  MRN: [id number]  Medical Diagnosis:  Plantar fasciitis [M72.2]  Posterior tibial tendinitis, left leg [M76.822]  Onset Date:                  Referral Date: 2022           Referring Provider: Sandra Nichols [facility] Provider: Hoda Mcdonald  Restrictions/Precautions:    recent surgical history (relative)  Plan of care signed (Y/N):  N  Visit# / total visits:  3/8     G-Code (if applicable): FOTO score 44/100    Time in:   7:55     Timed Treatment: 45 Total Treatment Time:  45  ________________________________________________________________________________________    Pain Level:    /10  SUBJECTIVE:  Pt reports that she felt good for a couple hours after the last session but then the pain came back. Notes that it is \"the same. \" Pt reports that she has been doing the exercises at home. Notes that she will get sharp pain that doesn't last.     OBJECTIVE: [id number]    Exercise/Equipment Resistance/Repetitions Other comments   TG 5 min           Towel crunches 2x1 min    Arch raises 10x5\" hold    DF nerve glide x10     Gastroc stretch 5x10\" Slant board   Ankle 4-way            BAPS board x10 F/B  x10 M/L lvl 2                                                                              Other Therapeutic Activities:      Manual Treatments:  Midfoot joint mobilization grade V with cavitation. TC posterior glide of talus. Neural tracing L posterior tibial nerve. ART to posterior tibialis. Raking of the calf and dorsal foot LLE. Modalities:      Test/Measurements:  See eval       ASSESSMENT:   Pt tolerated session well. Increased ankle, foot and neural mobility following session.  Reported decrease in symptoms post-session. Treatment/Activity Tolerance:   [x]Patient tolerated treatment well [] Patient limited by fatique  []Patient limited by pain [] Patient limited by other medical complications  [] Other:     Goals:    Short term goal 1: Pt will be indep in HEP  Short term goal 2: Pt will decrease pain 50%  Short term goal 3: Pt will increase L foot mobility to WNL  Short term goal 4: Pt will increase L tibial nerve mobility  Short term goal 5: Pt will return to gym routine without limitation    Plan: [x] Continue per plan of care [] Alter current plan (see comments)   [] Plan of care initiated [] Hold pending MD visit [] Discharge      Plan for Next Session:  Biomechanical correction of problems as they present. Facilitate correct muscle firing patterns and activation with appropriate activities. Progress patient as tolerated.      Re-Certification Due Date:         Signature:  Raquel Trevino PT

## 2022-07-14 ENCOUNTER — HOSPITAL ENCOUNTER (OUTPATIENT)
Dept: PHYSICAL THERAPY | Age: 47
Setting detail: THERAPIES SERIES
Discharge: HOME | End: 2022-07-14
Payer: COMMERCIAL

## 2022-07-14 PROCEDURE — 97140 MANUAL THERAPY 1/> REGIONS: CPT

## 2022-07-14 PROCEDURE — 97110 THERAPEUTIC EXERCISES: CPT

## 2022-07-14 NOTE — FLOWSHEET NOTE
Johnna  79[address] and Therapy, [facility],  [address], [address]  Phone: [phone]  Fax [phone]    Physical Therapy Daily Treatment Note    Date:  2022    Patient Name:  Matilde Lea    :  1975  MRN: [id number]  Medical Diagnosis:  Plantar fasciitis [M72.2]  Posterior tibial tendinitis, left leg [M76.822]  Onset Date:                  Referral Date: 2022           Referring Provider: Aury Mckeon [facility] Provider: Prince Workman 150  Restrictions/Precautions:    recent surgical history (relative)  Plan of care signed (Y/N):  N  Visit# / total visits:       G-Code (if applicable): FOTO score 44/100    Time in:   9:45     Timed Treatment: 45 Total Treatment Time:  45  ________________________________________________________________________________________    Pain Level:    /10  SUBJECTIVE:  Pt reports that she is feeling better. Reports that she is still having pain but it does feel less. OBJECTIVE: [id number]    Exercise/Equipment Resistance/Repetitions Other comments   TG 5 min           Towel crunches    Arch raises    DF nerve glide    Gastroc stretch 3x30\" Slant board   Ankle 4-way            BAPS board x10 F/B  x10 M/L  x10 circles CW/CCW lvl 2   Heel raise x15         Airex balance    Feet together EC    Tandem    Standing hip abduction     1 min  1 min BLE  x10 B       orange                                                      Other Therapeutic Activities:      Manual Treatments:  Midfoot joint mobilization grade V with cavitation. TC posterior glide of talus. Neural tracing L posterior tibial nerve. ART to posterior tibialis. Raking of the calf and dorsal foot LLE. Modalities:      Test/Measurements:  See eval       ASSESSMENT:   Pt tolerated session well. Increased ankle, foot and neural mobility following session. Reported decrease in symptoms post-session.  Progressed standing exercise

## 2022-07-18 ENCOUNTER — HOSPITAL ENCOUNTER (OUTPATIENT)
Dept: PHYSICAL THERAPY | Age: 47
Setting detail: THERAPIES SERIES
Discharge: HOME | End: 2022-07-18
Payer: COMMERCIAL

## 2022-07-18 PROCEDURE — 97110 THERAPEUTIC EXERCISES: CPT

## 2022-07-18 PROCEDURE — 97140 MANUAL THERAPY 1/> REGIONS: CPT

## 2022-07-18 NOTE — FLOWSHEET NOTE
session well. Increased ankle, foot and neural mobility following session. Reported decrease in symptoms post-session. Progressed standing exercise without issue. Treatment/Activity Tolerance:   [x]Patient tolerated treatment well [] Patient limited by fatique  []Patient limited by pain [] Patient limited by other medical complications  [] Other:     Goals:    Short term goal 1: Pt will be indep in HEP  Short term goal 2: Pt will decrease pain 50%  Short term goal 3: Pt will increase L foot mobility to WNL  Short term goal 4: Pt will increase L tibial nerve mobility  Short term goal 5: Pt will return to gym routine without limitation    Plan: [x] Continue per plan of care [] Alter current plan (see comments)   [] Plan of care initiated [] Hold pending MD visit [] Discharge      Plan for Next Session:  Biomechanical correction of problems as they present. Facilitate correct muscle firing patterns and activation with appropriate activities. Progress patient as tolerated.      Re-Certification Due Date:         Signature:  Maribeth Mason, PT

## 2022-07-25 ENCOUNTER — HOSPITAL ENCOUNTER (OUTPATIENT)
Dept: PHYSICAL THERAPY | Age: 47
Setting detail: THERAPIES SERIES
Discharge: HOME | End: 2022-07-25
Payer: COMMERCIAL

## 2022-07-25 PROCEDURE — 97110 THERAPEUTIC EXERCISES: CPT

## 2022-07-25 PROCEDURE — 97140 MANUAL THERAPY 1/> REGIONS: CPT

## 2022-07-25 NOTE — FLOWSHEET NOTE
[facility] and Therapy, [facility], [address], [address]  Phone: [phone]  Fax [phone]    Physical Therapy Daily Treatment Note    Date:  2022    Patient Name:  Karyna Farfan    :  1975  MRN: [id number]  Medical Diagnosis:  Plantar fasciitis [M72.2]  Posterior tibial tendinitis, left leg [M76.822]  Onset Date:                  Referral Date: 2022           Referring Provider: Liliana Nichols [facility] Provider: Donny Millan  Restrictions/Precautions:    recent surgical history (relative)  Plan of care signed (Y/N):  N  Visit# / total visits:       G-Code (if applicable): FOTO score 44/100    Time in:   7:45     Timed Treatment: 45  Total Treatment Time:  45  ________________________________________________________________________________________    Pain Level:    /10  SUBJECTIVE:  Pt reports that it has been a bad last week. Notes that from last Tuesday evening until  she was hurting. Notes a pocket a fluid around the inside of her ankle. OBJECTIVE: [id number]    Exercise/Equipment Resistance/Repetitions Other comments   TG 5 min           Towel crunches    Arch raises    DF nerve glide    Gastroc stretch 3x30\" Slant board   Ankle 4-way            BAPS board x10 F/B  x10 M/L  x10 circles CW/CCW lvl 2   Heel raise x15         Airex balance    Feet together EC    Tandem    Standing hip abduction     1 min  1 min BLE  x10 B       orange   Rockerboard (AP, ML) 1 min rocking  1 min balane    Standing hip extension X15 LLE stance SC                                        Other Therapeutic Activities:      Manual Treatments:  Midfoot joint mobilization grade V with cavitation. TC posterior glide of talus. Neural tracing L posterior tibial nerve. ART to posterior tibialis. Raking of the calf and dorsal foot LLE.     Modalities:      Test/Measurements:  See eval       ASSESSMENT:   Pt tolerated session well. Increased ankle, foot and neural mobility following session. Reported decrease in symptoms post-session. Progressed standing exercise without issue. Treatment/Activity Tolerance:   [x]Patient tolerated treatment well [] Patient limited by fatique  []Patient limited by pain [] Patient limited by other medical complications  [] Other:     Goals:    Short term goal 1: Pt will be indep in HEP  Short term goal 2: Pt will decrease pain 50%  Short term goal 3: Pt will increase L foot mobility to WNL  Short term goal 4: Pt will increase L tibial nerve mobility  Short term goal 5: Pt will return to gym routine without limitation    Plan: [x] Continue per plan of care [] Alter current plan (see comments)   [] Plan of care initiated [] Hold pending MD visit [] Discharge      Plan for Next Session:  Biomechanical correction of problems as they present. Facilitate correct muscle firing patterns and activation with appropriate activities. Progress patient as tolerated.      Re-Certification Due Date:         Signature:  Wilfred Comer PT

## 2022-08-02 ENCOUNTER — HOSPITAL ENCOUNTER (OUTPATIENT)
Dept: PHYSICAL THERAPY | Age: 47
Setting detail: THERAPIES SERIES
Discharge: HOME | End: 2022-08-02
Payer: COMMERCIAL

## 2022-08-02 PROCEDURE — 97110 THERAPEUTIC EXERCISES: CPT

## 2022-08-02 PROCEDURE — 97140 MANUAL THERAPY 1/> REGIONS: CPT

## 2022-08-02 NOTE — FLOWSHEET NOTE
[address] [place] [address] and [place] [address] [address]  [place], [address] [place] [address] Dr  Phone: [phone]  Fax [phone]    Physical Therapy Daily Treatment Note    Date:  2022    Patient Name:  Toribio Jones    :  1975  MRN: [id number]  Medical Diagnosis:  Plantar fasciitis [M72.2]  Posterior tibial tendinitis, left leg [M76.822]  Onset Date:                  Referral Date: 2022           Referring Provider: Nanette Nichols [facility] Provider: Lv Huggins  Restrictions/Precautions:    recent surgical history (relative)  Plan of care signed (Y/N):  N  Visit# / total visits:       G-Code (if applicable): FOTO score 63/100 (22)  44/100 (at eval)    Time in:   3:15     Timed Treatment: 45  Total Treatment Time:  45  ________________________________________________________________________________________    Pain Level:    /10  SUBJECTIVE:  Pt reports that her ankle is doing pretty good. OBJECTIVE: [id number]    Exercise/Equipment Resistance/Repetitions Other comments   TG 5 min           Towel crunches    Arch raises    DF nerve glide    Gastroc stretch 3x30\" Slant board   Ankle 4-way            BAPS board x10 F/B  x10 M/L  x10 circles CW/CCW lvl 2   Heel raise x15         Airex balance    Feet together EC    Tandem    Standing hip abduction     1 min  1 min BLE  x10 B       orange   Rockerboard (AP, ML) 1 min rocking  1 min balane    Standing hip extension X15 LLE stance SC   BOSU squat X10                                   Other Therapeutic Activities:      Manual Treatments:  Midfoot joint mobilization grade V with cavitation. TC posterior glide of talus. Neural tracing L posterior tibial nerve. ART to posterior tibialis. Raking of the calf and dorsal foot LLE. Modalities:      Test/Measurements:  See eval       ASSESSMENT:   Pt tolerated session well. Increased ankle, foot and neural mobility following session. Reported decrease in symptoms post-session. Progressed standing exercise without issue. Treatment/Activity Tolerance:   [x]Patient tolerated treatment well [] Patient limited by fatique  []Patient limited by pain [] Patient limited by other medical complications  [] Other:     Goals:    Short term goal 1: Pt will be indep in HEP  Short term goal 2: Pt will decrease pain 50%  Short term goal 3: Pt will increase L foot mobility to WNL  Short term goal 4: Pt will increase L tibial nerve mobility  Short term goal 5: Pt will return to gym routine without limitation    Plan: [x] Continue per plan of care [] Alter current plan (see comments)   [] Plan of care initiated [] Hold pending MD visit [] Discharge      Plan for Next Session:  Biomechanical correction of problems as they present. Facilitate correct muscle firing patterns and activation with appropriate activities. Progress patient as tolerated.      Re-Certification Due Date:         Signature:  Sammy Bernard PT

## 2022-08-09 ENCOUNTER — HOSPITAL ENCOUNTER (OUTPATIENT)
Dept: PHYSICAL THERAPY | Age: 47
Setting detail: THERAPIES SERIES
Discharge: HOME | End: 2022-08-09
Payer: COMMERCIAL

## 2022-08-09 NOTE — PROGRESS NOTES
Physical Therapy  Cancellation/No-show Note  Patient Name:  Wilma Stevens  :  1975   Date:  2022  Cancels to date: 1  No-shows to date: 0    For today's appointment patient:  [x] Cancelled  [] Rescheduled appointment  [] No-show     Reason given by patient:  [] Patient ill  [] Conflicting appointment  [] No transportation    [] Conflict with work  [x] No reason given  [] Other:     Comments:      Electronically signed by:  Olivier Cuenca, PT

## 2022-08-16 ENCOUNTER — HOSPITAL ENCOUNTER (OUTPATIENT)
Dept: PHYSICAL THERAPY | Age: 47
Setting detail: THERAPIES SERIES
Discharge: HOME | End: 2022-08-16
Payer: COMMERCIAL

## 2022-08-16 PROCEDURE — 97140 MANUAL THERAPY 1/> REGIONS: CPT

## 2022-08-16 PROCEDURE — 97110 THERAPEUTIC EXERCISES: CPT

## 2022-08-16 NOTE — FLOWSHEET NOTE
[address] [place] [address] and [place] [address] [address], [address] [place] [address] Dr  Phone: [phone]  Fax [phone]    Physical Therapy Daily Treatment Note    Date:  2022    Patient Name:  Fallon Camp    :  1975  MRN: [id number]  Medical Diagnosis:  Plantar fasciitis [M72.2]  Posterior tibial tendinitis, left leg [M76.822]  Onset Date:                  Referral Date: 2022           Referring Provider: Daryl Wang [facility] Provider: Prince Workman 150  Restrictions/Precautions:    recent surgical history (relative)  Plan of care signed (Y/N):  N  Visit# / total visits:       G-Code (if applicable): FOTO score 63/100 (22)  44/100 (at al)    Time in:   9:00    Timed Treatment: 45  Total Treatment Time:  45  ________________________________________________________________________________________    Pain Level:    /10  SUBJECTIVE:  Pt reports that she has good days and bad days. Notes that on some days her ankle/foot feel the same as it did, other days it feels good. OBJECTIVE: [id number]    Exercise/Equipment Resistance/Repetitions Other comments   TG 5 min           Towel crunches    Arch raises    DF nerve glide    Gastroc stretch 3x30\" Slant board   Ankle 4-way            BAPS board x10 F/B  x10 M/L  x10 circles CW/CCW lvl 2   Heel raise x15         Airex balance    Feet together EC    Tandem/EC    Standing hip abduction     1 min  1 min BLE  x10 B       orange   Rockerboard (AP, ML) 1 min rocking  1 min balance    Standing hip extension SC   BOSU squat X10     Posterior sling X10 B                           Other Therapeutic Activities:      Manual Treatments:  Midfoot joint mobilization grade V with cavitation. TC posterior glide of talus. Neural tracing L posterior tibial nerve. ART to posterior tibialis. Raking of the calf and dorsal foot LLE.     Modalities:      Test/Measurements:  See eval ASSESSMENT:   Pt tolerated session well. Increased ankle, foot and neural mobility following session. Reported decrease in symptoms post-session. Progressed standing exercise without issue. Treatment/Activity Tolerance:   [x]Patient tolerated treatment well [] Patient limited by fatique  []Patient limited by pain [] Patient limited by other medical complications  [] Other:     Goals:    Short term goal 1: Pt will be indep in HEP  Short term goal 2: Pt will decrease pain 50%  Short term goal 3: Pt will increase L foot mobility to WNL  Short term goal 4: Pt will increase L tibial nerve mobility  Short term goal 5: Pt will return to gym routine without limitation    Plan: [x] Continue per plan of care [] Alter current plan (see comments)   [] Plan of care initiated [] Hold pending MD visit [] Discharge      Plan for Next Session:  Biomechanical correction of problems as they present. Facilitate correct muscle firing patterns and activation with appropriate activities. Progress patient as tolerated.      Re-Certification Due Date:         Signature:  Antelmo Castro, PT

## 2022-08-25 ENCOUNTER — TELEMEDICINE (OUTPATIENT)
Dept: FAMILY MEDICINE CLINIC | Age: 47
End: 2022-08-25
Payer: COMMERCIAL

## 2022-08-25 DIAGNOSIS — M54.2 CHRONIC NECK PAIN: ICD-10-CM

## 2022-08-25 DIAGNOSIS — E03.9 ACQUIRED HYPOTHYROIDISM: Primary | ICD-10-CM

## 2022-08-25 DIAGNOSIS — G43.109 MIGRAINE WITH AURA AND WITHOUT STATUS MIGRAINOSUS, NOT INTRACTABLE: ICD-10-CM

## 2022-08-25 DIAGNOSIS — D50.9 IRON DEFICIENCY ANEMIA, UNSPECIFIED IRON DEFICIENCY ANEMIA TYPE: ICD-10-CM

## 2022-08-25 DIAGNOSIS — G89.29 CHRONIC NECK PAIN: ICD-10-CM

## 2022-08-25 PROCEDURE — 99214 OFFICE O/P EST MOD 30 MIN: CPT | Performed by: FAMILY MEDICINE

## 2022-08-25 RX ORDER — LIDOCAINE 50 MG/G
1 PATCH TOPICAL DAILY
Qty: 30 PATCH | Refills: 11 | Status: SHIPPED | OUTPATIENT
Start: 2022-08-25

## 2022-08-25 RX ORDER — MULTIVITAMIN WITH IRON
250 TABLET ORAL DAILY
COMMUNITY

## 2022-08-25 RX ORDER — LEVOTHYROXINE SODIUM 88 UG/1
TABLET ORAL
Qty: 90 TABLET | Refills: 1 | Status: SHIPPED | OUTPATIENT
Start: 2022-08-25

## 2022-08-25 ASSESSMENT — ENCOUNTER SYMPTOMS
NAUSEA: 1
PHOTOPHOBIA: 1

## 2022-08-25 NOTE — PROGRESS NOTES
Destin Kendrick is a 52 y.o. female    Chief Complaint   Patient presents with    Discuss Medications     Lidocaine patch- neck pain 5%    Other     F/u on migraine, hypothyroidism, anemia       HPI:    The patient was evaluated through a synchronous (real-time) audio-video encounter. The patient (or guardian if applicable) is aware that this is a billable service, which includes applicable co-pays. This Virtual Visit was conducted with patient's (and/or legal guardian's) consent. The visit was conducted pursuant to the emergency declaration under the [address], [facility] authority and the "[employer] Act. Patient identification was verified, and a caregiver was present when appropriate. The patient was located at home. Provider was located at [facility] (Appt Dept): [address]  [address] Migraine   This is a chronic problem. The current episode started more than 1 year ago. The problem occurs monthly. The problem has been waxing and waning. The pain is located in the Right unilateral region. Associated symptoms include nausea, phonophobia and photophobia. The symptoms are aggravated by menstrual cycle. She has tried triptans for the symptoms. The treatment provided significant relief. Her past medical history is significant for migraine headaches. Acquired hypothyroidism. This is a chronic issue. The patient takes her medicine in the morning on an empty stomach. Her recent TSH was not within normal limits. Iron deficiency anemia. This is a chronic issue. The patient was found to be iron deficient in the past but could not tolerate iron supplementation. She does have heavy periods. Chronic neck pain. This is a new complaint to me. She tried a friend's lidocaine 5% patches and it really helped. She would like a prescription of her own.   The 4% lidocaine patches did not help as much. Some days she does have tingling down the left arm. ROS:    Review of Systems   Eyes:  Positive for photophobia. Gastrointestinal:  Positive for nausea. There were no vitals taken for this visit. Physical Exam:    Physical Exam  Constitutional:       General: She is not in acute distress. Appearance: She is well-developed and normal weight. She is not toxic-appearing. HENT:      Head: Normocephalic. Neurological:      Mental Status: She is alert. Psychiatric:         Mood and Affect: Mood normal.         Behavior: Behavior normal.         Thought Content: Thought content normal.       Current Outpatient Medications   Medication Sig Dispense Refill    magnesium (MAGNESIUM-OXIDE) 250 MG TABS tablet Take 250 mg by mouth daily      lidocaine (LIDODERM) 5 % Place 1 patch onto the skin daily 12 hours on, 12 hours off. 30 patch 11    levothyroxine (SYNTHROID) 88 MCG tablet TAKE ONE TABLET BY MOUTH EVERY MORNING BEFORE BREAKFAST. Do not fill until desired. 90 tablet 1    ondansetron (ZOFRAN ODT) 4 MG disintegrating tablet Take 1 tablet by mouth every 8 hours as needed for Nausea or Vomiting 30 tablet 5    Melatonin 12 MG TABS Take 12 mg by mouth nightly as needed      Cholecalciferol (VITAMIN D3) 50 MCG (2000 UT) CAPS Take by mouth      folic acid (FOLVITE) 247 MCG tablet Take 400 mcg by mouth daily      albuterol sulfate HFA (PROVENTIL;VENTOLIN;PROAIR) 108 (90 Base) MCG/ACT inhaler Inhale 2 puffs into the lungs every 6 hours as needed for Wheezing      fluticasone (FLONASE) 50 MCG/ACT nasal spray 1 spray by Each Nostril route daily      rizatriptan (MAXALT-MLT) 10 MG disintegrating tablet Take 1 tablet by mouth once as needed for Migraine May repeat in 2 hours if needed 30 tablet 5     No current facility-administered medications for this visit. Assessment:    1. Acquired hypothyroidism    2. Iron deficiency anemia, unspecified iron deficiency anemia type    3.  Migraine with aura and without status migrainosus, not intractable    4. Chronic neck pain        Plan:    1. Acquired hypothyroidism  Recent TSH was abnormal.  A dose increase was sent in.  - levothyroxine (SYNTHROID) 88 MCG tablet; Take 1 tablet by mouth every morning (before breakfast)  Dispense: 90 tablet; Refill: 1  - TSH with Reflex; Future    2. Iron deficiency anemia, unspecified iron deficiency anemia type  She felt better with iron transfusion. Continue follow-up with [facility]. 3. Migraine with aura and without status migrainosus, not intractable  Refill Maxalt. Given Zofran for nausea. - rizatriptan (MAXALT-MLT) 10 MG disintegrating tablet; Take 1 tablet by mouth once as needed for Migraine May repeat in 2 hours if needed  Dispense: 30 tablet; Refill: 5  - Magnesium  - ondansetron (ZOFRAN ODT) 4 MG disintegrating tablet; Take 1 tablet by mouth every 8 hours as needed for Nausea or Vomiting  Dispense: 30 tablet; Refill: 1    4. Chronic neck pain  I will send over lidocaine 5% patches. Discussed how they may not be covered by insurance but the lidocaine 4% patches are over-the-counter. Given exercises as well. Return in about 6 months (around 2/25/2023) for Preventative.

## 2022-09-11 LAB — SOLUBLE TRANSFERRIN RECEPT: 4.8 MG/L (ref 1.9–4.4)

## 2023-01-17 ENCOUNTER — HOSPITAL ENCOUNTER (OUTPATIENT)
Dept: WOMENS IMAGING | Age: 48
Discharge: HOME | End: 2023-01-17
Payer: COMMERCIAL

## 2023-01-17 DIAGNOSIS — Z12.31 ENCOUNTER FOR SCREENING MAMMOGRAM FOR BREAST CANCER: ICD-10-CM

## 2023-01-17 PROCEDURE — 77067 SCR MAMMO BI INCL CAD: CPT

## 2023-02-07 ENCOUNTER — HOSPITAL ENCOUNTER (OUTPATIENT)
Age: 48
Discharge: HOME | End: 2023-02-07
Payer: COMMERCIAL

## 2023-02-07 LAB
FOLLICLE STIMULATING HORMONE: 7.9 MIU/ML
TSH SERPL DL<=0.05 MIU/L-ACNC: 0.01 UIU/ML (ref 0.27–4.2)

## 2023-02-07 PROCEDURE — 84443 ASSAY THYROID STIM HORMONE: CPT

## 2023-02-07 PROCEDURE — 83001 ASSAY OF GONADOTROPIN (FSH): CPT

## 2023-02-08 ENCOUNTER — TELEPHONE (OUTPATIENT)
Dept: FAMILY MEDICINE CLINIC | Age: 48
End: 2023-02-08

## 2023-02-08 DIAGNOSIS — E03.9 ACQUIRED HYPOTHYROIDISM: Primary | ICD-10-CM

## 2023-02-08 NOTE — TELEPHONE ENCOUNTER
----- Message from Mey Chen sent at 2/8/2023  3:34 PM EST -----  Subject: Message to Provider    QUESTIONS  Information for Provider? Patient states her TSH levels were low from the   blood work done by her gynecologist. Patient's question is does she need   to schedule an appt. with her pcp to have her medication adjusted or can   this be done without an appt.  ---------------------------------------------------------------------------  --------------  Nancy HERNANDEZ  [phone]; OK to leave message on voicemail  ---------------------------------------------------------------------------  --------------  SCRIPT ANSWERS  Relationship to Patient?  Self

## 2023-02-09 ENCOUNTER — HOSPITAL ENCOUNTER (OUTPATIENT)
Dept: WOMENS IMAGING | Age: 48
Discharge: HOME | End: 2023-02-09
Payer: COMMERCIAL

## 2023-02-09 DIAGNOSIS — R92.8 ABNORMAL MAMMOGRAM: ICD-10-CM

## 2023-02-09 PROCEDURE — 76642 ULTRASOUND BREAST LIMITED: CPT

## 2023-02-10 NOTE — TELEPHONE ENCOUNTER
Called patient she states that she is referring to the 2/7/2023 lab and she wants to know if you could put in  repeat TSH, along with T3, and T4? And then re-adjust medication if needed.

## 2023-02-10 NOTE — TELEPHONE ENCOUNTER
Does she mean she wants to recheck that thyroid level? That is fine. I put orders again for TSH, free T4 and free T3. On the last visit, her thyroid level was too low and we had to increase the dose.

## 2023-02-10 NOTE — TELEPHONE ENCOUNTER
Is she referring to the TSH from 2/7/2023. It showed the thyroid level is too high and that we must lower the dose. I could send dose adjustment if desired?

## 2023-02-11 LAB
HPV COMMENT: NORMAL
HPV TYPE 16: NOT DETECTED
HPV TYPE 18: NOT DETECTED
HPVOH (OTHER TYPES): NOT DETECTED

## 2023-02-16 DIAGNOSIS — E03.9 ACQUIRED HYPOTHYROIDISM: ICD-10-CM

## 2023-02-16 LAB
T3 FREE: 3.4 PG/ML (ref 2.3–4.2)
T4 FREE: 1.6 NG/DL (ref 0.9–1.8)
TSH REFLEX: 0.02 UIU/ML (ref 0.27–4.2)

## 2023-02-17 LAB — T3 TOTAL: 1.4 NG/ML (ref 0.8–2)

## 2023-02-20 DIAGNOSIS — E03.9 ACQUIRED HYPOTHYROIDISM: ICD-10-CM

## 2023-02-20 RX ORDER — LEVOTHYROXINE SODIUM 0.07 MG/1
75 TABLET ORAL
Qty: 90 TABLET | Refills: 1 | Status: SHIPPED | OUTPATIENT
Start: 2023-02-20

## 2023-02-27 ENCOUNTER — TELEMEDICINE (OUTPATIENT)
Dept: PRIMARY CARE CLINIC | Age: 48
End: 2023-02-27
Payer: COMMERCIAL

## 2023-02-27 DIAGNOSIS — J40 BRONCHITIS: Primary | ICD-10-CM

## 2023-02-27 DIAGNOSIS — R05.1 ACUTE COUGH: ICD-10-CM

## 2023-02-27 PROCEDURE — 99213 OFFICE O/P EST LOW 20 MIN: CPT | Performed by: NURSE PRACTITIONER

## 2023-02-27 RX ORDER — DOXYCYCLINE HYCLATE 100 MG
100 TABLET ORAL 2 TIMES DAILY
Qty: 14 TABLET | Refills: 0 | Status: SHIPPED | OUTPATIENT
Start: 2023-02-27 | End: 2023-03-06

## 2023-02-27 RX ORDER — PREDNISONE 20 MG/1
20 TABLET ORAL 2 TIMES DAILY
Qty: 10 TABLET | Refills: 0 | Status: SHIPPED | OUTPATIENT
Start: 2023-02-27 | End: 2023-03-04

## 2023-02-27 RX ORDER — DEXTROMETHORPHAN HYDROBROMIDE AND PROMETHAZINE HYDROCHLORIDE 15; 6.25 MG/5ML; MG/5ML
5 SYRUP ORAL 4 TIMES DAILY PRN
Qty: 120 ML | Refills: 0 | Status: SHIPPED | OUTPATIENT
Start: 2023-02-27

## 2023-02-27 ASSESSMENT — ENCOUNTER SYMPTOMS
SINUS PRESSURE: 1
CHEST TIGHTNESS: 1
SINUS PAIN: 1
COUGH: 1

## 2023-02-27 NOTE — PROGRESS NOTES
[facility] (:  1975) is a Established patient, here for evaluation of the following:    Cough       Assessment & Plan:  Below is the assessment and plan developed based on review of pertinent history, physical exam, labs, studies, and medications. 1. Bronchitis  Viral URI/ Bronchitis Symptom Management with over the counter treatments:  Symptoms may last up to 14 days but should improve significantly by day 7    Sore Throat:  Ice chips and warm drinks, throat lozenges, Ibuprofen as directed for dosing  Throat Coat Tea (box of bags in organic aisle at grocery)    Sinus Congestion:  May last up to 14 days  Saline Nasal Spray  [place] Pot Rinses with Saline  Nasal Steroid Spray (Nasonex, Flonase, Rhinocort- all OTC) after sinus rinsing twice daily  Sudafed 120mg twice daily if not hypertensive  Coricidan HBP or mucinex if hypertensive    Runny Nose:  Afrin nasal spray 3-5 days max  [place]ya Pot irrigation  Steroid Nasal Spray  Benadryl at bedtime  OTC antihistamine non drowsy- Allegra, Zyrtec, Claritin    Cough: May last up to 6 weeks  Cough suppressants- Delsym, \"DM\" containing products  Throat lozenges  Guaifenesin (Mucinex) for thick secretions, dink plenty of fluids with this    Call the office for:  Fever over 101  Symptoms worsen or fail to improve with OTC medications after 10 days  You have bloody phlegm or bloody sinus drainage  Change or worsening of symptoms    -     predniSONE (DELTASONE) 20 MG tablet; Take 1 tablet by mouth 2 times daily for 5 days, Disp-10 tablet, R-0Normal  -     doxycycline hyclate (VIBRA-TABS) 100 MG tablet; Take 1 tablet by mouth 2 times daily for 7 days, Disp-14 tablet, R-0Normal  2. Acute cough  -     promethazine-dextromethorphan (PROMETHAZINE-DM) 6.25-15 MG/5ML syrup; Take 5 mLs by mouth 4 times daily as needed for Cough, Disp-120 mL, R-0Normal  No follow-ups on file.     Subjective:   Acute Bronchitis  Patient presents for evaluation of chills, nasal congestion, nonproductive cough, and sinus pressure, chest tightness. She has some green nasal secretions the last few days. She has a headache from coughing. The cough was aggravated when she was speaking. She gets SOB when coughing. Symptoms began 1 week ago and are gradually worsening since that time. She had a viral respiratory illness on 2/5. She felt better after about a week and then got sick again. She has taken some mucinex DM. Review of Systems   Constitutional:  Positive for chills. HENT:  Positive for congestion, sinus pressure and sinus pain. Respiratory:  Positive for cough and chest tightness. Neurological:  Positive for headaches.      Objective:  Patient-Reported Vitals  No data recorded     Patient-Reported Vitals 8/25/2022   Patient-Reported Weight 167lbs   Patient-Reported Height -   Patient-Reported Pulse -   Patient-Reported Temperature -   Patient-Reported SpO2 -        Physical Exam:  [INSTRUCTIONS:  \"[x]\" Indicates a positive item  \"[]\" Indicates a negative item  -- DELETE ALL ITEMS NOT EXAMINED]    Constitutional: [x] Appears well-developed and well-nourished [x] No apparent distress      [] Abnormal -     Mental status: [x] Alert and awake  [x] Oriented to person/place/time [x] Able to follow commands    [] Abnormal -     Eyes:   EOM    [x]  Normal    [] Abnormal -   Sclera  [x]  Normal    [] Abnormal -          Discharge [x]  None visible   [] Abnormal -     HENT: [x] Normocephalic, atraumatic  [] Abnormal -   [x] Mouth/Throat: Mucous membranes are moist    External Ears [x] Normal  [] Abnormal -    Neck: [x] No visualized mass [] Abnormal -     Pulmonary/Chest: [x] Respiratory effort normal   [x] No visualized signs of difficulty breathing or respiratory distress        [] Abnormal -      Musculoskeletal:   [x] Normal gait with no signs of ataxia         [x] Normal range of motion of neck        [] Abnormal -     Neurological:        [x] No Facial Asymmetry (Cranial nerve 7 motor function) (limited exam due to video visit)          [x] No gaze palsy        [] Abnormal -          Skin:        [x] No significant exanthematous lesions or discoloration noted on facial skin         [] Abnormal -            Psychiatric:       [x] Normal Affect [] Abnormal -        [x] No Hallucinations    Other pertinent observable physical exam findings:-        On this date 2/27/2023 I have spent 20 minutes reviewing previous notes, test results and face to face (virtual) with the patient discussing the diagnosis and importance of compliance with the treatment plan as well as documenting on the day of the visitDiana Sepulveda, was evaluated through a synchronous (real-time) audio-video encounter. The patient (or guardian if applicable) is aware that this is a billable service, which includes applicable co-pays. This Virtual Visit was conducted with patient's (and/or legal guardian's) consent. The visit was conducted pursuant to the emergency declaration under the [facility], [facility] authority and the [employer] Act. Patient identification was verified, and a caregiver was present when appropriate.    The patient was located at Home: [address] [place] [address]  Provider was located at [facility] [address] MEGAN Carpio

## 2023-05-12 RX ORDER — UBROGEPANT 50 MG/1
50 TABLET ORAL DAILY PRN
Qty: 30 TABLET | Refills: 1 | Status: SHIPPED | OUTPATIENT
Start: 2023-05-12

## 2023-05-12 RX ORDER — UBROGEPANT 50 MG/1
50 TABLET ORAL DAILY PRN
Qty: 30 TABLET | Refills: 1 | Status: SHIPPED | OUTPATIENT
Start: 2023-05-12 | End: 2023-05-12 | Stop reason: SDUPTHER

## 2023-05-16 ENCOUNTER — TELEPHONE (OUTPATIENT)
Dept: ADMINISTRATIVE | Age: 48
End: 2023-05-16

## 2023-09-26 ENCOUNTER — TELEPHONE (OUTPATIENT)
Dept: FAMILY MEDICINE CLINIC | Age: 48
End: 2023-09-26

## 2023-09-28 ENCOUNTER — TELEPHONE (OUTPATIENT)
Dept: ADMINISTRATIVE | Age: 48
End: 2023-09-28

## 2023-09-28 NOTE — TELEPHONE ENCOUNTER
Submitted PA for Ubrelvy 50MG tablets  [facility]ia [facility] [employer] Key: [id number] STATUS: PENDING. Follow up done daily; if no response in three days we will refax for status check. If another three days goes by with no response we will call the insurance for status.

## 2023-09-29 NOTE — TELEPHONE ENCOUNTER
Plan would not Approve Qty of 30 per 30 however they did approve plans quantity limit of 16 tablets for 30 days. See attached letter. Please notify patient. Thank you.

## 2023-09-29 NOTE — TELEPHONE ENCOUNTER
Spoke with patient and relayed message. She stated she just picked up this medication today from [facility] (10 tablets). I don't see this in her chart as being called in. Dar Nunez it has Dr. Angeline Munoz name on it. Please advise?

## 2023-10-24 ENCOUNTER — OFFICE VISIT (OUTPATIENT)
Dept: FAMILY MEDICINE CLINIC | Age: 48
End: 2023-10-24
Payer: COMMERCIAL

## 2023-10-24 VITALS
BODY MASS INDEX: 29.06 KG/M2 | DIASTOLIC BLOOD PRESSURE: 70 MMHG | OXYGEN SATURATION: 97 % | SYSTOLIC BLOOD PRESSURE: 120 MMHG | WEIGHT: 174.4 LBS | HEIGHT: 65 IN | HEART RATE: 58 BPM

## 2023-10-24 DIAGNOSIS — Z00.00 PREVENTATIVE HEALTH CARE: Primary | ICD-10-CM

## 2023-10-24 DIAGNOSIS — E78.2 MIXED HYPERLIPIDEMIA: ICD-10-CM

## 2023-10-24 DIAGNOSIS — G43.109 MIGRAINE WITH AURA AND WITHOUT STATUS MIGRAINOSUS, NOT INTRACTABLE: ICD-10-CM

## 2023-10-24 DIAGNOSIS — R06.83 SNORING: ICD-10-CM

## 2023-10-24 DIAGNOSIS — D50.9 IRON DEFICIENCY ANEMIA, UNSPECIFIED IRON DEFICIENCY ANEMIA TYPE: ICD-10-CM

## 2023-10-24 DIAGNOSIS — E03.9 ACQUIRED HYPOTHYROIDISM: ICD-10-CM

## 2023-10-24 PROCEDURE — 99396 PREV VISIT EST AGE 40-64: CPT | Performed by: FAMILY MEDICINE

## 2023-10-24 RX ORDER — LEVOTHYROXINE SODIUM 0.07 MG/1
75 TABLET ORAL
Qty: 90 TABLET | Refills: 3 | Status: SHIPPED | OUTPATIENT
Start: 2023-10-24

## 2023-10-24 RX ORDER — UBROGEPANT 50 MG/1
50 TABLET ORAL DAILY PRN
Qty: 30 TABLET | Refills: 11 | Status: SHIPPED | OUTPATIENT
Start: 2023-10-24

## 2023-10-24 SDOH — ECONOMIC STABILITY: HOUSING INSECURITY
IN THE LAST 12 MONTHS, WAS THERE A TIME WHEN YOU DID NOT HAVE A STEADY PLACE TO SLEEP OR SLEPT IN A SHELTER (INCLUDING NOW)?: NO

## 2023-10-24 SDOH — ECONOMIC STABILITY: INCOME INSECURITY: HOW HARD IS IT FOR YOU TO PAY FOR THE VERY BASICS LIKE FOOD, HOUSING, MEDICAL CARE, AND HEATING?: NOT HARD AT ALL

## 2023-10-24 SDOH — ECONOMIC STABILITY: FOOD INSECURITY: WITHIN THE PAST 12 MONTHS, THE FOOD YOU BOUGHT JUST DIDN'T LAST AND YOU DIDN'T HAVE MONEY TO GET MORE.: NEVER TRUE

## 2023-10-24 SDOH — ECONOMIC STABILITY: FOOD INSECURITY: WITHIN THE PAST 12 MONTHS, YOU WORRIED THAT YOUR FOOD WOULD RUN OUT BEFORE YOU GOT MONEY TO BUY MORE.: NEVER TRUE

## 2023-10-24 ASSESSMENT — PATIENT HEALTH QUESTIONNAIRE - PHQ9
SUM OF ALL RESPONSES TO PHQ QUESTIONS 1-9: 0
2. FEELING DOWN, DEPRESSED OR HOPELESS: 0
SUM OF ALL RESPONSES TO PHQ9 QUESTIONS 1 & 2: 0
SUM OF ALL RESPONSES TO PHQ QUESTIONS 1-9: 0
1. LITTLE INTEREST OR PLEASURE IN DOING THINGS: 0

## 2023-12-04 ENCOUNTER — TELEMEDICINE (OUTPATIENT)
Dept: PRIMARY CARE CLINIC | Age: 48
End: 2023-12-04
Payer: COMMERCIAL

## 2023-12-04 ENCOUNTER — TELEPHONE (OUTPATIENT)
Dept: FAMILY MEDICINE CLINIC | Age: 48
End: 2023-12-04

## 2023-12-04 DIAGNOSIS — J06.9 UPPER RESPIRATORY INFECTION, ACUTE: Primary | ICD-10-CM

## 2023-12-04 PROCEDURE — 99212 OFFICE O/P EST SF 10 MIN: CPT | Performed by: NURSE PRACTITIONER

## 2023-12-04 RX ORDER — COVID-19 ANTIGEN TEST
1 KIT MISCELLANEOUS ONCE
Qty: 1 KIT | Refills: 0 | Status: SHIPPED | OUTPATIENT
Start: 2023-12-04 | End: 2023-12-04

## 2023-12-04 RX ORDER — SOD CHLOR,BICARB/SQUEEZ BOTTLE
1 PACKET, WITH RINSE DEVICE NASAL 4 TIMES DAILY PRN
Qty: 1 EACH | Refills: 0 | Status: SHIPPED | OUTPATIENT
Start: 2023-12-04 | End: 2024-01-03

## 2023-12-04 RX ORDER — BROMPHENIRAMINE MALEATE, PSEUDOEPHEDRINE HYDROCHLORIDE, AND DEXTROMETHORPHAN HYDROBROMIDE 2; 30; 10 MG/5ML; MG/5ML; MG/5ML
5-10 SYRUP ORAL 4 TIMES DAILY PRN
Qty: 200 ML | Refills: 0 | Status: SHIPPED | OUTPATIENT
Start: 2023-12-04 | End: 2023-12-04 | Stop reason: RX

## 2023-12-04 RX ORDER — PSEUDOEPHEDRINE HCL 30 MG
30 TABLET ORAL EVERY 6 HOURS PRN
Qty: 40 TABLET | Refills: 0 | Status: SHIPPED | OUTPATIENT
Start: 2023-12-04 | End: 2024-01-13

## 2023-12-04 RX ORDER — CHLORPHENIRAMINE MALEATE 4 MG/1
4 TABLET ORAL EVERY 6 HOURS PRN
Qty: 40 TABLET | Refills: 0 | Status: SHIPPED | OUTPATIENT
Start: 2023-12-04

## 2023-12-04 ASSESSMENT — ENCOUNTER SYMPTOMS
RHINORRHEA: 1
SORE THROAT: 1
COUGH: 1
WHEEZING: 0
SHORTNESS OF BREATH: 0

## 2023-12-04 NOTE — TELEPHONE ENCOUNTER
Received phone call from Vivek Sanchez the pharmacist from [facility] in regards to medication  chlorpheniramine-pseudoephedrine 4-60 MG TABS per tablet [[id number]]    He stated that the combination tabs are no longer made you can get the two ingredients separate but not get them together please advise

## 2023-12-04 NOTE — PROGRESS NOTES
(resolved after a day), headaches, nasal congestion, postnasal drip, rhinorrhea and a sore throat (resolved). Pertinent negatives include no chest pain, myalgias, shortness of breath or wheezing. She has tried OTC cough suppressant, rest and prescription cough suppressant (theraflu, nyquil, prescription cough syrup) for the symptoms. The treatment provided mild relief. Review of Systems   Constitutional:  Positive for fever (resolved after a day). HENT:  Positive for postnasal drip, rhinorrhea and sore throat (resolved). Respiratory:  Positive for cough. Negative for shortness of breath and wheezing. Cardiovascular:  Negative for chest pain. Musculoskeletal:  Negative for myalgias. Neurological:  Positive for headaches.        Objective:  Patient-Reported Vitals  No data recorded         8/25/2022     8:49 AM   Patient-Reported Vitals   Patient-Reported Weight 167lbs        Physical Exam:  [INSTRUCTIONS:  \"[x]\" Indicates a positive item  \"[]\" Indicates a negative item  -- DELETE ALL ITEMS NOT EXAMINED]    Constitutional: [x] Appears well-developed and well-nourished [x] No apparent distress      [] Abnormal -     Mental status: [x] Alert and awake  [x] Oriented to person/place/time [x] Able to follow commands    [] Abnormal -     Eyes:   EOM    [x]  Normal    [] Abnormal -   Sclera  [x]  Normal    [] Abnormal -          Discharge [x]  None visible   [] Abnormal -     HENT: [x] Normocephalic, atraumatic  [] Abnormal -   [x] Mouth/Throat: Mucous membranes are moist    External Ears [x] Normal  [] Abnormal -    Neck: [x] No visualized mass [] Abnormal -     Pulmonary/Chest: [x] Respiratory effort normal   [x] No visualized signs of difficulty breathing or respiratory distress        [] Abnormal -      Musculoskeletal:   [] Normal gait with no signs of ataxia         [x] Normal range of motion of neck        [] Abnormal -     Neurological:        [x] No Facial Asymmetry (Cranial nerve 7 motor function)

## 2023-12-11 ENCOUNTER — TELEMEDICINE (OUTPATIENT)
Dept: FAMILY MEDICINE CLINIC | Age: 48
End: 2023-12-11
Payer: COMMERCIAL

## 2023-12-11 DIAGNOSIS — J40 BRONCHITIS: Primary | ICD-10-CM

## 2023-12-11 DIAGNOSIS — R05.1 ACUTE COUGH: ICD-10-CM

## 2023-12-11 PROCEDURE — 99213 OFFICE O/P EST LOW 20 MIN: CPT | Performed by: FAMILY MEDICINE

## 2023-12-11 RX ORDER — DEXTROMETHORPHAN HYDROBROMIDE AND PROMETHAZINE HYDROCHLORIDE 15; 6.25 MG/5ML; MG/5ML
5 SYRUP ORAL 4 TIMES DAILY PRN
Qty: 120 ML | Refills: 0 | Status: SHIPPED | OUTPATIENT
Start: 2023-12-11

## 2023-12-11 RX ORDER — DOXYCYCLINE HYCLATE 100 MG
100 TABLET ORAL 2 TIMES DAILY
Qty: 14 TABLET | Refills: 0 | Status: SHIPPED | OUTPATIENT
Start: 2023-12-11 | End: 2023-12-18

## 2023-12-11 RX ORDER — PREDNISONE 20 MG/1
20 TABLET ORAL 2 TIMES DAILY
Qty: 10 TABLET | Refills: 0 | Status: SHIPPED | OUTPATIENT
Start: 2023-12-11 | End: 2023-12-16

## 2023-12-11 ASSESSMENT — ENCOUNTER SYMPTOMS
SORE THROAT: 1
COUGH: 1

## 2023-12-11 NOTE — PROGRESS NOTES
Americo Severance is a 50 y.o. female    Chief Complaint   Patient presents with    Congestion    Cough     Since 11/26/2023       HPI:    The patient was evaluated through a synchronous (real-time) audio-video encounter. The patient (or guardian if applicable) is aware that this is a billable service, which includes applicable co-pays. This Virtual Visit was conducted with patient's (and/or legal guardian's) consent. The visit was conducted pursuant to the emergency declaration under the [address] and the [employer] and [employer] Act. Patient identification was verified, and a caregiver was present when appropriate. The patient was located at home. Provider was located at [facility] (Appt Dept): [address],  [address]. Cough  This is a new problem. The current episode started 1 to 4 weeks ago. The problem has been gradually worsening. The cough is Non-productive. Associated symptoms include a sore throat. Pertinent negatives include no fever. She has tried OTC cough suppressant for the symptoms. The treatment provided mild relief. There is no history of COPD.       ROS:    Review of Systems   Constitutional:  Negative for fever. HENT:  Positive for sore throat. Respiratory:  Positive for cough. There were no vitals taken for this visit. Physical Exam:    Physical Exam  Constitutional:       General: She is not in acute distress. Appearance: Normal appearance. She is not ill-appearing or toxic-appearing. HENT:      Head: Normocephalic. Neurological:      Mental Status: She is alert. Psychiatric:         Mood and Affect: Mood normal.         Behavior: Behavior normal.         Thought Content:  Thought content normal.         Current Outpatient Medications   Medication Sig Dispense Refill    doxycycline hyclate (VIBRA-TABS) 100 MG tablet Take 1 tablet by mouth

## 2023-12-12 ENCOUNTER — OFFICE VISIT (OUTPATIENT)
Dept: PULMONOLOGY | Age: 48
End: 2023-12-12
Payer: COMMERCIAL

## 2023-12-12 VITALS
WEIGHT: 173 LBS | BODY MASS INDEX: 28.82 KG/M2 | RESPIRATION RATE: 20 BRPM | HEART RATE: 72 BPM | OXYGEN SATURATION: 97 % | DIASTOLIC BLOOD PRESSURE: 88 MMHG | SYSTOLIC BLOOD PRESSURE: 132 MMHG | HEIGHT: 65 IN

## 2023-12-12 DIAGNOSIS — Z01.89 NEED FOR ASSESSMENT FOR SLEEP APNEA: ICD-10-CM

## 2023-12-12 DIAGNOSIS — R06.83 SNORING: ICD-10-CM

## 2023-12-12 DIAGNOSIS — G47.19 EXCESSIVE DAYTIME SLEEPINESS: Primary | ICD-10-CM

## 2023-12-12 DIAGNOSIS — R53.82 CHRONIC FATIGUE: ICD-10-CM

## 2023-12-12 PROBLEM — E78.2 MIXED HYPERLIPIDEMIA: Chronic | Status: ACTIVE | Noted: 2022-05-26

## 2023-12-12 PROCEDURE — 99203 OFFICE O/P NEW LOW 30 MIN: CPT

## 2023-12-12 ASSESSMENT — SLEEP AND FATIGUE QUESTIONNAIRES
HOW LIKELY ARE YOU TO NOD OFF OR FALL ASLEEP WHILE SITTING QUIETLY AFTER LUNCH WITHOUT ALCOHOL: 3
HOW LIKELY ARE YOU TO NOD OFF OR FALL ASLEEP WHILE LYING DOWN TO REST IN THE AFTERNOON WHEN CIRCUMSTANCES PERMIT: 3
HOW LIKELY ARE YOU TO NOD OFF OR FALL ASLEEP WHILE WATCHING TV: 3
HOW LIKELY ARE YOU TO NOD OFF OR FALL ASLEEP WHILE SITTING AND READING: 3
HOW LIKELY ARE YOU TO NOD OFF OR FALL ASLEEP IN A CAR, WHILE STOPPED FOR A FEW MINUTES IN TRAFFIC: 2
ESS TOTAL SCORE: 19
NECK CIRCUMFERENCE (INCHES): 13
HOW LIKELY ARE YOU TO NOD OFF OR FALL ASLEEP WHILE SITTING INACTIVE IN A PUBLIC PLACE: 2
HOW LIKELY ARE YOU TO NOD OFF OR FALL ASLEEP WHEN YOU ARE A PASSENGER IN A CAR FOR AN HOUR WITHOUT A BREAK: 3
HOW LIKELY ARE YOU TO NOD OFF OR FALL ASLEEP WHILE SITTING AND TALKING TO SOMEONE: 0

## 2023-12-12 NOTE — PROGRESS NOTES
PULMONARY, CRITICAL CARE AND SLEEP MEDICINE   Outpatient Sleep Consult Note  CC: Sleepiness after noon, recurrent migraines   Referring Provider: Patient is being seen at the request of Dr. Samira Carreon for a consultation to evaluate for Obstructive Sleep Apnea. Presenting HPI 12/12/23:  51 yo female RT @ [facility] with a 2 year history of progressive worsening of sleep quality and daytime sleepiness, worse with weight gain, associated with intermittent snoring. Several years ago she felt similar to this for many years but improved after pursuing strict diet and exercise regimen that precipitated successful weight loss and great energy levels. However, she then had 2 lower extremity surgeries and strayed from the diet and gained about 40 lbs over 2 years. No known observed apneas. She has had 1 episode of choking from sleep with acid reflux and a few episodes of awakening gasping from sleep over the last year, but unsure if associated with anxiety. The conversation of DILMA evaluation was prompted by discussing treatment of her recurrent migraines. + AM headaches/migraines, but do seem to fluctuate according to her menstrual cycle. Has not been evaluated for sleep apnea in the past.  Routine is very consistent. Bedtime 9 pm & rise time 4:30 - 5 am, will rarely sleep until 5:30 on a day off. Awakens 2-3x per night and to restroom 1x/night which is new. Sleep is relatively restorative, although she often wants to go right back to bed, but more sleep does not help. Has satisfactory energy levels during the day until about noon - 1pm then \"hits a wall\" for the rest of the day. Takes a daily nap/rest period starting ~1 - 2 p until 3pm where she is dozing in and out of sleep. Feels about the same after waking. Inquired about narcolepsy d/t an instance over the summer of falling asleep while riding a . [place] is 19. No recent car wrecks/near wrecks because of the sleepiness or nodding off while driving.

## 2024-01-22 ENCOUNTER — OFFICE VISIT (OUTPATIENT)
Dept: OBGYN CLINIC | Age: 49
End: 2024-01-22
Payer: COMMERCIAL

## 2024-01-22 VITALS
HEIGHT: 65 IN | SYSTOLIC BLOOD PRESSURE: 136 MMHG | DIASTOLIC BLOOD PRESSURE: 78 MMHG | OXYGEN SATURATION: 98 % | HEART RATE: 78 BPM | WEIGHT: 169.6 LBS | BODY MASS INDEX: 28.26 KG/M2 | TEMPERATURE: 97.7 F

## 2024-01-22 DIAGNOSIS — Z01.419 ENCOUNTER FOR ANNUAL ROUTINE GYNECOLOGICAL EXAMINATION: Primary | ICD-10-CM

## 2024-01-22 DIAGNOSIS — N81.4 CYSTOCELE WITH PROLAPSE: ICD-10-CM

## 2024-01-22 DIAGNOSIS — N92.0 MENORRHAGIA WITH REGULAR CYCLE: ICD-10-CM

## 2024-01-22 DIAGNOSIS — Z12.31 ENCOUNTER FOR SCREENING MAMMOGRAM FOR BREAST CANCER: ICD-10-CM

## 2024-01-22 PROCEDURE — 99386 PREV VISIT NEW AGE 40-64: CPT | Performed by: OBSTETRICS & GYNECOLOGY

## 2024-01-22 RX ORDER — BIOTIN 10000 MCG
CAPSULE ORAL
COMMUNITY

## 2024-01-22 NOTE — PROGRESS NOTES
tablet by mouth every 8 hours as needed for Nausea or Vomiting 30 tablet 5    Cholecalciferol (VITAMIN D3) 50 MCG (2000 UT) CAPS Take by mouth      rizatriptan (MAXALT-MLT) 10 MG disintegrating tablet Take 1 tablet by mouth once as needed for Migraine May repeat in 2 hours if needed 30 tablet 5    fluticasone (FLONASE) 50 MCG/ACT nasal spray 1 spray by Each Nostril route daily       No current facility-administered medications for this visit.       Allergies:  No Known Allergies    Social History:  Social History     Socioeconomic History    Marital status:      Spouse name: None    Number of children: None    Years of education: None    Highest education level: None   Tobacco Use    Smoking status: Never    Smokeless tobacco: Never   Vaping Use    Vaping Use: Never used   Substance and Sexual Activity    Alcohol use: Yes     Alcohol/week: 8.3 standard drinks of alcohol     Types: 10 Standard drinks or equivalent per week    Drug use: No    Sexual activity: Yes     Partners: Male     Social Determinants of Health     Financial Resource Strain: Low Risk  (10/24/2023)    Overall Financial Resource Strain (CARDIA)     Difficulty of Paying Living Expenses: Not hard at all   Transportation Needs: Unknown (10/24/2023)    PRAPARE - Transportation     Lack of Transportation (Non-Medical): No   Housing Stability: Unknown (10/24/2023)    Housing Stability Vital Sign     Unstable Housing in the Last Year: No       Family History:  Family History   Problem Relation Age of Onset    Asthma Mother     High Blood Pressure Mother     Stroke Mother     High Blood Pressure Father     High Cholesterol Father     Asthma Brother     High Blood Pressure Maternal Grandfather        See above, otherwise denies personal/family history of cervical, uterine, ovarian, vulvar, breast, or colon cancers.    Objective:  /78 (Site: Right Upper Arm, Position: Sitting, Cuff Size: Medium Adult)   Pulse 78   Temp 97.7 °F (36.5 °C)

## 2024-01-23 ASSESSMENT — ENCOUNTER SYMPTOMS
NAUSEA: 0
VOMITING: 0
DIARRHEA: 0
ABDOMINAL PAIN: 0
CONSTIPATION: 0

## 2024-02-12 ENCOUNTER — HOSPITAL ENCOUNTER (OUTPATIENT)
Dept: SLEEP CENTER | Age: 49
Discharge: HOME | End: 2024-02-14
Payer: COMMERCIAL

## 2024-02-12 DIAGNOSIS — Z01.89 NEED FOR ASSESSMENT FOR SLEEP APNEA: ICD-10-CM

## 2024-02-12 DIAGNOSIS — R06.83 SNORING: ICD-10-CM

## 2024-02-12 DIAGNOSIS — R53.82 CHRONIC FATIGUE: ICD-10-CM

## 2024-02-12 DIAGNOSIS — G47.19 EXCESSIVE DAYTIME SLEEPINESS: ICD-10-CM

## 2024-02-12 PROCEDURE — 95806 SLEEP STUDY UNATT&RESP EFFT: CPT

## 2024-02-14 ENCOUNTER — OFFICE VISIT (OUTPATIENT)
Dept: OBGYN CLINIC | Age: 49
End: 2024-02-14
Payer: COMMERCIAL

## 2024-02-14 VITALS
OXYGEN SATURATION: 98 % | WEIGHT: 171.6 LBS | HEIGHT: 65 IN | BODY MASS INDEX: 28.59 KG/M2 | DIASTOLIC BLOOD PRESSURE: 84 MMHG | SYSTOLIC BLOOD PRESSURE: 116 MMHG | HEART RATE: 77 BPM | TEMPERATURE: 97.5 F

## 2024-02-14 DIAGNOSIS — N92.0 MENORRHAGIA WITH REGULAR CYCLE: ICD-10-CM

## 2024-02-14 DIAGNOSIS — N81.4 UTERINE PROLAPSE: Primary | ICD-10-CM

## 2024-02-14 PROBLEM — G47.19 EXCESSIVE DAYTIME SLEEPINESS: Status: ACTIVE | Noted: 2024-02-14

## 2024-02-14 PROCEDURE — 99213 OFFICE O/P EST LOW 20 MIN: CPT | Performed by: OBSTETRICS & GYNECOLOGY

## 2024-02-14 NOTE — PROGRESS NOTES
Return Gyn Office Visit    CC:   Chief Complaint   Patient presents with    Ultrasound    Follow-up       HPI:  49 y.o.  who presents to office for follow-up. Seen end of January for pelvic pressure, concern for prolapse. Was not bad at that visit, however says since that time has gotten worse. Also here for f/u US for heavy menses.    No dysuria/hematuria, just some frequency and urgency.    No period since last visit.     Objective:  /84 (Site: Right Upper Arm, Position: Sitting, Cuff Size: Medium Adult)   Pulse 77   Temp 97.5 °F (36.4 °C) (Temporal)   Ht 1.638 m (5' 4.5\")   Wt 77.8 kg (171 lb 9.6 oz)   SpO2 98%   BMI 29.00 kg/m²   Physical Exam  HENT:      Head: Normocephalic.   Cardiovascular:      Rate and Rhythm: Normal rate.   Pulmonary:      Effort: Pulmonary effort is normal. No respiratory distress.   Abdominal:      Palpations: Abdomen is soft.      Tenderness: There is no abdominal tenderness.   Genitourinary:     Comments: Stage 3 uterine prolapse, with valsalva cervix is past introitus  Neurological:      Mental Status: She is alert.         Imaging:   PELVIC ULTRASOUND without DOPPLER INTERROGATION   NON OB     DATE: 24     PHYSICIAN: MARIA L Quiroz M.D.      SONOGRAPHER: JOSE Mejia [facility]     INDICATION: abnormal uterine bleeding     TYPE OF SCAN: vaginal     FINDINGS:    The cul de sac is normal. No free fluid appreciated.  The cervix is normal and not enlarged.  Nabothian cyst/s is noted within the uterine cervix.  The uterus measures 9.74 cm x 8.35 cm x 5.84 cm.    The uterus is retroverted.  The endometrium measures 8.31 mm.   The myometrium is heterogeneous in appearance. Suspect adenomyosis.     The right ovary is present and normal.  The right ovary measures 2.94 cm x 3.08 cm x 1.44 cm.    Ovary findings: No masses seen.  The right adnexa is normal.     The left ovary is present.  The left ovary measures 3.72 cm x 3.43 cm x 2.85 cm.    Ovary findings: Ovarian cyst seen

## 2024-03-08 ENCOUNTER — OFFICE VISIT (OUTPATIENT)
Dept: PULMONOLOGY | Age: 49
End: 2024-03-08
Payer: COMMERCIAL

## 2024-03-08 VITALS
HEART RATE: 63 BPM | HEIGHT: 64 IN | SYSTOLIC BLOOD PRESSURE: 118 MMHG | RESPIRATION RATE: 17 BRPM | OXYGEN SATURATION: 100 % | BODY MASS INDEX: 29.3 KG/M2 | WEIGHT: 171.6 LBS | DIASTOLIC BLOOD PRESSURE: 70 MMHG

## 2024-03-08 DIAGNOSIS — G47.33 MILD OBSTRUCTIVE SLEEP APNEA: Primary | ICD-10-CM

## 2024-03-08 PROBLEM — E03.9 ACQUIRED HYPOTHYROIDISM: Chronic | Status: ACTIVE | Noted: 2022-05-26

## 2024-03-08 PROBLEM — G47.19 EXCESSIVE DAYTIME SLEEPINESS: Status: RESOLVED | Noted: 2024-02-14 | Resolved: 2024-03-08

## 2024-03-08 PROCEDURE — 99213 OFFICE O/P EST LOW 20 MIN: CPT

## 2024-03-08 ASSESSMENT — SLEEP AND FATIGUE QUESTIONNAIRES
HOW LIKELY ARE YOU TO NOD OFF OR FALL ASLEEP WHILE SITTING QUIETLY AFTER LUNCH WITHOUT ALCOHOL: 3
HOW LIKELY ARE YOU TO NOD OFF OR FALL ASLEEP WHILE LYING DOWN TO REST IN THE AFTERNOON WHEN CIRCUMSTANCES PERMIT: 3
ESS TOTAL SCORE: 17
NECK CIRCUMFERENCE (INCHES): 13
HOW LIKELY ARE YOU TO NOD OFF OR FALL ASLEEP WHILE SITTING INACTIVE IN A PUBLIC PLACE: 1
HOW LIKELY ARE YOU TO NOD OFF OR FALL ASLEEP WHILE SITTING AND TALKING TO SOMEONE: 1
HOW LIKELY ARE YOU TO NOD OFF OR FALL ASLEEP WHEN YOU ARE A PASSENGER IN A CAR FOR AN HOUR WITHOUT A BREAK: 3
HOW LIKELY ARE YOU TO NOD OFF OR FALL ASLEEP WHILE SITTING AND READING: 3
HOW LIKELY ARE YOU TO NOD OFF OR FALL ASLEEP IN A CAR, WHILE STOPPED FOR A FEW MINUTES IN TRAFFIC: 2
HOW LIKELY ARE YOU TO NOD OFF OR FALL ASLEEP WHILE WATCHING TV: 1

## 2024-03-08 NOTE — PROGRESS NOTES
PULMONARY, CRITICAL CARE AND SLEEP MEDICINE   Outpatient Sleep Progress Note  CC: Sleepiness after noon, recurrent migraines   Referring Provider: Dr. Waters     Interval History 3/8/24:  f/u HST  -  Had HST 2/12/24 demonstrating mild DILMA with AHI >5.  ESS is: 17.       Presenting HPI 12/12/23:  47 yo female RT @ [facility] with a 2 year history of progressive worsening of sleep quality and daytime sleepiness, worse with weight gain, associated with intermittent snoring.  Several years ago she felt similar to this for many years but improved after pursuing strict diet and exercise regimen that precipitated successful weight loss and great energy levels.  However, she then had 2 lower extremity surgeries and strayed from the diet and gained about 40 lbs over 2 years.  No known observed apneas.  She has had 1 episode of choking from sleep with acid reflux and a few episodes of awakening gasping from sleep over the last year, but unsure if associated with anxiety.  The conversation of DILMA evaluation was prompted by discussing treatment of her recurrent migraines.  + AM headaches/migraines, but do seem to fluctuate according to her menstrual cycle.  Has not been evaluated for sleep apnea in the past.  Routine is very consistent. Bedtime 9 pm & rise time 4:30 - 5 am, will rarely sleep until 5:30 on a day off.  Awakens 2-3x per night and to restroom 1x/night which is new.  Sleep is relatively restorative, although she often wants to go right back to bed, but more sleep does not help.  Has satisfactory energy levels during the day until about noon - 1pm then \"hits a wall\" for the rest of the day.  Takes a daily nap/rest period starting ~1 - 2 p until 3pm where she is dozing in and out of sleep.  Feels about the same after waking.  Inquired about narcolepsy d/t an instance over the summer of falling asleep while riding a .  [place] is 19.  No recent car wrecks/near wrecks because of the sleepiness or nodding off while

## 2024-03-08 NOTE — PATIENT INSTRUCTIONS
[id number]   [facility]  Oxygen/[phone] [address] [place] [address]   [facility]  Oxygen/[phone] [address]   [place] [address]   [facility]  Oxygen/[phone]  [phone] [address] [place] [address]   [facility]  [phone] [address] [place] [address]  **[place] [address]   [facility])  Oxygen/PAP/NIV   [phone]  [phone] [address] [place] [address]  **[address] [place]   [facility]  [phone] [address] [place] [address]   [facility] [place]  [phone] [address] [place] [address]  **[place] of [place]   [facility]  [phone] [address] [facility] [place] [place] [address]   [facility] [place]   Oxygen/[phone] [address] [place], [place] [address]   [facility] [place]  Oxygen/[phone]  Option 4 [phone] [address] [place] [address]   [facility] [phone] [address] [place] [address]   [facility]  Oxygen/[phone] [address] [facility] [place] [address]   [facility]  Oxygen/[phone] [address] [facility] [place] [address]  **[place]**   [facility]  Oxygen/[phone]  [phone] [address] [place] [address]   [facility]  Oxygen/[phone] [address] [place] [address]   [facility]  (formerly [facility])  CPAP only [phone] [address] [place] [address]  **Near [facility] [phone]    1-800 CPAP (store)

## 2024-03-20 ENCOUNTER — HOSPITAL ENCOUNTER (OUTPATIENT)
Dept: WOMENS IMAGING | Age: 49
Discharge: HOME | End: 2024-03-20
Payer: COMMERCIAL

## 2024-03-20 VITALS — BODY MASS INDEX: 29.88 KG/M2 | WEIGHT: 175 LBS | HEIGHT: 64 IN

## 2024-03-20 DIAGNOSIS — Z01.419 ENCOUNTER FOR ANNUAL ROUTINE GYNECOLOGICAL EXAMINATION: ICD-10-CM

## 2024-03-20 DIAGNOSIS — Z12.31 ENCOUNTER FOR SCREENING MAMMOGRAM FOR BREAST CANCER: ICD-10-CM

## 2024-03-20 PROCEDURE — 77067 SCR MAMMO BI INCL CAD: CPT

## 2024-03-26 ENCOUNTER — OFFICE VISIT (OUTPATIENT)
Dept: UROGYNECOLOGY | Age: 49
End: 2024-03-26
Payer: COMMERCIAL

## 2024-03-26 VITALS
RESPIRATION RATE: 16 BRPM | SYSTOLIC BLOOD PRESSURE: 131 MMHG | DIASTOLIC BLOOD PRESSURE: 91 MMHG | TEMPERATURE: 98.1 F | HEART RATE: 74 BPM

## 2024-03-26 DIAGNOSIS — N81.6 RECTOCELE: ICD-10-CM

## 2024-03-26 DIAGNOSIS — N39.3 STRESS INCONTINENCE: ICD-10-CM

## 2024-03-26 DIAGNOSIS — N81.11 CYSTOCELE, MIDLINE: Primary | ICD-10-CM

## 2024-03-26 DIAGNOSIS — N81.4 UTERINE PROLAPSE: ICD-10-CM

## 2024-03-26 DIAGNOSIS — R39.15 URINARY URGENCY: ICD-10-CM

## 2024-03-26 DIAGNOSIS — R35.0 URINARY FREQUENCY: ICD-10-CM

## 2024-03-26 LAB
BILIRUBIN, POC: NORMAL
BLOOD URINE, POC: NORMAL
CLARITY, POC: CLEAR
COLOR, POC: YELLOW
EMPTY COUGH STRESS TEST: NORMAL
FIRST SENSATION: 10 CC
FULL COUGH STRESS TEST: NORMAL
GLUCOSE URINE, POC: NORMAL
KETONES, POC: NORMAL
LEUKOCYTE EST, POC: NORMAL
MAX SENSATION: 225 CC
NITRATE, URINE POC: NORMAL
NITRITE, POC: NORMAL
PH, POC: 6.5
POST VOID RESIDUAL (PVR): 40 ML
PROTEIN, POC: NORMAL
RBC URINE, POC: NORMAL
SECOND SENSATION: 150 CC
SPASM: NORMAL
SPECIFIC GRAVITY, POC: 1.02
UROBILINOGEN, POC: 0.2
WBC URINE, POC: NORMAL

## 2024-03-26 PROCEDURE — 51725 SIMPLE CYSTOMETROGRAM: CPT | Performed by: OBSTETRICS & GYNECOLOGY

## 2024-03-26 PROCEDURE — 99204 OFFICE O/P NEW MOD 45 MIN: CPT | Performed by: OBSTETRICS & GYNECOLOGY

## 2024-03-26 PROCEDURE — 81002 URINALYSIS NONAUTO W/O SCOPE: CPT | Performed by: OBSTETRICS & GYNECOLOGY

## 2024-03-26 NOTE — PROGRESS NOTES
stiffness.   Psychiatric/Behavioral:  Positive for agitation and sleep disturbance.    All other systems reviewed and are negative.          Objective:     Vital Signs  Vitals:    03/26/24 1420 03/26/24 1421   BP: (!) 152/92 (!) 131/91   Pulse: 74    Resp: 16    Temp: 98.1 °F (36.7 °C)      Physical Exam  Physical Exam  HENT:      Head: Normocephalic and atraumatic.   Eyes:      Conjunctiva/sclera: Conjunctivae normal.   Pulmonary:      Effort: Pulmonary effort is normal.   Abdominal:      Palpations: Abdomen is soft.   Genitourinary:     Comments: Enlarged genital hiatus, uterine prolapse to introitus, cystocele and rectocele past introitus  Musculoskeletal:      Cervical back: Normal range of motion and neck supple.   Skin:     General: Skin is warm and dry.   Neurological:      Mental Status: She is alert and oriented to person, place, and time.             Office Fill Study/Urine Dip:     Using sterile technique a manometry catheter was placed. Patient's bladder was filled with sterile water by gravity. Capacity and storage volumes were measured. Spasms assessed. Catheter was removed. Stress urinary incontinence was assessed.    Results for POC orders placed in visit on 03/26/24   POCT Urinalysis no Micro   Result Value Ref Range    Color, UA yellow     Clarity, UA clear     Glucose, UA POC neg     Bilirubin, UA neg     Ketones, UA neg     Spec Grav, UA 1.020     Blood, UA POC neg     pH, UA 6.5     Protein, UA POC neg     Urobilinogen, UA 0.2     Leukocytes, UA neg     Nitrite, UA neg        Cystometrogram   WBC Urine, POC   Date Value Ref Range Status   03/26/2024 neg  Final     RBC Urine, POC   Date Value Ref Range Status   03/26/2024 neg  Final     Nitrate, UA POC   Date Value Ref Range Status   03/26/2024 neg  Final     post void residual   Date Value Ref Range Status   03/26/2024 40 ml Final     FIRST SENSATION   Date Value Ref Range Status   03/26/2024 10 cc Final     SECOND SENSATION   Date Value Ref

## 2024-04-22 ENCOUNTER — PROCEDURE VISIT (OUTPATIENT)
Dept: UROGYNECOLOGY | Age: 49
End: 2024-04-22
Payer: COMMERCIAL

## 2024-04-22 DIAGNOSIS — R35.0 URINARY FREQUENCY: ICD-10-CM

## 2024-04-22 DIAGNOSIS — N81.6 RECTOCELE: ICD-10-CM

## 2024-04-22 DIAGNOSIS — R39.15 URINARY URGENCY: ICD-10-CM

## 2024-04-22 DIAGNOSIS — N81.11 CYSTOCELE, MIDLINE: Primary | ICD-10-CM

## 2024-04-22 DIAGNOSIS — N81.4 UTERINE PROLAPSE: ICD-10-CM

## 2024-04-22 DIAGNOSIS — N39.3 STRESS INCONTINENCE: ICD-10-CM

## 2024-04-22 PROCEDURE — 51784 ANAL/URINARY MUSCLE STUDY: CPT | Performed by: OBSTETRICS & GYNECOLOGY

## 2024-04-22 PROCEDURE — 51741 ELECTRO-UROFLOWMETRY FIRST: CPT | Performed by: OBSTETRICS & GYNECOLOGY

## 2024-04-22 PROCEDURE — 51797 INTRAABDOMINAL PRESSURE TEST: CPT | Performed by: OBSTETRICS & GYNECOLOGY

## 2024-04-22 PROCEDURE — 51729 CYSTOMETROGRAM W/VP&UP: CPT | Performed by: OBSTETRICS & GYNECOLOGY

## 2024-04-22 NOTE — PROGRESS NOTES
Urodynamic Procedure Note    Christina Vinsonerback  1975    Urodynamicist: Dr. Garvin    Equipment: Laborie    Brief History/Indication:    Patient is a 49 y.o. female with subjective complaints of stress incontinence and prolapse and frequency for a urodynamic evaluation.    Cystometrogram   WBC Urine, POC   Date Value Ref Range Status   03/26/2024 neg  Final     RBC Urine, POC   Date Value Ref Range Status   03/26/2024 neg  Final     Nitrate, UA POC   Date Value Ref Range Status   03/26/2024 neg  Final     post void residual   Date Value Ref Range Status   03/26/2024 40 ml Final     FIRST SENSATION   Date Value Ref Range Status   03/26/2024 10 cc Final     SECOND SENSATION   Date Value Ref Range Status   03/26/2024 150 cc Final     MAX SENSATION   Date Value Ref Range Status   03/26/2024 225 cc Final     EMPTY COUGH STRESS TEST   Date Value Ref Range Status   03/26/2024 neg  Final     FULL COUGH STRESS TEST   Date Value Ref Range Status   03/26/2024 neg  Final     SPASM   Date Value Ref Range Status   03/26/2024 neg  Final       Pelvic Organ Prolapse Quantification  Anterior Wall (Aa): +1   Anterior Wall (Ba): +1   Cervix or Cuff (C): -1     Genital Hiatus (gh): 5   Perineal Body (pb): 3   Total Vaginal Length (tvl): 8     Posterior Wall (Ap): +1   Posterior Wall (Bp): +1   Posterior Fornix (D): -1     Oxford Scale Muscle Strength: 0          Procedure:  The Patient was taken to the Urodynamics suite and a free urine flow was obtained followed by catheterization for residual urine. A double-lumen urodynamic catheter was introduced to the bladder for measuring bladder pressure, and for filling. EMG patch electrodes were placed perianally for recording activity of the anal sphincter. A vaginal catheter was inserted to record the abdominal pressure. The entire process was displayed and analyzed using the [employer] Urodynamic machine and software.    Findings:   No results found for this visit on

## 2024-05-08 ENCOUNTER — PROCEDURE VISIT (OUTPATIENT)
Dept: UROGYNECOLOGY | Age: 49
End: 2024-05-08
Payer: COMMERCIAL

## 2024-05-08 VITALS
HEART RATE: 107 BPM | OXYGEN SATURATION: 99 % | TEMPERATURE: 98.3 F | DIASTOLIC BLOOD PRESSURE: 92 MMHG | RESPIRATION RATE: 16 BRPM | SYSTOLIC BLOOD PRESSURE: 166 MMHG

## 2024-05-08 DIAGNOSIS — N81.11 CYSTOCELE, MIDLINE: Primary | ICD-10-CM

## 2024-05-08 DIAGNOSIS — N81.4 UTERINE PROLAPSE: ICD-10-CM

## 2024-05-08 DIAGNOSIS — N81.6 RECTOCELE: ICD-10-CM

## 2024-05-08 DIAGNOSIS — R39.15 URINARY URGENCY: ICD-10-CM

## 2024-05-08 DIAGNOSIS — R35.0 URINARY FREQUENCY: ICD-10-CM

## 2024-05-08 PROCEDURE — 52285 CYSTOSCOPY AND TREATMENT: CPT | Performed by: OBSTETRICS & GYNECOLOGY

## 2024-05-08 RX ORDER — SODIUM CHLORIDE 9 MG/ML
INJECTION, SOLUTION INTRAVENOUS PRN
OUTPATIENT
Start: 2024-05-08

## 2024-05-08 RX ORDER — SODIUM CHLORIDE, SODIUM LACTATE, POTASSIUM CHLORIDE, CALCIUM CHLORIDE 600; 310; 30; 20 MG/100ML; MG/100ML; MG/100ML; MG/100ML
INJECTION, SOLUTION INTRAVENOUS CONTINUOUS
OUTPATIENT
Start: 2024-05-08

## 2024-05-08 RX ORDER — SODIUM CHLORIDE 0.9 % (FLUSH) 0.9 %
5-40 SYRINGE (ML) INJECTION PRN
OUTPATIENT
Start: 2024-05-08

## 2024-05-08 RX ORDER — SODIUM CHLORIDE 0.9 % (FLUSH) 0.9 %
5-40 SYRINGE (ML) INJECTION EVERY 12 HOURS SCHEDULED
OUTPATIENT
Start: 2024-05-08

## 2024-05-08 RX ORDER — LIDOCAINE HYDROCHLORIDE 20 MG/ML
JELLY TOPICAL ONCE
Status: COMPLETED | OUTPATIENT
Start: 2024-05-08 | End: 2024-05-08

## 2024-05-08 RX ADMIN — LIDOCAINE HYDROCHLORIDE: 20 JELLY TOPICAL at 16:30

## 2024-05-08 NOTE — PROGRESS NOTES
2024     HPI:     Name: Christina Thomas  YOB: 1975    CC: Christina Thomas is a 49 y.o. female presenting for an evaluation of prolapse.  HPI: How long have you had this problem?  months  Please rate the severity of your problem: moderate  Anything make it better? Patients presents for surgical consultation.     Ob/Gyn History:    OB History    Para Term  AB Living   6 4 4   2 4   SAB IAB Ectopic Molar Multiple Live Births   2         4      # Outcome Date GA Lbr Shalom/2nd Weight Sex Delivery Anes PTL Lv   6 Term 13 40w2d 03:43 2.603 kg (5 lb 11.8 oz) F Vag-Spont      5 Term 11 40w3d 04:00  F Vag-Spont      4 SAB            3 SAB            2 Term            1 Term              Past Medical History:   Past Medical History:   Diagnosis Date    Anemia     iron supplement    Headache(784.0)     Herpes simplex without mention of complication     last outbreak 2 months ago      Hypothyroidism      Past Surgical History:   Past Surgical History:   Procedure Laterality Date    ACHILLES TENDON SURGERY Right 2020    MODIFIED BROSTROM PROCEDURE RIGHT ANKLE, TENOSYNOVECTOMY  POSTERIOR TIBIAL TENDON RIGHT, APPLICATION POSTERIOR  SPLINT RIGHT performed by Maura Miller DPM at [facility] OR    CHOLECYSTECTOMY, LAPAROSCOPIC      DILATION AND CURETTAGE OF UTERUS  2009    FOOT SURGERY Left 2/10/2022    TENOSYNOVECTOMY POSTERIOR TIBIAL TENDON  , PLANTAR ENDOSCOPIC FASCIOTOMY LEFT FOOT, APPLICATION POSTERIOR SPLINT LEFT FOOT performed by Maura Miller DPM [facility]    HERNIA REPAIR      KNEE SURGERY      rt knee surgery     Current Medications:  Current Outpatient Medications   Medication Sig Dispense Refill    Ubrogepant (UBRELVY) 50 MG TABS Take 50 mg by mouth daily as needed (Migraine) ; if symptoms persist or return, may repeat dose after after 2 hours. Maximum: 200 mg per 24 hours 30 tablet 11    levothyroxine (SYNTHROID) 75 MCG tablet Take

## 2024-05-09 ENCOUNTER — PREP FOR PROCEDURE (OUTPATIENT)
Dept: UROGYNECOLOGY | Age: 49
End: 2024-05-09

## 2024-05-09 DIAGNOSIS — N81.11 CYSTOCELE, MIDLINE: ICD-10-CM

## 2024-05-09 DIAGNOSIS — N81.6 RECTOCELE: ICD-10-CM

## 2024-05-09 DIAGNOSIS — N81.3 COMPLETE UTERINE PROLAPSE: ICD-10-CM

## 2024-06-11 ENCOUNTER — OFFICE VISIT (OUTPATIENT)
Dept: FAMILY MEDICINE CLINIC | Age: 49
End: 2024-06-11

## 2024-06-11 VITALS
SYSTOLIC BLOOD PRESSURE: 116 MMHG | WEIGHT: 175 LBS | OXYGEN SATURATION: 99 % | BODY MASS INDEX: 30.04 KG/M2 | HEART RATE: 73 BPM | DIASTOLIC BLOOD PRESSURE: 80 MMHG

## 2024-06-11 DIAGNOSIS — E03.9 ACQUIRED HYPOTHYROIDISM: ICD-10-CM

## 2024-06-11 DIAGNOSIS — Z01.818 PREOP EXAMINATION: Primary | ICD-10-CM

## 2024-06-11 DIAGNOSIS — E78.2 MIXED HYPERLIPIDEMIA: ICD-10-CM

## 2024-06-11 DIAGNOSIS — N81.11 CYSTOCELE, MIDLINE: ICD-10-CM

## 2024-06-12 LAB
ALBUMIN SERPL-MCNC: 4.6 G/DL (ref 3.4–5)
ALBUMIN/GLOB SERPL: 1.9 {RATIO} (ref 1.1–2.2)
ALP SERPL-CCNC: 90 U/L (ref 40–129)
ALT SERPL-CCNC: 16 U/L (ref 10–40)
ANION GAP SERPL CALCULATED.3IONS-SCNC: 13 MMOL/L (ref 3–16)
AST SERPL-CCNC: 21 U/L (ref 15–37)
BASOPHILS # BLD: 0 K/UL (ref 0–0.2)
BASOPHILS NFR BLD: 0.4 %
BILIRUB SERPL-MCNC: <0.2 MG/DL (ref 0–1)
BUN SERPL-MCNC: 13 MG/DL (ref 7–20)
CALCIUM SERPL-MCNC: 9.2 MG/DL (ref 8.3–10.6)
CHLORIDE SERPL-SCNC: 104 MMOL/L (ref 99–110)
CHOLEST SERPL-MCNC: 257 MG/DL (ref 0–199)
CO2 SERPL-SCNC: 22 MMOL/L (ref 21–32)
CREAT SERPL-MCNC: 0.6 MG/DL (ref 0.6–1.1)
DEPRECATED RDW RBC AUTO: 12.9 % (ref 12.4–15.4)
EOSINOPHIL # BLD: 0.1 K/UL (ref 0–0.6)
EOSINOPHIL NFR BLD: 2.1 %
GFR SERPLBLD CREATININE-BSD FMLA CKD-EPI: >90 ML/MIN/{1.73_M2}
GLUCOSE SERPL-MCNC: 92 MG/DL (ref 70–99)
HCT VFR BLD AUTO: 40.9 % (ref 36–48)
HDLC SERPL-MCNC: 54 MG/DL (ref 40–60)
HGB BLD-MCNC: 13.9 G/DL (ref 12–16)
LDLC SERPL CALC-MCNC: 164 MG/DL
LYMPHOCYTES # BLD: 1.6 K/UL (ref 1–5.1)
LYMPHOCYTES NFR BLD: 23.5 %
MCH RBC QN AUTO: 31.2 PG (ref 26–34)
MCHC RBC AUTO-ENTMCNC: 33.9 G/DL (ref 31–36)
MCV RBC AUTO: 92 FL (ref 80–100)
MONOCYTES # BLD: 0.6 K/UL (ref 0–1.3)
MONOCYTES NFR BLD: 8.1 %
NEUTROPHILS # BLD: 4.5 K/UL (ref 1.7–7.7)
NEUTROPHILS NFR BLD: 65.9 %
PLATELET # BLD AUTO: 269 K/UL (ref 135–450)
PMV BLD AUTO: 9 FL (ref 5–10.5)
POTASSIUM SERPL-SCNC: 3.7 MMOL/L (ref 3.5–5.1)
PROT SERPL-MCNC: 7 G/DL (ref 6.4–8.2)
RBC # BLD AUTO: 4.45 M/UL (ref 4–5.2)
SODIUM SERPL-SCNC: 139 MMOL/L (ref 136–145)
TRIGL SERPL-MCNC: 197 MG/DL (ref 0–150)
TSH SERPL DL<=0.005 MIU/L-ACNC: 3.16 UIU/ML (ref 0.27–4.2)
VLDLC SERPL CALC-MCNC: 39 MG/DL
WBC # BLD AUTO: 6.8 K/UL (ref 4–11)

## 2024-06-13 ENCOUNTER — TELEPHONE (OUTPATIENT)
Dept: UROGYNECOLOGY | Age: 49
End: 2024-06-13

## 2024-06-13 NOTE — TELEPHONE ENCOUNTER
Surgery scheduled for 6/27//24.    Called patient and allowed time for any additional questions prior to surgery.       Advised to stop all vitamins, herbal supplements, Aspirin, or NSAIDS the week prior to surgery.    Medications to hold morning of surgery: maxalt and ubrelvy .      Confirmed date and time of surgery with patient as well as post op appointment.      Patient asked if [facility] paperwork was completed and sent to [employer] and let patient know it appears her paperwork was sent on May 23rd. Patient verbalized understanding.    Answered all questions patient had in regards to upcoming surgery - Asked patient to call office if there are any additional questions.

## 2024-06-19 ENCOUNTER — TELEPHONE (OUTPATIENT)
Dept: UROGYNECOLOGY | Age: 49
End: 2024-06-19

## 2024-06-19 NOTE — TELEPHONE ENCOUNTER
Patients Additional [facility] paperwork is complete. This RN noticed that the patient portion of form, Authorization for Release and Disclosure of Health Related Information, was not signed by patient.    Per patient, she has already signed this form and submitted it to [employer].    Informed patient that this form will be faxed at conclusion of phone call.     Paperwork successfully faxed. Patient verbalizes understanding of all of the above, and has no further questions or concerns at this time. Encouraged to call back the office should any questions/concerns arise. 6/19/2024 at 12:18 PM.

## 2024-06-20 RX ORDER — ACETAMINOPHEN 500 MG
500 TABLET ORAL EVERY 6 HOURS PRN
COMMUNITY

## 2024-06-20 NOTE — PROGRESS NOTES
6/20/2024 1138 AM:        [facility] PRE-SURGICAL TESTING INSTRUCTIONS                      PRIOR TO PROCEDURE DATE:    1. PLEASE FOLLOW ANY INSTRUCTIONS GIVEN TO YOU PER YOUR SURGEON.      2. Arrange for someone to drive you home and be with you for the first 24 hours after discharge for your safety after your procedure for which you received sedation. Ensure it is someone we can share information with regarding your discharge.     NOTE: At this time ONLY 2 ADULTS may accompany you. NO CHILDREN UNDER AGE OF 16.    One person ENCOURAGED to stay at hospital entire time if outpatient surgery      3. You must contact your surgeon for instructions IF:  You are taking any blood thinners, aspirin, anti-inflammatory or vitamins.   Contact your ordering physician/surgeon for medication instructions as soon as possible, especially if taking blood thinners, aspirin, heart, or diabetic medication. STOP SUPPLEMENTS/VITAMINS/NON-STEROIDAL ANTI-INFLAMMATORY MEDICATION 7 DAYS PRIOR TO PROCEDURE.  There is a change in your physical condition such as a cold, fever, rash, cuts, sores, or any other infection, especially near your surgical site.    4. Do not drink alcohol the day before or day of your procedure.  Do not use any recreational marijuana at least 24 hours or street drugs (heroin, cocaine) at minimum 5 days prior to your procedure.     5. A Pre-Surgical History and Physical MUST be completed WITHIN 30 DAYS OR LESS prior to your procedure.by your Physician or an Urgent Care        THE DAY OF YOUR PROCEDURE:  1.  Follow instructions for ARRIVAL TIME as DIRECTED BY YOUR SURGEON. [address] [address]     2. Enter the MAIN entrance from [facility] and follow the signs to the free Parking Garage or  Parking (offered free of charge 7 am-5pm).      3. Enter the Main Entrance of the hospital (do not enter from the lower level of the parking garage). Upon entrance, check in with

## 2024-06-26 ENCOUNTER — ANESTHESIA EVENT (OUTPATIENT)
Dept: OPERATING ROOM | Age: 49
End: 2024-06-26
Payer: COMMERCIAL

## 2024-06-27 ENCOUNTER — ANESTHESIA (OUTPATIENT)
Dept: OPERATING ROOM | Age: 49
End: 2024-06-27
Payer: COMMERCIAL

## 2024-06-27 ENCOUNTER — HOSPITAL ENCOUNTER (OUTPATIENT)
Age: 49
Setting detail: OBSERVATION
Discharge: HOME | End: 2024-06-28
Attending: OBSTETRICS & GYNECOLOGY | Admitting: OBSTETRICS & GYNECOLOGY
Payer: COMMERCIAL

## 2024-06-27 DIAGNOSIS — N81.3 COMPLETE UTERINE PROLAPSE: ICD-10-CM

## 2024-06-27 DIAGNOSIS — G89.18 POST-OP PAIN: Primary | ICD-10-CM

## 2024-06-27 DIAGNOSIS — N81.11 CYSTOCELE, MIDLINE: ICD-10-CM

## 2024-06-27 DIAGNOSIS — N81.6 RECTOCELE: ICD-10-CM

## 2024-06-27 PROBLEM — N81.4 CYSTOCELE WITH UTERINE PROLAPSE: Status: ACTIVE | Noted: 2024-06-27

## 2024-06-27 LAB — HCG UR QL: NEGATIVE

## 2024-06-27 PROCEDURE — 6370000000 HC RX 637 (ALT 250 FOR IP): Performed by: OBSTETRICS & GYNECOLOGY

## 2024-06-27 PROCEDURE — 3700000000 HC ANESTHESIA ATTENDED CARE: Performed by: OBSTETRICS & GYNECOLOGY

## 2024-06-27 PROCEDURE — 2500000003 HC RX 250 WO HCPCS: Performed by: OBSTETRICS & GYNECOLOGY

## 2024-06-27 PROCEDURE — 6360000002 HC RX W HCPCS

## 2024-06-27 PROCEDURE — 6360000002 HC RX W HCPCS: Performed by: OBSTETRICS & GYNECOLOGY

## 2024-06-27 PROCEDURE — 7100000001 HC PACU RECOVERY - ADDTL 15 MIN: Performed by: OBSTETRICS & GYNECOLOGY

## 2024-06-27 PROCEDURE — 57260 CMBN ANT PST COLPRHY: CPT | Performed by: OBSTETRICS & GYNECOLOGY

## 2024-06-27 PROCEDURE — 2709999900 HC NON-CHARGEABLE SUPPLY: Performed by: OBSTETRICS & GYNECOLOGY

## 2024-06-27 PROCEDURE — G0378 HOSPITAL OBSERVATION PER HR: HCPCS

## 2024-06-27 PROCEDURE — 57283 COLPOPEXY INTRAPERITONEAL: CPT | Performed by: OBSTETRICS & GYNECOLOGY

## 2024-06-27 PROCEDURE — 7100000000 HC PACU RECOVERY - FIRST 15 MIN: Performed by: OBSTETRICS & GYNECOLOGY

## 2024-06-27 PROCEDURE — 2720000010 HC SURG SUPPLY STERILE: Performed by: OBSTETRICS & GYNECOLOGY

## 2024-06-27 PROCEDURE — 6360000002 HC RX W HCPCS: Performed by: ANESTHESIOLOGY

## 2024-06-27 PROCEDURE — 2580000003 HC RX 258: Performed by: OBSTETRICS & GYNECOLOGY

## 2024-06-27 PROCEDURE — 58262 VAG HYST INCLUDING T/O: CPT | Performed by: OBSTETRICS & GYNECOLOGY

## 2024-06-27 PROCEDURE — 3700000001 HC ADD 15 MINUTES (ANESTHESIA): Performed by: OBSTETRICS & GYNECOLOGY

## 2024-06-27 PROCEDURE — A4217 STERILE WATER/SALINE, 500 ML: HCPCS | Performed by: OBSTETRICS & GYNECOLOGY

## 2024-06-27 PROCEDURE — 84703 CHORIONIC GONADOTROPIN ASSAY: CPT

## 2024-06-27 PROCEDURE — 3600000004 HC SURGERY LEVEL 4 BASE: Performed by: OBSTETRICS & GYNECOLOGY

## 2024-06-27 PROCEDURE — 2500000003 HC RX 250 WO HCPCS

## 2024-06-27 PROCEDURE — 6370000000 HC RX 637 (ALT 250 FOR IP)

## 2024-06-27 PROCEDURE — 3600000014 HC SURGERY LEVEL 4 ADDTL 15MIN: Performed by: OBSTETRICS & GYNECOLOGY

## 2024-06-27 PROCEDURE — 88305 TISSUE EXAM BY PATHOLOGIST: CPT

## 2024-06-27 RX ORDER — APREPITANT 40 MG/1
80 CAPSULE ORAL ONCE
Status: COMPLETED | OUTPATIENT
Start: 2024-06-27 | End: 2024-06-27

## 2024-06-27 RX ORDER — DIPHENHYDRAMINE HYDROCHLORIDE 50 MG/ML
INJECTION INTRAMUSCULAR; INTRAVENOUS PRN
Status: DISCONTINUED | OUTPATIENT
Start: 2024-06-27 | End: 2024-06-27 | Stop reason: SDUPTHER

## 2024-06-27 RX ORDER — FLUTICASONE PROPIONATE 50 MCG
1 SPRAY, SUSPENSION (ML) NASAL DAILY
Status: DISCONTINUED | OUTPATIENT
Start: 2024-06-27 | End: 2024-06-28 | Stop reason: HOSPADM

## 2024-06-27 RX ORDER — SODIUM CHLORIDE 9 MG/ML
INJECTION, SOLUTION INTRAVENOUS PRN
Status: DISCONTINUED | OUTPATIENT
Start: 2024-06-27 | End: 2024-06-27 | Stop reason: HOSPADM

## 2024-06-27 RX ORDER — LEVOTHYROXINE SODIUM 0.07 MG/1
75 TABLET ORAL
Status: DISCONTINUED | OUTPATIENT
Start: 2024-06-28 | End: 2024-06-28 | Stop reason: HOSPADM

## 2024-06-27 RX ORDER — LIDOCAINE HYDROCHLORIDE 20 MG/ML
INJECTION, SOLUTION INTRAVENOUS PRN
Status: DISCONTINUED | OUTPATIENT
Start: 2024-06-27 | End: 2024-06-27 | Stop reason: SDUPTHER

## 2024-06-27 RX ORDER — SODIUM CHLORIDE 0.9 % (FLUSH) 0.9 %
5-40 SYRINGE (ML) INJECTION PRN
Status: DISCONTINUED | OUTPATIENT
Start: 2024-06-27 | End: 2024-06-27 | Stop reason: HOSPADM

## 2024-06-27 RX ORDER — SODIUM CHLORIDE 0.9 % (FLUSH) 0.9 %
5-40 SYRINGE (ML) INJECTION EVERY 12 HOURS SCHEDULED
Status: DISCONTINUED | OUTPATIENT
Start: 2024-06-27 | End: 2024-06-27 | Stop reason: HOSPADM

## 2024-06-27 RX ORDER — ONDANSETRON 2 MG/ML
4 INJECTION INTRAMUSCULAR; INTRAVENOUS EVERY 6 HOURS PRN
Status: DISCONTINUED | OUTPATIENT
Start: 2024-06-27 | End: 2024-06-28 | Stop reason: HOSPADM

## 2024-06-27 RX ORDER — IBUPROFEN 200 MG
800 TABLET ORAL EVERY 8 HOURS
Status: DISCONTINUED | OUTPATIENT
Start: 2024-06-28 | End: 2024-06-28 | Stop reason: HOSPADM

## 2024-06-27 RX ORDER — SUCCINYLCHOLINE/SOD CL,ISO/PF 200MG/10ML
SYRINGE (ML) INTRAVENOUS PRN
Status: DISCONTINUED | OUTPATIENT
Start: 2024-06-27 | End: 2024-06-27 | Stop reason: SDUPTHER

## 2024-06-27 RX ORDER — SIMETHICONE 80 MG
160 TABLET,CHEWABLE ORAL 4 TIMES DAILY PRN
Status: DISCONTINUED | OUTPATIENT
Start: 2024-06-27 | End: 2024-06-28 | Stop reason: HOSPADM

## 2024-06-27 RX ORDER — SCOLOPAMINE TRANSDERMAL SYSTEM 1 MG/1
PATCH, EXTENDED RELEASE TRANSDERMAL
Status: DISCONTINUED
Start: 2024-06-27 | End: 2024-06-28 | Stop reason: HOSPADM

## 2024-06-27 RX ORDER — SODIUM CHLORIDE 9 MG/ML
INJECTION, SOLUTION INTRAVENOUS CONTINUOUS
Status: DISCONTINUED | OUTPATIENT
Start: 2024-06-27 | End: 2024-06-28 | Stop reason: HOSPADM

## 2024-06-27 RX ORDER — SODIUM CHLORIDE, SODIUM LACTATE, POTASSIUM CHLORIDE, CALCIUM CHLORIDE 600; 310; 30; 20 MG/100ML; MG/100ML; MG/100ML; MG/100ML
INJECTION, SOLUTION INTRAVENOUS CONTINUOUS
Status: DISCONTINUED | OUTPATIENT
Start: 2024-06-27 | End: 2024-06-27 | Stop reason: HOSPADM

## 2024-06-27 RX ORDER — PROPOFOL 10 MG/ML
INJECTION, EMULSION INTRAVENOUS PRN
Status: DISCONTINUED | OUTPATIENT
Start: 2024-06-27 | End: 2024-06-27 | Stop reason: SDUPTHER

## 2024-06-27 RX ORDER — SODIUM CHLORIDE 9 MG/ML
INJECTION, SOLUTION INTRAVENOUS PRN
Status: DISCONTINUED | OUTPATIENT
Start: 2024-06-27 | End: 2024-06-28 | Stop reason: HOSPADM

## 2024-06-27 RX ORDER — ONDANSETRON 2 MG/ML
INJECTION INTRAMUSCULAR; INTRAVENOUS PRN
Status: DISCONTINUED | OUTPATIENT
Start: 2024-06-27 | End: 2024-06-27 | Stop reason: SDUPTHER

## 2024-06-27 RX ORDER — SCOLOPAMINE TRANSDERMAL SYSTEM 1 MG/1
1 PATCH, EXTENDED RELEASE TRANSDERMAL ONCE
Status: DISCONTINUED | OUTPATIENT
Start: 2024-06-27 | End: 2024-06-28 | Stop reason: HOSPADM

## 2024-06-27 RX ORDER — SODIUM CHLORIDE 0.9 % (FLUSH) 0.9 %
5-40 SYRINGE (ML) INJECTION EVERY 12 HOURS SCHEDULED
Status: DISCONTINUED | OUTPATIENT
Start: 2024-06-27 | End: 2024-06-28 | Stop reason: HOSPADM

## 2024-06-27 RX ORDER — MIDAZOLAM HYDROCHLORIDE 1 MG/ML
INJECTION INTRAMUSCULAR; INTRAVENOUS PRN
Status: DISCONTINUED | OUTPATIENT
Start: 2024-06-27 | End: 2024-06-27 | Stop reason: SDUPTHER

## 2024-06-27 RX ORDER — METOCLOPRAMIDE HYDROCHLORIDE 5 MG/ML
10 INJECTION INTRAMUSCULAR; INTRAVENOUS
Status: DISCONTINUED | OUTPATIENT
Start: 2024-06-27 | End: 2024-06-27 | Stop reason: HOSPADM

## 2024-06-27 RX ORDER — HYDROMORPHONE HYDROCHLORIDE 1 MG/ML
0.25 INJECTION, SOLUTION INTRAMUSCULAR; INTRAVENOUS; SUBCUTANEOUS EVERY 5 MIN PRN
Status: COMPLETED | OUTPATIENT
Start: 2024-06-27 | End: 2024-06-27

## 2024-06-27 RX ORDER — HYDROMORPHONE HYDROCHLORIDE 1 MG/ML
0.5 INJECTION, SOLUTION INTRAMUSCULAR; INTRAVENOUS; SUBCUTANEOUS
Status: DISCONTINUED | OUTPATIENT
Start: 2024-06-27 | End: 2024-06-28 | Stop reason: HOSPADM

## 2024-06-27 RX ORDER — LABETALOL HYDROCHLORIDE 5 MG/ML
10 INJECTION, SOLUTION INTRAVENOUS
Status: DISCONTINUED | OUTPATIENT
Start: 2024-06-27 | End: 2024-06-27 | Stop reason: HOSPADM

## 2024-06-27 RX ORDER — ROCURONIUM BROMIDE 10 MG/ML
INJECTION, SOLUTION INTRAVENOUS PRN
Status: DISCONTINUED | OUTPATIENT
Start: 2024-06-27 | End: 2024-06-27 | Stop reason: SDUPTHER

## 2024-06-27 RX ORDER — HYDROMORPHONE HYDROCHLORIDE 2 MG/ML
INJECTION, SOLUTION INTRAMUSCULAR; INTRAVENOUS; SUBCUTANEOUS PRN
Status: DISCONTINUED | OUTPATIENT
Start: 2024-06-27 | End: 2024-06-27 | Stop reason: SDUPTHER

## 2024-06-27 RX ORDER — ACETAMINOPHEN 500 MG
1000 TABLET ORAL EVERY 8 HOURS SCHEDULED
Status: DISCONTINUED | OUTPATIENT
Start: 2024-06-27 | End: 2024-06-28 | Stop reason: HOSPADM

## 2024-06-27 RX ORDER — KETOROLAC TROMETHAMINE 30 MG/ML
30 INJECTION, SOLUTION INTRAMUSCULAR; INTRAVENOUS EVERY 6 HOURS
Status: COMPLETED | OUTPATIENT
Start: 2024-06-27 | End: 2024-06-28

## 2024-06-27 RX ORDER — PROMETHAZINE HYDROCHLORIDE 25 MG/1
12.5 TABLET ORAL EVERY 6 HOURS PRN
Status: DISCONTINUED | OUTPATIENT
Start: 2024-06-27 | End: 2024-06-28 | Stop reason: HOSPADM

## 2024-06-27 RX ORDER — LIDOCAINE HYDROCHLORIDE AND EPINEPHRINE 10; 10 MG/ML; UG/ML
INJECTION, SOLUTION INFILTRATION; PERINEURAL PRN
Status: DISCONTINUED | OUTPATIENT
Start: 2024-06-27 | End: 2024-06-27 | Stop reason: ALTCHOICE

## 2024-06-27 RX ORDER — MIDAZOLAM HYDROCHLORIDE 1 MG/ML
2 INJECTION INTRAMUSCULAR; INTRAVENOUS
Status: DISCONTINUED | OUTPATIENT
Start: 2024-06-27 | End: 2024-06-27 | Stop reason: HOSPADM

## 2024-06-27 RX ORDER — DEXAMETHASONE SODIUM PHOSPHATE 4 MG/ML
INJECTION, SOLUTION INTRA-ARTICULAR; INTRALESIONAL; INTRAMUSCULAR; INTRAVENOUS; SOFT TISSUE PRN
Status: DISCONTINUED | OUTPATIENT
Start: 2024-06-27 | End: 2024-06-27 | Stop reason: SDUPTHER

## 2024-06-27 RX ORDER — HYDROMORPHONE HYDROCHLORIDE 1 MG/ML
0.25 INJECTION, SOLUTION INTRAMUSCULAR; INTRAVENOUS; SUBCUTANEOUS
Status: DISCONTINUED | OUTPATIENT
Start: 2024-06-27 | End: 2024-06-28 | Stop reason: HOSPADM

## 2024-06-27 RX ORDER — GLYCOPYRROLATE 0.2 MG/ML
INJECTION INTRAMUSCULAR; INTRAVENOUS PRN
Status: DISCONTINUED | OUTPATIENT
Start: 2024-06-27 | End: 2024-06-27 | Stop reason: SDUPTHER

## 2024-06-27 RX ORDER — EPHEDRINE SULFATE 50 MG/ML
INJECTION INTRAVENOUS PRN
Status: DISCONTINUED | OUTPATIENT
Start: 2024-06-27 | End: 2024-06-27 | Stop reason: SDUPTHER

## 2024-06-27 RX ORDER — MAGNESIUM HYDROXIDE 1200 MG/15ML
LIQUID ORAL CONTINUOUS PRN
Status: DISCONTINUED | OUTPATIENT
Start: 2024-06-27 | End: 2024-06-27 | Stop reason: HOSPADM

## 2024-06-27 RX ORDER — FENTANYL CITRATE 50 UG/ML
50 INJECTION, SOLUTION INTRAMUSCULAR; INTRAVENOUS EVERY 5 MIN PRN
Status: DISCONTINUED | OUTPATIENT
Start: 2024-06-27 | End: 2024-06-27 | Stop reason: HOSPADM

## 2024-06-27 RX ORDER — KETOROLAC TROMETHAMINE 30 MG/ML
INJECTION, SOLUTION INTRAMUSCULAR; INTRAVENOUS PRN
Status: DISCONTINUED | OUTPATIENT
Start: 2024-06-27 | End: 2024-06-27 | Stop reason: SDUPTHER

## 2024-06-27 RX ORDER — NALOXONE HYDROCHLORIDE 0.4 MG/ML
INJECTION, SOLUTION INTRAMUSCULAR; INTRAVENOUS; SUBCUTANEOUS PRN
Status: DISCONTINUED | OUTPATIENT
Start: 2024-06-27 | End: 2024-06-27 | Stop reason: HOSPADM

## 2024-06-27 RX ORDER — FENTANYL CITRATE 50 UG/ML
INJECTION, SOLUTION INTRAMUSCULAR; INTRAVENOUS PRN
Status: DISCONTINUED | OUTPATIENT
Start: 2024-06-27 | End: 2024-06-27 | Stop reason: SDUPTHER

## 2024-06-27 RX ORDER — SODIUM CHLORIDE 0.9 % (FLUSH) 0.9 %
5-40 SYRINGE (ML) INJECTION PRN
Status: DISCONTINUED | OUTPATIENT
Start: 2024-06-27 | End: 2024-06-28 | Stop reason: HOSPADM

## 2024-06-27 RX ORDER — ONDANSETRON 2 MG/ML
4 INJECTION INTRAMUSCULAR; INTRAVENOUS
Status: DISCONTINUED | OUTPATIENT
Start: 2024-06-27 | End: 2024-06-27 | Stop reason: HOSPADM

## 2024-06-27 RX ORDER — HYDROMORPHONE HYDROCHLORIDE 1 MG/ML
0.5 INJECTION, SOLUTION INTRAMUSCULAR; INTRAVENOUS; SUBCUTANEOUS ONCE
Status: COMPLETED | OUTPATIENT
Start: 2024-06-27 | End: 2024-06-27

## 2024-06-27 RX ADMIN — HYDROMORPHONE HYDROCHLORIDE 0.25 MG: 1 INJECTION, SOLUTION INTRAMUSCULAR; INTRAVENOUS; SUBCUTANEOUS at 12:15

## 2024-06-27 RX ADMIN — KETOROLAC TROMETHAMINE 30 MG: 30 INJECTION, SOLUTION INTRAMUSCULAR; INTRAVENOUS at 23:38

## 2024-06-27 RX ADMIN — MIDAZOLAM HYDROCHLORIDE 2 MG: 2 INJECTION, SOLUTION INTRAMUSCULAR; INTRAVENOUS at 08:42

## 2024-06-27 RX ADMIN — SUGAMMADEX 200 MG: 100 INJECTION, SOLUTION INTRAVENOUS at 10:46

## 2024-06-27 RX ADMIN — ONDANSETRON 4 MG: 2 INJECTION INTRAMUSCULAR; INTRAVENOUS at 09:01

## 2024-06-27 RX ADMIN — ROCURONIUM BROMIDE 40 MG: 10 INJECTION, SOLUTION INTRAVENOUS at 09:02

## 2024-06-27 RX ADMIN — HYDROMORPHONE HYDROCHLORIDE 0.5 MG: 2 INJECTION, SOLUTION INTRAMUSCULAR; INTRAVENOUS; SUBCUTANEOUS at 10:39

## 2024-06-27 RX ADMIN — FENTANYL CITRATE 50 MCG: 50 INJECTION, SOLUTION INTRAMUSCULAR; INTRAVENOUS at 08:50

## 2024-06-27 RX ADMIN — FENTANYL CITRATE 50 MCG: 50 INJECTION INTRAMUSCULAR; INTRAVENOUS at 11:46

## 2024-06-27 RX ADMIN — HYDROMORPHONE HYDROCHLORIDE 0.5 MG: 1 INJECTION, SOLUTION INTRAMUSCULAR; INTRAVENOUS; SUBCUTANEOUS at 22:02

## 2024-06-27 RX ADMIN — SODIUM CHLORIDE: 9 INJECTION, SOLUTION INTRAVENOUS at 21:59

## 2024-06-27 RX ADMIN — DEXAMETHASONE SODIUM PHOSPHATE 8 MG: 4 INJECTION INTRA-ARTICULAR; INTRALESIONAL; INTRAMUSCULAR; INTRAVENOUS; SOFT TISSUE at 09:01

## 2024-06-27 RX ADMIN — DIPHENHYDRAMINE HYDROCHLORIDE 12.5 MG: 50 INJECTION, SOLUTION INTRAMUSCULAR; INTRAVENOUS at 09:26

## 2024-06-27 RX ADMIN — KETOROLAC TROMETHAMINE 30 MG: 30 INJECTION, SOLUTION INTRAMUSCULAR; INTRAVENOUS at 17:05

## 2024-06-27 RX ADMIN — FENTANYL CITRATE 50 MCG: 50 INJECTION, SOLUTION INTRAMUSCULAR; INTRAVENOUS at 09:57

## 2024-06-27 RX ADMIN — ACETAMINOPHEN 1000 MG: 500 TABLET ORAL at 20:21

## 2024-06-27 RX ADMIN — HYDROMORPHONE HYDROCHLORIDE 0.5 MG: 1 INJECTION, SOLUTION INTRAMUSCULAR; INTRAVENOUS; SUBCUTANEOUS at 21:27

## 2024-06-27 RX ADMIN — ACETAMINOPHEN 1000 MG: 500 TABLET ORAL at 13:39

## 2024-06-27 RX ADMIN — WATER 2000 MG: 1 INJECTION INTRAMUSCULAR; INTRAVENOUS; SUBCUTANEOUS at 08:50

## 2024-06-27 RX ADMIN — SODIUM CHLORIDE: 9 INJECTION, SOLUTION INTRAVENOUS at 13:42

## 2024-06-27 RX ADMIN — APREPITANT 80 MG: 40 CAPSULE ORAL at 08:07

## 2024-06-27 RX ADMIN — LIDOCAINE HYDROCHLORIDE 80 MG: 20 INJECTION, SOLUTION INTRAVENOUS at 08:55

## 2024-06-27 RX ADMIN — HYDROMORPHONE HYDROCHLORIDE 0.25 MG: 1 INJECTION, SOLUTION INTRAMUSCULAR; INTRAVENOUS; SUBCUTANEOUS at 12:10

## 2024-06-27 RX ADMIN — KETOROLAC TROMETHAMINE 30 MG: 30 INJECTION, SOLUTION INTRAMUSCULAR; INTRAVENOUS at 10:46

## 2024-06-27 RX ADMIN — SODIUM CHLORIDE, POTASSIUM CHLORIDE, SODIUM LACTATE AND CALCIUM CHLORIDE: 600; 310; 30; 20 INJECTION, SOLUTION INTRAVENOUS at 07:49

## 2024-06-27 RX ADMIN — ROCURONIUM BROMIDE 10 MG: 10 INJECTION, SOLUTION INTRAVENOUS at 09:41

## 2024-06-27 RX ADMIN — HYDROMORPHONE HYDROCHLORIDE 0.5 MG: 1 INJECTION, SOLUTION INTRAMUSCULAR; INTRAVENOUS; SUBCUTANEOUS at 15:08

## 2024-06-27 RX ADMIN — HYDROMORPHONE HYDROCHLORIDE 0.5 MG: 2 INJECTION, SOLUTION INTRAMUSCULAR; INTRAVENOUS; SUBCUTANEOUS at 10:08

## 2024-06-27 RX ADMIN — ONDANSETRON 4 MG: 2 INJECTION INTRAMUSCULAR; INTRAVENOUS at 16:41

## 2024-06-27 RX ADMIN — HYDROMORPHONE HYDROCHLORIDE 0.5 MG: 1 INJECTION, SOLUTION INTRAMUSCULAR; INTRAVENOUS; SUBCUTANEOUS at 18:46

## 2024-06-27 RX ADMIN — PROPOFOL 150 MG: 10 INJECTION, EMULSION INTRAVENOUS at 08:56

## 2024-06-27 RX ADMIN — GLYCOPYRROLATE 0.2 MG: 0.2 INJECTION INTRAMUSCULAR; INTRAVENOUS at 09:34

## 2024-06-27 RX ADMIN — HYDROMORPHONE HYDROCHLORIDE 0.5 MG: 2 INJECTION, SOLUTION INTRAMUSCULAR; INTRAVENOUS; SUBCUTANEOUS at 10:53

## 2024-06-27 RX ADMIN — HYDROMORPHONE HYDROCHLORIDE 0.25 MG: 1 INJECTION, SOLUTION INTRAMUSCULAR; INTRAVENOUS; SUBCUTANEOUS at 12:00

## 2024-06-27 RX ADMIN — Medication 140 MG: at 08:57

## 2024-06-27 RX ADMIN — HYDROMORPHONE HYDROCHLORIDE 0.5 MG: 2 INJECTION, SOLUTION INTRAMUSCULAR; INTRAVENOUS; SUBCUTANEOUS at 10:19

## 2024-06-27 RX ADMIN — HYDROMORPHONE HYDROCHLORIDE 0.25 MG: 1 INJECTION, SOLUTION INTRAMUSCULAR; INTRAVENOUS; SUBCUTANEOUS at 12:05

## 2024-06-27 RX ADMIN — EPHEDRINE SULFATE 5 MG: 50 INJECTION INTRAVENOUS at 09:37

## 2024-06-27 ASSESSMENT — PAIN DESCRIPTION - LOCATION
LOCATION: PELVIS
LOCATION: VAGINA
LOCATION: ABDOMEN
LOCATION: PELVIS
LOCATION: ABDOMEN
LOCATION: ABDOMEN
LOCATION: BACK;VAGINA
LOCATION: ABDOMEN;VAGINA
LOCATION: ABDOMEN
LOCATION: PELVIS
LOCATION: ABDOMEN
LOCATION: VAGINA

## 2024-06-27 ASSESSMENT — PAIN SCALES - GENERAL
PAINLEVEL_OUTOF10: 5
PAINLEVEL_OUTOF10: 8
PAINLEVEL_OUTOF10: 8
PAINLEVEL_OUTOF10: 3
PAINLEVEL_OUTOF10: 4
PAINLEVEL_OUTOF10: 4
PAINLEVEL_OUTOF10: 9
PAINLEVEL_OUTOF10: 6
PAINLEVEL_OUTOF10: 9
PAINLEVEL_OUTOF10: 8
PAINLEVEL_OUTOF10: 6
PAINLEVEL_OUTOF10: 6
PAINLEVEL_OUTOF10: 8
PAINLEVEL_OUTOF10: 4
PAINLEVEL_OUTOF10: 4
PAINLEVEL_OUTOF10: 5
PAINLEVEL_OUTOF10: 5

## 2024-06-27 ASSESSMENT — PAIN DESCRIPTION - DESCRIPTORS
DESCRIPTORS: CRAMPING
DESCRIPTORS: PRESSURE
DESCRIPTORS: PRESSURE
DESCRIPTORS: CRAMPING
DESCRIPTORS: PRESSURE
DESCRIPTORS: PRESSURE
DESCRIPTORS: CRAMPING
DESCRIPTORS: PRESSURE
DESCRIPTORS: PRESSURE
DESCRIPTORS: CRAMPING

## 2024-06-27 ASSESSMENT — PAIN DESCRIPTION - ORIENTATION
ORIENTATION: RIGHT;LEFT;MID
ORIENTATION: LOWER;MID
ORIENTATION: RIGHT;LEFT;MID
ORIENTATION: MID;LOWER
ORIENTATION: LOWER;MID
ORIENTATION: RIGHT;LEFT;MID
ORIENTATION: LOWER
ORIENTATION: RIGHT;LEFT;MID
ORIENTATION: LOWER;MID
ORIENTATION: MID;LOWER
ORIENTATION: LOWER
ORIENTATION: MID;LOWER
ORIENTATION: LOWER

## 2024-06-27 ASSESSMENT — PAIN DESCRIPTION - FREQUENCY
FREQUENCY: INTERMITTENT

## 2024-06-27 ASSESSMENT — PAIN - FUNCTIONAL ASSESSMENT
PAIN_FUNCTIONAL_ASSESSMENT: 0-10
PAIN_FUNCTIONAL_ASSESSMENT: ACTIVITIES ARE NOT PREVENTED

## 2024-06-27 ASSESSMENT — PAIN DESCRIPTION - PAIN TYPE
TYPE: SURGICAL PAIN

## 2024-06-27 ASSESSMENT — PAIN DESCRIPTION - ONSET
ONSET: GRADUAL

## 2024-06-27 ASSESSMENT — LIFESTYLE VARIABLES: SMOKING_STATUS: 0

## 2024-06-27 NOTE — PROGRESS NOTES
PACU Transfer Note    Vitals:    06/27/24 1245   BP: 113/79   Pulse: 81   Resp: 14   Temp: 97.7 °F (36.5 °C)   SpO2: 96%       In: 2135 [P.O.:80; I.V.:2055]  Out: 475 [Urine:300]    Pain assessment:    Pain Level: 4 Medicated and satisfied    Report given to Receiving unit IRINA Cao at bedside in PACU.    Transported by Christine, PACU transporter.    6/27/2024 1:03 PM

## 2024-06-27 NOTE — ANESTHESIA POSTPROCEDURE EVALUATION
Department of Anesthesiology  Postprocedure Note    Patient: Christina Thomas  MRN: [id number]  YOB: 1975  Date of evaluation: 6/27/2024    Procedure Summary       Date: 06/27/24 Room / Location: [place] [address] / [place]    Anesthesia Start: 0850 Anesthesia Stop: 1105    Procedure: Vaginal hysterectomy with right salpingectomy, vaginal vault suspension, anterior repair, posterior repair, and cystoscopy (Bilateral) Diagnosis:       Complete uterine prolapse      Cystocele, midline      Rectocele      (Complete uterine prolapse [N81.3])      (Cystocele, midline [N81.11])      (Rectocele [N81.6])    Surgeons: Diony Garvin MD Responsible Provider: Power Burk MD    Anesthesia Type: general ASA Status: 2            Anesthesia Type: No value filed.    Simeon Phase I: Simeon Score: 8    Simeon Phase II:      Anesthesia Post Evaluation    Patient location during evaluation: PACU  Level of consciousness: awake and alert  Airway patency: patent  Nausea & Vomiting: no vomiting  Cardiovascular status: blood pressure returned to baseline  Respiratory status: acceptable  Hydration status: euvolemic  Multimodal analgesia pain management approach  Pain management: adequate    No notable events documented.

## 2024-06-27 NOTE — ANESTHESIA PRE PROCEDURE
(blocks)  Induction: intravenous.    MIPS: Postoperative opioids intended and Prophylactic antiemetics administered.  Anesthetic plan and risks discussed with patient.      Plan discussed with CRNA.                    Power Burk MD   6/27/2024

## 2024-06-27 NOTE — PROGRESS NOTES
Patient admitted to room 5302 from 5303. Patient is A&O x 4. VSS. Patient oriented to the room all safety measures in place. Patient given IS and SCDs at this time. Admission orders released and patient 4 eyes completed. Admission documentation completed. No other needs are noted at this time.    [x] Bed alarm on and cord plugged into wall  [x] Bed in lowest position  [x] Call light and bedside table within reach  [x] Patient educated on all safety measures  []Oxygen connected to wall (if applicable)     Nurse 1 Esignature: Electronically signed by Jorge Trimble RN on 6/27/24 at 1:09 PM EDT  Nurse 2 Esignature: {Esignature:[id number]}

## 2024-06-27 NOTE — PROGRESS NOTES
4 Eyes Admission Assessment     I agree as the admission nurse that 2 RN's have performed a thorough Head to Toe Skin Assessment on the patient. ALL assessment sites listed below have been assessed on admission.       Areas assessed by both nurses: yes  [x]   Head, Face, and Ears   [x]   Shoulders, Back, and Chest  [x]   Arms, Elbows, and Hands   [x]   Coccyx, Sacrum, and Ischium  [x]   Legs, Feet, and Heels        Does the Patient have Skin Breakdown?  No         Enrico Prevention initiated:  Yes   Wound Care Orders initiated:  No      [facility] nurse consulted for Pressure Injury (Stage 3,4, Unstageable, DTI, NWPT, and Complex wounds) or Enrico score 18 or lower:  No      Nurse 1 eSignature: Electronically signed by Kiley Jade RN on 6/27/24 at 7:50 PM EDT    **SHARE this note so that the co-signing nurse is able to place an eSignature**    Nurse 2 eSignature: {Esignature:[id number]}

## 2024-06-27 NOTE — PROGRESS NOTES
Pt to floor form PACU. Vitals stable. Pt reports pain 8/10 in lower abdomen and lower back. Pt denies N/V. Spouse and friend at bedside. No blood in pad. Pt is A+Ox4, lethargic, but responds appropriately to questions. IV patent.

## 2024-06-27 NOTE — PROGRESS NOTES
4 Eyes Skin Assessment     NAME:  Christina Thomas  YOB: 1975  MEDICAL RECORD NUMBER:  [id number]    The patient is being assessed for  Admission    I agree that at least one RN has performed a thorough Head to Toe Skin Assessment on the patient. ALL assessment sites listed below have been assessed.      Areas assessed by both nurses:    Head, Face, Ears, Shoulders, Back, Chest, Arms, Elbows, Hands, Sacrum. Buttock, Coccyx, Ischium, and Legs. Feet and Heels        Does the Patient have a Wound? No noted wound(s)       Enrico Prevention initiated by RN: No  Wound Care Orders initiated by RN: No    Pressure Injury (Stage 3,4, Unstageable, DTI, NWPT, and Complex wounds) if present, place Wound referral order by RN under : No    New Ostomies, if present place, Ostomy referral order under : No     Nurse 1 eSignature: Electronically signed by Jorge Trimble RN on 6/27/24 at 1:08 PM EDT    **SHARE this note so that the co-signing nurse can place an eSignature**    Nurse 2 eSignature: {Esignature:[id number]}

## 2024-06-27 NOTE — BRIEF OP NOTE
Brief Postoperative Note      Patient: Christina Thomas  YOB: 1975  MRN: [id number]    Date of Procedure: 6/27/2024    Pre-Op Diagnosis Codes:     * Complete uterine prolapse [N81.3]     * Cystocele, midline [N81.11]     * Rectocele [N81.6]    Post-Op Diagnosis: Same       Procedure(s):  Vaginal hysterectomy with bilateral salpingectomy, vaginal vault suspension, anterior repair, posterior repair, and cystoscopy  .    Surgeon(s):  Diony Garvin MD    Assistant:  Surgical Assistant: Radha Edmondson  First Assistant: Hoda Herrera    Anesthesia: General    Estimated Blood Loss (mL): 175    Complications: None    Specimens:   ID Type Source Tests Collected by Time Destination   A : CERVIX, UTERUS, RIGHT TUBE Tissue Tissue SURGICAL PATHOLOGY Diony Garvin MD 6/27/2024 1002        Implants:  * No implants in log *      Drains:   Urinary Catheter 06/27/24 2 Way (Active)       Findings:  Infection Present At Time Of Surgery (PATOS) (choose all levels that have infection present):  No infection present  Other Findings: large uterine prolapse, enlarged genital hiatus, cystocele    Electronically signed by DIONY GARVIN MD on 6/27/2024 at 10:44 AM

## 2024-06-27 NOTE — PROGRESS NOTES
Pt had some nausea earlier when given first dose of dilaudid on 5s. Pt given zofran. Pt was instructed to eat something when she takes pain medicine as to avoid N/V. Pt ate pudding shortly after which she tolerated well. Pt has snacked little by little and drank since admission which she had also been tolerating well without N/V.

## 2024-06-27 NOTE — OP NOTE
Operative Note      Patient: Christina Thomas  YOB: 1975  MRN: [id number]    Date of Procedure: 6/27/2024    Pre-Op Diagnosis: Complete uterine prolapse [N81.3]  Cystocele, midline [N81.11]  Rectocele [N81.6]    Post-Op Diagnosis: Same       Procedures: Procedure(s):  Vaginal hysterectomy with bilateral salpingectomy, vaginal vault suspension, anterior repair, posterior repair, and cystoscopy  .     Surgeon: Surgeon(s):  Diony Garvin MD    Anesthesia: General    Assistant: Surgical Assistant: Radha Edmondson  First Assistant: Hoda Herrera    Estimated Blood Loss (mL): 175 mL    IV FLUIDS: 1800 milliliter(s) In: 1000 [I.V.:1000]  Out: 175     URINE OUTPUT: 120 milliters(s)   Output by Drain (mL) 06/25/24 0701 - 06/25/24 1900 06/25/24 1901 - 06/26/24 0700 06/26/24 0701 - 06/26/24 1900 06/26/24 1901 - 06/27/24 0700 06/27/24 0701 - 06/27/24 1046   Requested LDAs do not have output data documented.        Complications: None    Specimens:   ID Type Source Tests Collected by Time Destination   A : CERVIX, UTERUS, RIGHT TUBE Tissue Tissue SURGICAL PATHOLOGY Diony Garvin MD 6/27/2024 1002        Implants: * No implants in log *      Drains:   Urinary Catheter 06/27/24 2 Way (Active)       FINDINGS: Stage 3 prolapse.  Cystourethroscopy with brisk efflux from the bilateral ureters. No trauma or injury to the bladder. Normal appearing tubes and ovaries bilaterally.    INDICATION FOR PROCEDURE: 49 y.o. year old patient who presented with symptomatic pelvic organ prolapse . On exam she had stage 3  prolapse.  She desired to proceed with appropriate surgical repairs.  She was consented to the risks, including bleeding, risk of blood transfusion, infection, injury to surrounding organs such as bowel, bladder, ureters, urethra, the risk of postoperative voiding dysfunction or defecatory dysfunction, risk of nerve injury or re-exploration, the risk of recurrent prolapse or  incontinence,   .  Patient was counseled regarding prophylactic salpingectomy, reviewed benefits and ACOG recommendations to remove if able and doesn't alter surgical approach. The patient understood all of these risks and desired to proceed.    OPERATIVE NOTE:   The patient was taken to the operating room and general anesthesia was found to be adequate.  She was prepped and draped in the normal sterile fashion and placed in Alexi stirrups.   Preoperative antibiotics were administered in the patient holding area.    TOTAL VAGINAL HYSTERECTOMY: The cervix was grasped with single tooth tenacula.  The bladder was drained with a Wells catheter.  Circumferential injection was then performed with 1% lidocaine with epinephrine, and an incision was made with a scalpel.  This was taken down using sharp dissection with Gipson scissors.  The posterior cul-de-sac was entered sharply and a Heany retractor was placed.  The anterior cul-de-sac was then entered sharply and a Heany retractor placed.  The uterosacral ligaments were then clamped bilaterally and suture ligated using 0 Vicryl.  Several more bites were taken bilaterally with the clamp and suture ligated because of the long cervix.  In a successive manner, the uterine arteries and cardinal ligaments were taken down using LigaSure, good hemostasis was noted.  The uterus then needed to be bivalved to facilitate delivery of the fundus.  The ligature was then placed over each cornua and was cauterized and cut.  Uterus and cervix were delivered.   Good hemostasis was noted.  The hysterectomy took extra time because of the long cervix, size of the uterus and additional bleeding.  Several extra sutures were placed to stop bleeding.    RIGHT SALPINGECTOMY: The right fallopian tube was grasped with a farzana clamp and followed out to the fimbria. The tube and mesosalpinx were clamed with ligasure, cauterized and removed. The left tube would not come down and was left

## 2024-06-27 NOTE — PROGRESS NOTES
From OR arousable but very drowsy, with pad and mesh panty in place CDI, with esparza catheter, VSS.    Report from CRNA and OR RN.    S/P Vaginal hysterectomy with right salpingectomy, vaginal vault suspension, anterior repair, posterior repair, and cystoscopy - Bilateral

## 2024-06-27 NOTE — H&P
Date of Surgery Update:  Christina Thomas was seen, history and physical examination reviewed, and patient examined by me today. There have been no significant clinical changes since the completion of the previous history and physical. The surgical site was confirmed by the patient and me.     I have presented reasonable alternatives to the patient's proposed care, treatment, and services. The discussion I have done encompassed risks, benefits, and side effects related to the alternatives and the risks related to not receiving the proposed care, treatment, and services.     All questions answered. Patient wishes to proceed.     Electronically signed by: NABOR RIVER MD,6/27/2024,7:42 AM

## 2024-06-28 VITALS
HEART RATE: 67 BPM | HEIGHT: 64 IN | BODY MASS INDEX: 28.85 KG/M2 | OXYGEN SATURATION: 98 % | DIASTOLIC BLOOD PRESSURE: 70 MMHG | SYSTOLIC BLOOD PRESSURE: 113 MMHG | TEMPERATURE: 97.9 F | WEIGHT: 169 LBS | RESPIRATION RATE: 16 BRPM

## 2024-06-28 PROBLEM — N81.6 RECTOCELE: Status: RESOLVED | Noted: 2024-05-09 | Resolved: 2024-06-28

## 2024-06-28 PROBLEM — N81.3 COMPLETE UTERINE PROLAPSE: Status: RESOLVED | Noted: 2024-05-09 | Resolved: 2024-06-28

## 2024-06-28 PROBLEM — N81.11 CYSTOCELE, MIDLINE: Status: RESOLVED | Noted: 2024-05-09 | Resolved: 2024-06-28

## 2024-06-28 PROBLEM — N81.4 CYSTOCELE WITH UTERINE PROLAPSE: Status: RESOLVED | Noted: 2024-06-27 | Resolved: 2024-06-28

## 2024-06-28 PROCEDURE — 6370000000 HC RX 637 (ALT 250 FOR IP): Performed by: NURSE PRACTITIONER

## 2024-06-28 PROCEDURE — 96375 TX/PRO/DX INJ NEW DRUG ADDON: CPT

## 2024-06-28 PROCEDURE — 6370000000 HC RX 637 (ALT 250 FOR IP): Performed by: OBSTETRICS & GYNECOLOGY

## 2024-06-28 PROCEDURE — 2580000003 HC RX 258: Performed by: OBSTETRICS & GYNECOLOGY

## 2024-06-28 PROCEDURE — 96376 TX/PRO/DX INJ SAME DRUG ADON: CPT

## 2024-06-28 PROCEDURE — 6360000002 HC RX W HCPCS: Performed by: OBSTETRICS & GYNECOLOGY

## 2024-06-28 PROCEDURE — 96374 THER/PROPH/DIAG INJ IV PUSH: CPT

## 2024-06-28 PROCEDURE — G0378 HOSPITAL OBSERVATION PER HR: HCPCS

## 2024-06-28 RX ORDER — PSYLLIUM SEED (WITH DEXTROSE)
1 POWDER (GRAM) ORAL 2 TIMES DAILY
Qty: 538 G | Refills: 0 | Status: SHIPPED | OUTPATIENT
Start: 2024-06-28 | End: 2024-08-09

## 2024-06-28 RX ORDER — OXYCODONE HYDROCHLORIDE AND ACETAMINOPHEN 5; 325 MG/1; MG/1
1 TABLET ORAL EVERY 6 HOURS PRN
Qty: 20 TABLET | Refills: 0 | Status: SHIPPED | OUTPATIENT
Start: 2024-06-28 | End: 2024-07-03

## 2024-06-28 RX ORDER — DOCUSATE SODIUM 100 MG/1
100 CAPSULE, LIQUID FILLED ORAL 2 TIMES DAILY
Qty: 90 CAPSULE | Refills: 0 | Status: SHIPPED | OUTPATIENT
Start: 2024-06-28 | End: 2024-08-12

## 2024-06-28 RX ORDER — POLYETHYLENE GLYCOL 3350 17 G/17G
17 POWDER ORAL DAILY
Qty: 510 G | Refills: 0 | Status: SHIPPED | OUTPATIENT
Start: 2024-06-28 | End: 2024-07-28

## 2024-06-28 RX ORDER — OXYCODONE HYDROCHLORIDE AND ACETAMINOPHEN 5; 325 MG/1; MG/1
1 TABLET ORAL EVERY 4 HOURS PRN
Status: DISCONTINUED | OUTPATIENT
Start: 2024-06-28 | End: 2024-06-28 | Stop reason: HOSPADM

## 2024-06-28 RX ORDER — IBUPROFEN 600 MG/1
600 TABLET ORAL EVERY 6 HOURS PRN
Qty: 56 TABLET | Refills: 0 | Status: SHIPPED | OUTPATIENT
Start: 2024-06-28 | End: 2024-07-12

## 2024-06-28 RX ADMIN — KETOROLAC TROMETHAMINE 30 MG: 30 INJECTION, SOLUTION INTRAMUSCULAR; INTRAVENOUS at 11:46

## 2024-06-28 RX ADMIN — SODIUM CHLORIDE: 9 INJECTION, SOLUTION INTRAVENOUS at 06:02

## 2024-06-28 RX ADMIN — LEVOTHYROXINE SODIUM 75 MCG: 0.07 TABLET ORAL at 05:59

## 2024-06-28 RX ADMIN — HYDROMORPHONE HYDROCHLORIDE 0.5 MG: 1 INJECTION, SOLUTION INTRAMUSCULAR; INTRAVENOUS; SUBCUTANEOUS at 01:13

## 2024-06-28 RX ADMIN — OXYCODONE HYDROCHLORIDE AND ACETAMINOPHEN 1 TABLET: 5; 325 TABLET ORAL at 09:07

## 2024-06-28 RX ADMIN — ACETAMINOPHEN 1000 MG: 500 TABLET ORAL at 06:18

## 2024-06-28 RX ADMIN — KETOROLAC TROMETHAMINE 30 MG: 30 INJECTION, SOLUTION INTRAMUSCULAR; INTRAVENOUS at 05:55

## 2024-06-28 RX ADMIN — SIMETHICONE 160 MG: 80 TABLET, CHEWABLE ORAL at 01:18

## 2024-06-28 ASSESSMENT — PAIN DESCRIPTION - FREQUENCY: FREQUENCY: INTERMITTENT

## 2024-06-28 ASSESSMENT — PAIN DESCRIPTION - DESCRIPTORS
DESCRIPTORS: PRESSURE
DESCRIPTORS: SORE
DESCRIPTORS: SPASM;SHARP
DESCRIPTORS: ACHING;SORE

## 2024-06-28 ASSESSMENT — PAIN - FUNCTIONAL ASSESSMENT
PAIN_FUNCTIONAL_ASSESSMENT: ACTIVITIES ARE NOT PREVENTED

## 2024-06-28 ASSESSMENT — PAIN DESCRIPTION - LOCATION
LOCATION: ABDOMEN
LOCATION: ABDOMEN
LOCATION: VAGINA
LOCATION: VAGINA

## 2024-06-28 ASSESSMENT — PAIN SCALES - GENERAL
PAINLEVEL_OUTOF10: 5
PAINLEVEL_OUTOF10: 3
PAINLEVEL_OUTOF10: 5
PAINLEVEL_OUTOF10: 3
PAINLEVEL_OUTOF10: 7
PAINLEVEL_OUTOF10: 6
PAINLEVEL_OUTOF10: 3
PAINLEVEL_OUTOF10: 6

## 2024-06-28 ASSESSMENT — PAIN DESCRIPTION - ORIENTATION: ORIENTATION: RIGHT;LEFT;MID

## 2024-06-28 ASSESSMENT — PAIN DESCRIPTION - ONSET: ONSET: GRADUAL

## 2024-06-28 ASSESSMENT — PAIN DESCRIPTION - PAIN TYPE: TYPE: SURGICAL PAIN

## 2024-06-28 NOTE — PROGRESS NOTES
Patient has not urinated since esparza removal at 0800, called and spoke with Dr Virgie Lara's APRN.  She would like patient to urinate again before discharge.  If she can not place esparza again.  Will monitor.

## 2024-06-28 NOTE — PROGRESS NOTES
Wells catheter removed according to Dr Garvin orders.  Patient up to BR and did urinate, missed hat for collection.

## 2024-06-28 NOTE — PROGRESS NOTES
Patient alert and oriented. VSS. Packing removed per orders. Moderate amount of sanguinous drainage on pad. Dr. Garvin made aware via perfect serve. Wells in place. Draining. Output documented. PIV infusing  mL/hr. Patient pain controlled with PRN medications and scheduled meds. See MAR. Pt denies needs at this time.

## 2024-06-28 NOTE — PROGRESS NOTES
Rounded on patient for discharge.  When evaluating pain control she notes increased vaginal pressure and back discomfort.  She has taken dilaudid yesterday with relief.  We discussed at home pain management and prior surgeries as she has a reported side effect of Percocet as hallucinations.  She states she has been given tramadol in the past with no side effects but not as beneficial for pain and worries about this and wishes \"she hadn't reported percocet side effect\" and could possibly try this again.  After discussion we elected trial of one percocet now so we can assess how she tolerates this and if it helps with her pain.   Will defer discharge till this afternoon to assess outcomes  I did order one Percocet tablet and informed RN of plan  Jane Luther, APRN - CNP

## 2024-06-28 NOTE — DISCHARGE SUMMARY
Hospital Discharge Summary      Patient ID: Christina Thomas      Patient's PCP: Jeffery Waters DO    Admit Date: 2024     Discharge Date: 2024  The patient was seen and examined on day of discharge and this discharge summary is in conjunction with any daily progress note from day of discharge.    Admitting Physician: Diony Garvin MD    Discharge Physician: Jane Luther, APRN - CNP     Admitted for No chief complaint on file.      Admitting Diagnosis Complete uterine prolapse [N81.3]  Cystocele, midline [N81.11]  Rectocele [N81.6]  Cystocele with uterine prolapse [N81.4]    Discharge Diagnoses:   There are no active hospital problems to display for this patient.         Hospital Course:   POD 1  Patient awake and alert  She reports vaginal pressure and back discomfort.  Plans to request pain medication.  Vaginal packing removed.  Passed void trial  VSS, I & O appropriate.    Tolerating regular diet  Nausea yesterday, has resolved          Consults:     None        Disposition: home    Discharged Condition: Stable    Code Status: Full Code    Activity: no lifting, Driving, or Strenuous exercise for 2 weeks    Diet: regular diet      Wound Care: none needed    SUBJECTIVE / Interval History:   Patient awake and sitting in chair  We discussed concerns for at home pain management and she did trial one percocet and had no adverse side effects.  She is tolerating regular diet with no further nausea  She will be discharged home with her  and all instructions were reviewed.    Exam:  TEMPERATURE:  Current - Temp: 97.9 °F (36.6 °C); Max - Temp  Av.8 °F (36.6 °C)  Min: 97.2 °F (36.2 °C)  Max: 98.6 °F (37 °C)  RESPIRATIONS RANGE: Resp  Avg: 15.4  Min: 11  Max: 22  PULSE RANGE: Pulse  Av.1  Min: 52  Max: 88  BLOOD PRESSURE RANGE:  Systolic (24hrs), Av , Min:99 , Max:126   ; Diastolic (24hrs), Av, Min:62, Max:79    PULSE OXIMETRY RANGE: SpO2  Av.5 %  Min: 96 %  Max: 100  file.      Time Spent on discharge is more than 20 minutes in the examination, evaluation, counseling and review of medications and discharge plan.      Signed:  MEGAN Fowler - CNP   6/28/2024    The note was completed using EMR. Every effort was made to ensure accuracy; however, inadvertent computerized transcription errors may be present.

## 2024-06-28 NOTE — PLAN OF CARE
Patient received discharge orders.  IV removed.  Surgical pain controlled, tolerating general diet.  Wells 16 F inserted using sterile technique, patient tolerated well.  Leg bag applied per protocol for Dr Garvin's patients.  Instructed on care, and emptying, patient verbalized understanding.  Reviewed discharge instructions with patient, verbalized understanding.  Ready for transport.

## 2024-06-28 NOTE — CARE COORDINATION
Case Management Assessment  Initial Evaluation    Date/Time of Evaluation: 6/28/2024 10:15 AM  Assessment Completed by: Tabby Messer RN    If patient is discharged prior to next notation, then this note serves as note for discharge by case management.    Patient Name: Christina Thomas                   YOB: 1975  Diagnosis: Complete uterine prolapse [N81.3]  Cystocele, midline [N81.11]  Rectocele [N81.6]  Cystocele with uterine prolapse [N81.4]                   Date / Time: 6/27/2024  7:04 AM    Patient Admission Status: Observation   Readmission Risk (Low < 19, Mod (19-27), High > 27): No data recorded  Current PCP: Jeffery Waters, DO  PCP verified by CM? Yes    Chart Reviewed: Yes      History Provided by: Patient  Patient Orientation: Alert and Oriented    Patient Cognition: Alert    Hospitalization in the last 30 days (Readmission):  No    If yes, Readmission Assessment in CM Navigator will be completed.    Advance Directives:      Code Status: Full Code   Patient's Primary Decision Maker is: Legal Next of Kin    Primary Decision Maker: Mauro Thomas - Spouse - [phone]    Secondary Decision Maker: Marlene Stone - Child - [phone]    Discharge Planning:    Patient lives with: Spouse/Significant Other, Children Type of Home: House  Primary Care Giver: Self  Patient Support Systems include: Spouse/Significant Other   Current Financial resources: None  Current community resources: None  Current services prior to admission: None            Current DME:              Type of Home Care services:  None    ADLS  Prior functional level: Independent in ADLs/IADLs  Current functional level: Independent in ADLs/IADLs    PT AM-PAC:   /24  OT AM-PAC:   /24    Family can provide assistance at DC: Yes  Would you like Case Management to discuss the discharge plan with any other family members/significant others, and if so, who? No  Plans to Return to Present Housing: Yes  Other Identified  Issues/Barriers to RETURNING to current housing: N/A  Potential Assistance needed at discharge: N/A            Potential DME:    Patient expects to discharge to: House  Plan for transportation at discharge: Self    Financial    Payor: [employer] / Plan: [employer] / Product Type: *No Product type* /     Does insurance require precert for SNF: Yes    Potential assistance Purchasing Medications: No  Meds-to-Beds request:        [id number] - [place], [place] - [address] - P [phone] - F [phone]  [address] [place] [address]  Phone: [phone] Fax: [phone]    [facility] - [place], [place] - [address] - P [phone] - F [phone]  [address]  [place] [address]  Phone: [phone] Fax: [phone]    [facility] - [facility] [address], [address] - P [phone] - F [phone]  [address] [employer] [address] [address], [address]  [place] [address]  Phone: [phone] Fax: [phone]      Notes:    Factors facilitating achievement of predicted outcomes: Family support, Motivated, Cooperative, and Pleasant    Barriers to discharge: Pain and ely diet, mobility    Additional Case Management Notes: Patient is from home with spouse and is independent with all ADL's. She will have transport to home and denies needs from .     The Plan for Transition of Care is related to the following treatment goals of Complete uterine prolapse [N81.3]  Cystocele, midline [N81.11]  Rectocele [N81.6]  Cystocele with uterine prolapse [N81.4]    IF APPLICABLE: The Patient and/or patient representative Christina and her family were provided with a choice of provider and agrees with the discharge plan. Freedom of choice list with basic dialogue that supports the patient's individualized plan of care/goals and shares the quality data associated with the providers was provided to: Patient   Patient Representative Name:       The Patient and/or Patient

## 2024-07-01 ENCOUNTER — TELEPHONE (OUTPATIENT)
Dept: UROGYNECOLOGY | Age: 49
End: 2024-07-01

## 2024-07-01 ENCOUNTER — TELEPHONE (OUTPATIENT)
Dept: FAMILY MEDICINE CLINIC | Age: 49
End: 2024-07-01

## 2024-07-01 NOTE — TELEPHONE ENCOUNTER
Patient is taking Colace and metamucil twice a day, as well as Miralax once a day. She is also eating Fiber cereal and prunes as well as drinking smooth move tea.    I informed her to continue this same regimen, but take Milk of magnesia today. If she is not able to have relief by end of day today (small pebble stools do not count), call us tomorrow morning for further instruction.     Patient verbalizes understanding of all of the above, and has no further questions or concerns at this time. Encouraged to call back the office should any questions/concerns arise. 7/1/2024 at 10:46 AM.

## 2024-07-01 NOTE — TELEPHONE ENCOUNTER
Care Transitions Initial Follow Up Call    Outreach made within 2 business days of discharge: Yes    Patient: Christina Thomas Patient : 1975   MRN: [id number]  Reason for Admission: There are no discharge diagnoses documented for the most recent discharge.  Discharge Date: 24       Spoke with: Christina, patient had a planned surgery and will be following up with specialist.    Discharge department/facility: [facility] Interactive Patient Contact:  Was patient able to fill all prescriptions: Yes  Was patient instructed to bring all medications to the follow-up visit: Yes  Is patient taking all medications as directed in the discharge summary? Yes  Does patient understand their discharge instructions: Yes  Does patient have questions or concerns that need addressed prior to 7-14 day follow up office visit: no    Scheduled appointment with PCP within 7-14 days    Follow Up  Future Appointments   Date Time Provider Department Center   2024  1:30 PM Jane Luther APRN - CNP [facility]   10/31/2024  9:30 AM Jeffery Waters DO [facility] - ANNA Colby LPN

## 2024-07-02 NOTE — RESULT ENCOUNTER NOTE
Patient made aware of normal pathology. Patient verbalizes understanding of all of the above, and has no further questions or concerns at this time. Encouraged to call back the office should any questions/concerns arise. 7/2/2024 at 9:33 AM.

## 2024-07-03 ENCOUNTER — OFFICE VISIT (OUTPATIENT)
Dept: UROGYNECOLOGY | Age: 49
End: 2024-07-03
Payer: COMMERCIAL

## 2024-07-03 VITALS
SYSTOLIC BLOOD PRESSURE: 145 MMHG | RESPIRATION RATE: 16 BRPM | OXYGEN SATURATION: 98 % | DIASTOLIC BLOOD PRESSURE: 81 MMHG | HEART RATE: 67 BPM | TEMPERATURE: 98.5 F

## 2024-07-03 DIAGNOSIS — R35.0 URINARY FREQUENCY: ICD-10-CM

## 2024-07-03 DIAGNOSIS — R39.15 URINARY URGENCY: Primary | ICD-10-CM

## 2024-07-03 DIAGNOSIS — N39.0 ACUTE UTI: ICD-10-CM

## 2024-07-03 LAB
BILIRUBIN, POC: NORMAL
BLOOD URINE, POC: NORMAL
CLARITY, POC: NORMAL
COLOR, POC: NORMAL
GLUCOSE URINE, POC: NORMAL
KETONES, POC: NORMAL
LEUKOCYTE EST, POC: NORMAL
NITRITE, POC: NORMAL
PH, POC: 7
PROTEIN, POC: NORMAL
SPECIFIC GRAVITY, POC: 1.01
UROBILINOGEN, POC: NORMAL

## 2024-07-03 PROCEDURE — 81002 URINALYSIS NONAUTO W/O SCOPE: CPT | Performed by: NURSE PRACTITIONER

## 2024-07-03 PROCEDURE — 99024 POSTOP FOLLOW-UP VISIT: CPT | Performed by: NURSE PRACTITIONER

## 2024-07-03 RX ORDER — NITROFURANTOIN 25; 75 MG/1; MG/1
100 CAPSULE ORAL 2 TIMES DAILY
Qty: 14 CAPSULE | Refills: 0 | Status: SHIPPED | OUTPATIENT
Start: 2024-07-03 | End: 2024-07-10

## 2024-07-03 NOTE — PROGRESS NOTES
Take 1 tablet by mouth every 8 hours as needed for Nausea or Vomiting 30 tablet 5    rizatriptan (MAXALT-MLT) 10 MG disintegrating tablet Take 1 tablet by mouth once as needed for Migraine May repeat in 2 hours if needed 30 tablet 5    fluticasone (FLONASE) 50 MCG/ACT nasal spray 1 spray by Each Nostril route daily       No current facility-administered medications for this visit.     Allergies:   Allergies   Allergen Reactions    Oxycodone Hallucinations     Social History:   Social History     Socioeconomic History    Marital status:      Spouse name: Not on file    Number of children: Not on file    Years of education: Not on file    Highest education level: Not on file   Occupational History    Not on file   Tobacco Use    Smoking status: Never    Smokeless tobacco: Never   Vaping Use    Vaping Use: Never used   Substance and Sexual Activity    Alcohol use: Yes     Alcohol/week: 8.3 standard drinks of alcohol     Types: 10 Standard drinks or equivalent per week     Comment: OCCASIONALLY    Drug use: No    Sexual activity: Yes     Partners: Male   Other Topics Concern    Not on file   Social History Narrative    Not on file     Social Determinants of Health     Financial Resource Strain: Low Risk  (10/24/2023)    Overall Financial Resource Strain (CARDIA)     Difficulty of Paying Living Expenses: Not hard at all   Food Insecurity: No Food Insecurity (6/27/2024)    Hunger Vital Sign     Worried About Running Out of Food in the Last Year: Never true     Ran Out of Food in the Last Year: Never true   Transportation Needs: No Transportation Needs (6/27/2024)    PRAPARE - Transportation     Lack of Transportation (Medical): No     Lack of Transportation (Non-Medical): No   Physical Activity: Not on file   Stress: Not on file   Social Connections: Not on file   Intimate Partner Violence: Not on file   Housing Stability: Low Risk  (6/27/2024)    Housing Stability Vital Sign     Unable to Pay for Housing in the

## 2024-07-06 LAB
BACTERIA UR CULT: ABNORMAL
ORGANISM: ABNORMAL

## 2024-08-07 ENCOUNTER — OFFICE VISIT (OUTPATIENT)
Dept: UROGYNECOLOGY | Age: 49
End: 2024-08-07
Payer: COMMERCIAL

## 2024-08-07 ENCOUNTER — TELEPHONE (OUTPATIENT)
Dept: UROGYNECOLOGY | Age: 49
End: 2024-08-07

## 2024-08-07 VITALS
OXYGEN SATURATION: 98 % | TEMPERATURE: 98.3 F | HEART RATE: 78 BPM | SYSTOLIC BLOOD PRESSURE: 135 MMHG | DIASTOLIC BLOOD PRESSURE: 96 MMHG | RESPIRATION RATE: 16 BRPM

## 2024-08-07 DIAGNOSIS — R35.0 URINARY FREQUENCY: ICD-10-CM

## 2024-08-07 DIAGNOSIS — R39.15 URINARY URGENCY: ICD-10-CM

## 2024-08-07 DIAGNOSIS — Z09 POSTOP CHECK: Primary | ICD-10-CM

## 2024-08-07 LAB
BILIRUBIN, POC: NORMAL
BLOOD URINE, POC: NORMAL
CLARITY, POC: CLEAR
COLOR, POC: YELLOW
GLUCOSE URINE, POC: NORMAL
KETONES, POC: NORMAL
LEUKOCYTE EST, POC: NORMAL
NITRITE, POC: NORMAL
PH, POC: 6.5
PROTEIN, POC: NORMAL
SPECIFIC GRAVITY, POC: 1.01
UROBILINOGEN, POC: NORMAL

## 2024-08-07 PROCEDURE — 51701 INSERT BLADDER CATHETER: CPT | Performed by: NURSE PRACTITIONER

## 2024-08-07 PROCEDURE — 81002 URINALYSIS NONAUTO W/O SCOPE: CPT | Performed by: NURSE PRACTITIONER

## 2024-08-07 PROCEDURE — 99024 POSTOP FOLLOW-UP VISIT: CPT | Performed by: NURSE PRACTITIONER

## 2024-08-07 NOTE — PROGRESS NOTES
8/7/2024     HPI:     Name: Christina Thomas  YOB: 1975    CC: Christina Thomas is a 49 y.o. female who is here for a 6 week post op evaluation.  HPI: Recently underwent Vaginal hysterectomy with right salpingectomy, vaginal vault suspension, anterior repair, posterior repair, and cystoscopy on 6/27/24.    Voiding difficulty: No  Initiating stream: No  Force stream: No  Lean forward to empty: No  Slow/intermittent stream: No  Unable to empty bladder: No  Incontinence: no   Urgency without incontinence: Yes   Frequency without incontinence: Yes   Bowel functions: normal   Pain Controlled: Yes    Patient wrote in yesterday with concerns for UTI - began with symptoms (frequency/urgency) last week, taking AZO    Had +culture for UTI at 2 week post op: Klebsiella oxytoca, treated appropriately    Past Medical History:   Past Medical History:   Diagnosis Date    Anemia     iron supplement    Headache(784.0)     Herpes simplex without mention of complication     last outbreak 2 months ago      Hypothyroidism     Migraine     PONV (postoperative nausea and vomiting)     YEARS AGO W/GALLBLADDER SURGERY NOT RECENTLY    Wears contact lenses     Wears glasses      Past Surgical History:   Past Surgical History:   Procedure Laterality Date    ACHILLES TENDON SURGERY Right 9/22/2020    MODIFIED BROSTROM PROCEDURE RIGHT ANKLE, TENOSYNOVECTOMY  POSTERIOR TIBIAL TENDON RIGHT, APPLICATION POSTERIOR  SPLINT RIGHT performed by Maura Miller DPM at [facility] OR    CHOLECYSTECTOMY, LAPAROSCOPIC  2007    DILATION AND CURETTAGE OF UTERUS  2009    FOOT SURGERY Left 2/10/2022    TENOSYNOVECTOMY POSTERIOR TIBIAL TENDON  , PLANTAR ENDOSCOPIC FASCIOTOMY LEFT FOOT, APPLICATION POSTERIOR SPLINT LEFT FOOT performed by Maura Miller DPM at [facility] OR    HERNIA REPAIR      HYSTERECTOMY (CERVIX STATUS UNKNOWN) Bilateral 6/27/2024    Vaginal hysterectomy with right salpingectomy, vaginal vault suspension,

## 2024-08-07 NOTE — TELEPHONE ENCOUNTER
Called patient back - let her know per her [place] paperwork 9/19/24 was return to work date - patient would prefer to wait till after her 12 week post op visit. Asked patient to bring return to work paperwork with her to appointment and we can complete once cleared and no more restrictions.

## 2024-08-10 LAB
BACTERIA UR CULT: ABNORMAL
ORGANISM: ABNORMAL

## 2024-08-12 DIAGNOSIS — N39.0 ACUTE UTI: Primary | ICD-10-CM

## 2024-08-12 RX ORDER — CIPROFLOXACIN 500 MG/1
500 TABLET, FILM COATED ORAL 2 TIMES DAILY
Qty: 14 TABLET | Refills: 0 | Status: SHIPPED | OUTPATIENT
Start: 2024-08-12 | End: 2024-08-19

## 2024-08-26 ENCOUNTER — TELEPHONE (OUTPATIENT)
Dept: UROGYNECOLOGY | Age: 49
End: 2024-08-26

## 2024-08-26 NOTE — TELEPHONE ENCOUNTER
Called patient back - letting us know [employer] needs updated paperwork filled out for return to work date being 9/26/24 (12 weeks post op). This RN asked patient to send us paperwork via [employer] so our office can complete it and send it back to then by the end of the week. Will await paperwork and notify patient once complete. Patient thankful.

## 2024-09-25 ENCOUNTER — OFFICE VISIT (OUTPATIENT)
Dept: UROGYNECOLOGY | Age: 49
End: 2024-09-25

## 2024-09-25 VITALS
DIASTOLIC BLOOD PRESSURE: 88 MMHG | HEART RATE: 77 BPM | SYSTOLIC BLOOD PRESSURE: 129 MMHG | RESPIRATION RATE: 18 BRPM | OXYGEN SATURATION: 97 % | TEMPERATURE: 97.9 F

## 2024-09-25 DIAGNOSIS — Z09 POSTOP CHECK: Primary | ICD-10-CM

## 2024-09-25 PROCEDURE — 99024 POSTOP FOLLOW-UP VISIT: CPT | Performed by: NURSE PRACTITIONER

## 2024-10-01 ENCOUNTER — OFFICE VISIT (OUTPATIENT)
Age: 49
End: 2024-10-01
Payer: COMMERCIAL

## 2024-10-01 VITALS
HEIGHT: 64 IN | OXYGEN SATURATION: 97 % | RESPIRATION RATE: 20 BRPM | WEIGHT: 171 LBS | BODY MASS INDEX: 29.19 KG/M2 | HEART RATE: 69 BPM | SYSTOLIC BLOOD PRESSURE: 118 MMHG | DIASTOLIC BLOOD PRESSURE: 84 MMHG

## 2024-10-01 DIAGNOSIS — R00.1 BRADYCARDIA: ICD-10-CM

## 2024-10-01 DIAGNOSIS — G43.909 MIGRAINE WITHOUT STATUS MIGRAINOSUS, NOT INTRACTABLE, UNSPECIFIED MIGRAINE TYPE: ICD-10-CM

## 2024-10-01 DIAGNOSIS — G47.10 HYPERSOMNIA: ICD-10-CM

## 2024-10-01 DIAGNOSIS — G47.33 OSA (OBSTRUCTIVE SLEEP APNEA): Primary | ICD-10-CM

## 2024-10-01 PROCEDURE — 99214 OFFICE O/P EST MOD 30 MIN: CPT

## 2024-10-01 ASSESSMENT — SLEEP AND FATIGUE QUESTIONNAIRES
HOW LIKELY ARE YOU TO NOD OFF OR FALL ASLEEP WHILE WATCHING TV: HIGH CHANCE OF DOZING
HOW LIKELY ARE YOU TO NOD OFF OR FALL ASLEEP WHILE SITTING QUIETLY AFTER LUNCH WITHOUT ALCOHOL: MODERATE CHANCE OF DOZING
HOW LIKELY ARE YOU TO NOD OFF OR FALL ASLEEP WHILE SITTING AND READING: HIGH CHANCE OF DOZING
HOW LIKELY ARE YOU TO NOD OFF OR FALL ASLEEP WHILE LYING DOWN TO REST IN THE AFTERNOON WHEN CIRCUMSTANCES PERMIT: HIGH CHANCE OF DOZING
HOW LIKELY ARE YOU TO NOD OFF OR FALL ASLEEP WHILE LYING DOWN TO REST IN THE AFTERNOON WHEN CIRCUMSTANCES PERMIT: HIGH CHANCE OF DOZING
HOW LIKELY ARE YOU TO NOD OFF OR FALL ASLEEP WHILE SITTING INACTIVE IN A PUBLIC PLACE: MODERATE CHANCE OF DOZING
HOW LIKELY ARE YOU TO NOD OFF OR FALL ASLEEP WHEN YOU ARE A PASSENGER IN A CAR FOR AN HOUR WITHOUT A BREAK: MODERATE CHANCE OF DOZING
HOW LIKELY ARE YOU TO NOD OFF OR FALL ASLEEP WHILE SITTING AND TALKING TO SOMEONE: SLIGHT CHANCE OF DOZING
HOW LIKELY ARE YOU TO NOD OFF OR FALL ASLEEP WHILE SITTING AND READING: HIGH CHANCE OF DOZING
ESS TOTAL SCORE: 18
HOW LIKELY ARE YOU TO NOD OFF OR FALL ASLEEP IN A CAR, WHILE STOPPED FOR A FEW MINUTES IN TRAFFIC: MODERATE CHANCE OF DOZING
HOW LIKELY ARE YOU TO NOD OFF OR FALL ASLEEP WHILE WATCHING TV: HIGH CHANCE OF DOZING
HOW LIKELY ARE YOU TO NOD OFF OR FALL ASLEEP WHILE SITTING INACTIVE IN A PUBLIC PLACE: MODERATE CHANCE OF DOZING
HOW LIKELY ARE YOU TO NOD OFF OR FALL ASLEEP WHILE SITTING QUIETLY AFTER LUNCH WITHOUT ALCOHOL: MODERATE CHANCE OF DOZING
HOW LIKELY ARE YOU TO NOD OFF OR FALL ASLEEP WHILE SITTING AND TALKING TO SOMEONE: SLIGHT CHANCE OF DOZING
HOW LIKELY ARE YOU TO NOD OFF OR FALL ASLEEP IN A CAR, WHILE STOPPED FOR A FEW MINUTES IN TRAFFIC: MODERATE CHANCE OF DOZING
HOW LIKELY ARE YOU TO NOD OFF OR FALL ASLEEP WHEN YOU ARE A PASSENGER IN A CAR FOR AN HOUR WITHOUT A BREAK: MODERATE CHANCE OF DOZING

## 2024-10-01 NOTE — PATIENT INSTRUCTIONS
[place] [address]   [facility]  (formerly [facility])  CPAP only [phone] [address] [place] [address]  **Near [facility] [phone]    1-800 CPAP (store) [phone] [place]   [facility]  [phone]    [facility] [phone]     [facility] [phone]    [facility] [phone]    [facility] ([facility]) [phone]

## 2024-10-01 NOTE — PROGRESS NOTES
her favorable response already and her history of sx improvement with the achievement of weight loss in the past.     Presenting HPI 12/12/23:  49 yo female RT @ [facility] with a 2 year h/o progressive worsening of sleep quality & daytime sleepiness, worse with weight gain, associated with intermittent snoring.  Several yrs ago she felt similar to this for many yrs but improved after pursuing strict diet & exercise regimen that precipitated successful weight loss & great energy levels.  However, she then had 2 lower extremity surgeries & strayed from the diet & gained ~40 lbs over 2 yrs.  No known observed apneas.  She has had 1 episode of choking from sleep with acid reflux & a few episodes of awakening gasping from sleep over the last year, but unsure if associated with anxiety.  The conversation of DILMA evaluation was prompted by discussing tx of her recurrent migraines.  + AM headaches/migraines, but do seem to fluctuate according to her menstrual cycle.    Routine is very consistent. Bedtime 9 pm & rise time 4:30 - 5 am, will rarely sleep until 5:30 on a day off.  Awakens 2-3x/night & to restroom 1x/night which is new.  Sleep is relatively restorative, although she often wants to go right back to bed, but more sleep does not help.  Has satisfactory energy levels during the day until about noon - 1pm then \"hits a wall\" for the rest of the day.  Takes a daily nap/rest period starting ~1 - 2 p until 3pm where she is dozing in and out of sleep.  Feels about the same after waking.  Inquired about narcolepsy d/t an instance over the summer of falling asleep while riding a .  [place] is 19.  No recent car wrecks/near wrecks because of the sleepiness or nodding off while driving.  Drinks 2-3 caffinated beverages/day.  Never smoker.  + family history of DIL[facility] in her half brother.      reports that she has never smoked. She has never used smokeless tobacco.    PHYSICAL EXAM:  Blood pressure 118/84, pulse 69, resp. rate

## 2024-10-02 ENCOUNTER — TELEPHONE (OUTPATIENT)
Dept: PULMONOLOGY | Age: 49
End: 2024-10-02

## 2024-10-02 NOTE — TELEPHONE ENCOUNTER
PAP orders faxed, with testing/OV note/demographics/insurance, to [facility] via [employer] at [phone].  Notified PSS.

## 2024-10-21 ENCOUNTER — TELEPHONE (OUTPATIENT)
Dept: FAMILY MEDICINE CLINIC | Age: 49
End: 2024-10-21

## 2024-10-21 NOTE — TELEPHONE ENCOUNTER
ENTRY FOR [facility] MAMMOGRAM RAFFLE    Completion of mammogram before Oct 31st equals 1 entry. Completion same day as order/visit with EFM will equal 2 entries.    Mammogram Completion date: 3/21/2024      RAFFLE TIX #: [id number]      [facility] Raffle will be held on Friday Nov 1st.  4 winners will be selected. (One from each office POD)  If chosen office staff will contact patient to coordinate raffle basket collection.

## 2024-10-31 ENCOUNTER — OFFICE VISIT (OUTPATIENT)
Dept: FAMILY MEDICINE CLINIC | Age: 49
End: 2024-10-31
Payer: COMMERCIAL

## 2024-10-31 ENCOUNTER — TELEPHONE (OUTPATIENT)
Dept: FAMILY MEDICINE CLINIC | Age: 49
End: 2024-10-31

## 2024-10-31 VITALS
HEIGHT: 64 IN | OXYGEN SATURATION: 98 % | TEMPERATURE: 97 F | HEART RATE: 72 BPM | BODY MASS INDEX: 29.26 KG/M2 | WEIGHT: 171.4 LBS | DIASTOLIC BLOOD PRESSURE: 72 MMHG | SYSTOLIC BLOOD PRESSURE: 118 MMHG

## 2024-10-31 DIAGNOSIS — E55.9 VITAMIN D DEFICIENCY: ICD-10-CM

## 2024-10-31 DIAGNOSIS — R00.1 BRADYCARDIA: ICD-10-CM

## 2024-10-31 DIAGNOSIS — E03.9 ACQUIRED HYPOTHYROIDISM: ICD-10-CM

## 2024-10-31 DIAGNOSIS — Z00.00 PREVENTATIVE HEALTH CARE: Primary | ICD-10-CM

## 2024-10-31 DIAGNOSIS — D50.9 IRON DEFICIENCY ANEMIA, UNSPECIFIED IRON DEFICIENCY ANEMIA TYPE: ICD-10-CM

## 2024-10-31 DIAGNOSIS — G43.109 MIGRAINE WITH AURA AND WITHOUT STATUS MIGRAINOSUS, NOT INTRACTABLE: ICD-10-CM

## 2024-10-31 DIAGNOSIS — E78.2 MIXED HYPERLIPIDEMIA: ICD-10-CM

## 2024-10-31 PROCEDURE — 99396 PREV VISIT EST AGE 40-64: CPT | Performed by: FAMILY MEDICINE

## 2024-10-31 RX ORDER — LEVOTHYROXINE SODIUM 75 UG/1
75 TABLET ORAL
Qty: 90 TABLET | Refills: 3 | Status: SHIPPED | OUTPATIENT
Start: 2024-10-31

## 2024-10-31 SDOH — ECONOMIC STABILITY: FOOD INSECURITY: WITHIN THE PAST 12 MONTHS, YOU WORRIED THAT YOUR FOOD WOULD RUN OUT BEFORE YOU GOT MONEY TO BUY MORE.: NEVER TRUE

## 2024-10-31 SDOH — ECONOMIC STABILITY: FOOD INSECURITY: WITHIN THE PAST 12 MONTHS, THE FOOD YOU BOUGHT JUST DIDN'T LAST AND YOU DIDN'T HAVE MONEY TO GET MORE.: NEVER TRUE

## 2024-10-31 SDOH — ECONOMIC STABILITY: INCOME INSECURITY: HOW HARD IS IT FOR YOU TO PAY FOR THE VERY BASICS LIKE FOOD, HOUSING, MEDICAL CARE, AND HEATING?: NOT HARD AT ALL

## 2024-10-31 ASSESSMENT — PATIENT HEALTH QUESTIONNAIRE - PHQ9
SUM OF ALL RESPONSES TO PHQ QUESTIONS 1-9: 6
SUM OF ALL RESPONSES TO PHQ QUESTIONS 1-9: 6
SUM OF ALL RESPONSES TO PHQ9 QUESTIONS 1 & 2: 0
5. POOR APPETITE OR OVEREATING: NOT AT ALL
1. LITTLE INTEREST OR PLEASURE IN DOING THINGS: NOT AT ALL
6. FEELING BAD ABOUT YOURSELF - OR THAT YOU ARE A FAILURE OR HAVE LET YOURSELF OR YOUR FAMILY DOWN: NOT AT ALL
2. FEELING DOWN, DEPRESSED OR HOPELESS: NOT AT ALL
10. IF YOU CHECKED OFF ANY PROBLEMS, HOW DIFFICULT HAVE THESE PROBLEMS MADE IT FOR YOU TO DO YOUR WORK, TAKE CARE OF THINGS AT HOME, OR GET ALONG WITH OTHER PEOPLE: SOMEWHAT DIFFICULT
SUM OF ALL RESPONSES TO PHQ QUESTIONS 1-9: 6
4. FEELING TIRED OR HAVING LITTLE ENERGY: NEARLY EVERY DAY
SUM OF ALL RESPONSES TO PHQ QUESTIONS 1-9: 6
3. TROUBLE FALLING OR STAYING ASLEEP: NEARLY EVERY DAY
7. TROUBLE CONCENTRATING ON THINGS, SUCH AS READING THE NEWSPAPER OR WATCHING TELEVISION: NOT AT ALL
9. THOUGHTS THAT YOU WOULD BE BETTER OFF DEAD, OR OF HURTING YOURSELF: NOT AT ALL
8. MOVING OR SPEAKING SO SLOWLY THAT OTHER PEOPLE COULD HAVE NOTICED. OR THE OPPOSITE, BEING SO FIGETY OR RESTLESS THAT YOU HAVE BEEN MOVING AROUND A LOT MORE THAN USUAL: NOT AT ALL

## 2024-10-31 ASSESSMENT — ANXIETY QUESTIONNAIRES
7. FEELING AFRAID AS IF SOMETHING AWFUL MIGHT HAPPEN: NOT AT ALL
GAD7 TOTAL SCORE: 2
5. BEING SO RESTLESS THAT IT IS HARD TO SIT STILL: NOT AT ALL
IF YOU CHECKED OFF ANY PROBLEMS ON THIS QUESTIONNAIRE, HOW DIFFICULT HAVE THESE PROBLEMS MADE IT FOR YOU TO DO YOUR WORK, TAKE CARE OF THINGS AT HOME, OR GET ALONG WITH OTHER PEOPLE: NOT DIFFICULT AT ALL
1. FEELING NERVOUS, ANXIOUS, OR ON EDGE: SEVERAL DAYS
3. WORRYING TOO MUCH ABOUT DIFFERENT THINGS: NOT AT ALL
4. TROUBLE RELAXING: NOT AT ALL
2. NOT BEING ABLE TO STOP OR CONTROL WORRYING: NOT AT ALL
6. BECOMING EASILY ANNOYED OR IRRITABLE: SEVERAL DAYS

## 2024-10-31 NOTE — TELEPHONE ENCOUNTER
Submitted PA for UBRELVY  Via [facility] Key: [id number]  STATUS: PENDING.    Follow up done daily; if no decision with in three days we will refax.  If another three days goes by with no decision will call the insurance for status.

## 2024-10-31 NOTE — PROGRESS NOTES
Christina Thomas is a 49 y.o. female    Chief Complaint   Patient presents with    Annual Exam     CPAP last week going bad- headaches worse than ever         HPI:    HPI    Preventative care.  Tdap: UTD  Flu: UTD  Cologuard: UTD    Pap smears: UTD    Migraine   This is a chronic problem. The current episode started more than 1 year ago. The problem occurs monthly. The problem has been worsening. The pain is located in the right unilateral region. Associated symptoms include nausea, phonophobia and photophobia. The symptoms are aggravated by menstrual cycle. She has tried Ubrelvy for the symptoms. The treatment provided significant relief. Her past medical history is significant for migraine headaches.      Acquired hypothyroidism.  This is a chronic issue.  The patient takes her medicine in the morning on an empty stomach.  Her recent TSH was not within normal limits.     Iron deficiency anemia. This is a chronic issue. The patient was found to be iron deficient in the past but could not tolerate iron supplementation. She does have heavy periods. The IV iron infusions are helping.     Patient has received notifications that her pulse drops very low overnight when she sleeps.  It is dropped to a low of 38.  She is asymptomatic.    Works as a respiratory therapist.     The 10-year ASCVD risk score (Isauro DK, et al., 2019) is: 1.4%    Values used to calculate the score:      Age: 49 years      Sex: Female      Is Non- : No      Diabetic: No      Tobacco smoker: No      Systolic Blood Pressure: 118 mmHg      Is BP treated: No      HDL Cholesterol: 54 mg/dL      Total Cholesterol: 257 mg/dL      ROS:    Review of Systems   Constitutional:  Positive for fatigue.   Neurological:  Positive for headaches.       /72 (Site: Right Upper Arm, Position: Sitting, Cuff Size: Large Adult)   Pulse 72   Temp 97 °F (36.1 °C) (Temporal)   Ht 1.626 m (5' 4.02\")   Wt 77.7 kg (171 lb 6.4 oz)   LMP

## 2024-11-01 NOTE — TELEPHONE ENCOUNTER
Pt has a new insurance.    Submitted PA for ubrelvy  Via [facility] Mike: [id number]  STATUS: PENDING.    Follow up done daily; if no decision with in three days we will refax.  If another three days goes by with no decision will call the insurance for status.

## 2024-11-04 NOTE — TELEPHONE ENCOUNTER
The patient has been notified of this information and all questions answered.  Patient had already picked this medication up at her pharmacy.

## 2024-11-24 ASSESSMENT — SLEEP AND FATIGUE QUESTIONNAIRES
HOW LIKELY ARE YOU TO NOD OFF OR FALL ASLEEP WHILE SITTING AND READING: HIGH CHANCE OF DOZING
HOW LIKELY ARE YOU TO NOD OFF OR FALL ASLEEP WHILE SITTING AND READING: HIGH CHANCE OF DOZING
HOW LIKELY ARE YOU TO NOD OFF OR FALL ASLEEP IN A CAR, WHILE STOPPED FOR A FEW MINUTES IN TRAFFIC: MODERATE CHANCE OF DOZING
HOW LIKELY ARE YOU TO NOD OFF OR FALL ASLEEP WHILE SITTING QUIETLY AFTER LUNCH WITHOUT ALCOHOL: MODERATE CHANCE OF DOZING
HOW LIKELY ARE YOU TO NOD OFF OR FALL ASLEEP WHILE WATCHING TV: MODERATE CHANCE OF DOZING
HOW LIKELY ARE YOU TO NOD OFF OR FALL ASLEEP WHILE LYING DOWN TO REST IN THE AFTERNOON WHEN CIRCUMSTANCES PERMIT: HIGH CHANCE OF DOZING
HOW LIKELY ARE YOU TO NOD OFF OR FALL ASLEEP WHEN YOU ARE A PASSENGER IN A CAR FOR AN HOUR WITHOUT A BREAK: HIGH CHANCE OF DOZING
HOW LIKELY ARE YOU TO NOD OFF OR FALL ASLEEP WHILE LYING DOWN TO REST IN THE AFTERNOON WHEN CIRCUMSTANCES PERMIT: HIGH CHANCE OF DOZING
HOW LIKELY ARE YOU TO NOD OFF OR FALL ASLEEP WHILE SITTING INACTIVE IN A PUBLIC PLACE: SLIGHT CHANCE OF DOZING
HOW LIKELY ARE YOU TO NOD OFF OR FALL ASLEEP WHEN YOU ARE A PASSENGER IN A CAR FOR AN HOUR WITHOUT A BREAK: HIGH CHANCE OF DOZING
HOW LIKELY ARE YOU TO NOD OFF OR FALL ASLEEP IN A CAR, WHILE STOPPED FOR A FEW MINUTES IN TRAFFIC: MODERATE CHANCE OF DOZING
HOW LIKELY ARE YOU TO NOD OFF OR FALL ASLEEP WHILE SITTING AND TALKING TO SOMEONE: MODERATE CHANCE OF DOZING
HOW LIKELY ARE YOU TO NOD OFF OR FALL ASLEEP WHILE WATCHING TV: MODERATE CHANCE OF DOZING
ESS TOTAL SCORE: 18
HOW LIKELY ARE YOU TO NOD OFF OR FALL ASLEEP WHILE SITTING AND TALKING TO SOMEONE: MODERATE CHANCE OF DOZING
HOW LIKELY ARE YOU TO NOD OFF OR FALL ASLEEP WHILE SITTING INACTIVE IN A PUBLIC PLACE: SLIGHT CHANCE OF DOZING
HOW LIKELY ARE YOU TO NOD OFF OR FALL ASLEEP WHILE SITTING QUIETLY AFTER LUNCH WITHOUT ALCOHOL: MODERATE CHANCE OF DOZING

## 2024-11-25 ENCOUNTER — OFFICE VISIT (OUTPATIENT)
Age: 49
End: 2024-11-25
Payer: COMMERCIAL

## 2024-11-25 VITALS
SYSTOLIC BLOOD PRESSURE: 120 MMHG | WEIGHT: 176.4 LBS | HEIGHT: 64 IN | DIASTOLIC BLOOD PRESSURE: 70 MMHG | OXYGEN SATURATION: 99 % | RESPIRATION RATE: 20 BRPM | BODY MASS INDEX: 30.11 KG/M2 | HEART RATE: 77 BPM

## 2024-11-25 DIAGNOSIS — G43.709 CHRONIC MIGRAINE WITHOUT AURA WITHOUT STATUS MIGRAINOSUS, NOT INTRACTABLE: ICD-10-CM

## 2024-11-25 DIAGNOSIS — G47.33 OSA (OBSTRUCTIVE SLEEP APNEA): Primary | ICD-10-CM

## 2024-11-25 DIAGNOSIS — R40.0 DAYTIME SOMNOLENCE: ICD-10-CM

## 2024-11-25 PROCEDURE — 99213 OFFICE O/P EST LOW 20 MIN: CPT

## 2024-11-25 PROCEDURE — G2211 COMPLEX E/M VISIT ADD ON: HCPCS

## 2024-11-25 NOTE — PROGRESS NOTES
MA Communication:  The following orders are received by verbal communication from Barbara Live PA-C.     Orders include:    Reminder message made to pull CR in 1 month   6 month f/u

## 2024-11-25 NOTE — PATIENT INSTRUCTIONS
system for well over 10 hours, so no caffeine after lunch. Caffeine is found in tea, cola, coffee, cocoa and chocolate.    Avoid alcohol 2-4 hours before bedtime. As alcohol is metabolized, the result is a stimulant or \"wake-up\" effect and will cause fragmented sleep.   Regular exercise is recommended to help you deepen your sleep (and MANY other reasons), but timing is important--aim to exercise in the morning or early afternoon, not within 4 hours of your bedtime.   Don’t take worries to bed. Leave worries about life, work, school etc. behind you when you go to bed.    Ensure your bedroom is quiet and comfortable. A cool, dark room is recommended. A white noise machine can help eliminate awakenings due to sounds.               CPAP Equipment Cleaning and Disinfecting Schedule  Equipment Cleaning Frequency Instructions  Disinfecting Frequency   Non-Disposable Filters  Weekly Mild soapy water, Rinse, Air Dry Not Required   Disposable Filters Change as needed  2-4 weeks Do Not Wash Not Required   Hose/tubing Daily Mild soapy water, Rinse, Air Dry Once a week   Mask / Nasal Pillows Daily Mild soapy water, Rinse, Air Dry Once a week   Headgear Weekly Hand wash, Mild soapy water, Rinse, Dry  Not Required   Humidifier Daily Empty water daily  Mild soapy water, Rinse well, Air Dry  Once a week   CPAP Unit As Needed Dust with damp cloth,  No detergents or sprays Not Required         Disinfect (per schedule) with 1 part white vinegar and 3 parts water- soak mask and water chamber for 30 minutes every 1-2 weeks, more often if sick.  Allow water/vinegar mixture to run through tubing.  Allow all equipment to air dry.   Drying Hints:   Always hang tubing away from direct sunlight, as this will cause the tubing to become yellow, brittle and crack over a period of time. DO NOT attach the wet tubing to your CPAP unit to blow-dry it. The moisture from the tubing can drain back into your machine. Moisture in your unit can cause

## 2024-11-25 NOTE — PROGRESS NOTES
SLEEP MEDICINE PROGRESS NOTE  CC: Sleepiness after noon, recurrent migraines   Referring Provider: Dr. Waters     Interval History 11/25/24:  31-90  -  Set up with AirSense 11 10/23/24.  Really struggled with CPAP for the first 1-2 weeks but stayed consistent with it and has made significant progress with tolerance.  New discomforts continue to arise, overall worsened by sleeping in a different bedroom downstairs while her parents are in town (which will continue the next 2 weeks).  She unfortunately does not perceive any benefit from CPAP thus far.  Sleep is still non-restorative, sleep tired in afternoons, migraines are just as frequent.  Typically forces herself to stay awake until 9p, then wakes up at ~4a before her alarm.  If awakening is prior to 1a then typically able to get back to sleep but if after 3a then generally just stays awake.  Exercises in AM.    -  DILMA treated with benefit with APAP 5-15; used 5:01 hrs avg with compliance >4 hour use 21/30 (used 30) days, residual AHI 1.2.  Pressures: median 5.3, 95th% 6.5, max 7.3.  ESS: 18.       Interval History 10/1/24:    -  Pt returns concerned with return of DILMA signs/sxs.  Over the summer had pelvic surgery w/ general anesthesia and has had restrictions for 12 weeks.  During this time, she reports gaining weight and has only recently been able to return to exercising (walk 2 mi/day.)  She has also begun snoring again per her  & waking up choking on several instances, which usually involve acid reflux.  The recent instance was quite frightening.  In addition, she has been having episodes of bradycardia during sleep.  She was notified of this post-operatively by 2 nurses & her watch is also recording this at home.  Recently had 10 mins of sustained bradycardia with HR 40-50 bpm.  Wayne 38 bpm.  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  She is fatigued during the day but has chalked it up to lack of exercise &

## 2024-11-26 ENCOUNTER — TELEPHONE (OUTPATIENT)
Dept: PULMONOLOGY | Age: 49
End: 2024-11-26

## 2024-11-26 NOTE — TELEPHONE ENCOUNTER
Pressure change and OV/CR faxed to [facility] for 31-90 day documentation via [employer] at [phone].

## 2024-12-07 ENCOUNTER — TELEPHONE (OUTPATIENT)
Dept: FAMILY MEDICINE CLINIC | Age: 49
End: 2024-12-07

## 2024-12-07 RX ORDER — NITROFURANTOIN 25; 75 MG/1; MG/1
100 CAPSULE ORAL 2 TIMES DAILY
Qty: 10 CAPSULE | Refills: 0 | Status: SHIPPED | OUTPATIENT
Start: 2024-12-07 | End: 2024-12-12

## 2024-12-07 RX ORDER — PHENAZOPYRIDINE HYDROCHLORIDE 200 MG/1
200 TABLET, FILM COATED ORAL 3 TIMES DAILY PRN
Qty: 15 TABLET | Refills: 0 | Status: SHIPPED | OUTPATIENT
Start: 2024-12-07 | End: 2024-12-12

## 2024-12-07 NOTE — PROGRESS NOTES
Received on call phone call from patient who reports history of recurrent UTI over last several months. Started after hysterectomy. Also noticed increased susceptibility if not keeping up fluid intake. Reports burning, increased frequency, chills which is similiar to previous episodes. Reports responding well to abx previously. Reviewed chart which shows two urine cultures with different bacteria. Will send in macrobid x 5 days and pyridium. Follow up if not improving.

## 2024-12-26 ENCOUNTER — TELEPHONE (OUTPATIENT)
Dept: PULMONOLOGY | Age: 49
End: 2024-12-26

## 2024-12-26 NOTE — TELEPHONE ENCOUNTER
Please tell patient her usage and treatment both look great from my perspective after the changes we made LOV.  If she is doing OK with them then we will leave them as is, if she has requests for further adjustments I'm happy to hear them.   Does she think she will plan to continue with CPAP at this point or might she return the device?

## 2024-12-26 NOTE — TELEPHONE ENCOUNTER
1 month CR per LOV-scanned for review.     APAP 5-15 cmH2O --> decreased to 4-6.6 cmH2O, increased EPR 1 to 3; CR 1 mo.

## 2024-12-31 NOTE — TELEPHONE ENCOUNTER
Patient informed of Barbara's message and voiced understanding.  Patient stated what Barbara did has helped a lot.  Planning on continuing with CPAP, however, she may call DME to try and get a different mask.

## 2025-02-11 ENCOUNTER — TELEPHONE (OUTPATIENT)
Dept: FAMILY MEDICINE CLINIC | Age: 50
End: 2025-02-11

## 2025-02-12 NOTE — TELEPHONE ENCOUNTER
Submitted PA for Ubrelvy 100MG tablets   Via [facility] Key: [id number] STATUS: PENDING.    Follow up done daily; if no decision with in three days we will refax.  If another three days goes by with no decision will call the insurance for status.

## 2025-02-17 NOTE — TELEPHONE ENCOUNTER
I think she can do 16 tablets per 30 days.  Can you confirm with her?  If so then lets try to do that.

## 2025-02-18 NOTE — TELEPHONE ENCOUNTER
This insurance will only cover 16 per 30 days.  Please have the pharmacy run through that way and the patient will have access to the medication.    If this requires a response please respond to the pool ( P MHCX P[facility] MEDICATION PRE-AUTH).      Thank you please advise patient.

## 2025-02-24 ENCOUNTER — OFFICE VISIT (OUTPATIENT)
Dept: FAMILY MEDICINE CLINIC | Age: 50
End: 2025-02-24
Payer: COMMERCIAL

## 2025-02-24 VITALS
HEART RATE: 66 BPM | HEIGHT: 64 IN | DIASTOLIC BLOOD PRESSURE: 64 MMHG | BODY MASS INDEX: 29.91 KG/M2 | WEIGHT: 175.2 LBS | TEMPERATURE: 97.4 F | SYSTOLIC BLOOD PRESSURE: 126 MMHG | OXYGEN SATURATION: 99 %

## 2025-02-24 DIAGNOSIS — G43.109 MIGRAINE WITH AURA AND WITHOUT STATUS MIGRAINOSUS, NOT INTRACTABLE: ICD-10-CM

## 2025-02-24 DIAGNOSIS — H93.8X2 FULLNESS IN LEFT EAR: Primary | ICD-10-CM

## 2025-02-24 PROCEDURE — 99214 OFFICE O/P EST MOD 30 MIN: CPT | Performed by: FAMILY MEDICINE

## 2025-02-24 RX ORDER — PREDNISONE 20 MG/1
TABLET ORAL
Qty: 18 TABLET | Refills: 0 | Status: SHIPPED | OUTPATIENT
Start: 2025-02-24

## 2025-02-24 SDOH — ECONOMIC STABILITY: FOOD INSECURITY: WITHIN THE PAST 12 MONTHS, THE FOOD YOU BOUGHT JUST DIDN'T LAST AND YOU DIDN'T HAVE MONEY TO GET MORE.: NEVER TRUE

## 2025-02-24 SDOH — ECONOMIC STABILITY: FOOD INSECURITY: WITHIN THE PAST 12 MONTHS, YOU WORRIED THAT YOUR FOOD WOULD RUN OUT BEFORE YOU GOT MONEY TO BUY MORE.: NEVER TRUE

## 2025-02-24 ASSESSMENT — PATIENT HEALTH QUESTIONNAIRE - PHQ9
SUM OF ALL RESPONSES TO PHQ QUESTIONS 1-9: 0
3. TROUBLE FALLING OR STAYING ASLEEP: NOT AT ALL
10. IF YOU CHECKED OFF ANY PROBLEMS, HOW DIFFICULT HAVE THESE PROBLEMS MADE IT FOR YOU TO DO YOUR WORK, TAKE CARE OF THINGS AT HOME, OR GET ALONG WITH OTHER PEOPLE: NOT DIFFICULT AT ALL
SUM OF ALL RESPONSES TO PHQ QUESTIONS 1-9: 0
5. POOR APPETITE OR OVEREATING: NOT AT ALL
SUM OF ALL RESPONSES TO PHQ QUESTIONS 1-9: 0
9. THOUGHTS THAT YOU WOULD BE BETTER OFF DEAD, OR OF HURTING YOURSELF: NOT AT ALL
2. FEELING DOWN, DEPRESSED OR HOPELESS: NOT AT ALL
SUM OF ALL RESPONSES TO PHQ9 QUESTIONS 1 & 2: 0
6. FEELING BAD ABOUT YOURSELF - OR THAT YOU ARE A FAILURE OR HAVE LET YOURSELF OR YOUR FAMILY DOWN: NOT AT ALL
4. FEELING TIRED OR HAVING LITTLE ENERGY: NOT AT ALL
SUM OF ALL RESPONSES TO PHQ QUESTIONS 1-9: 0
8. MOVING OR SPEAKING SO SLOWLY THAT OTHER PEOPLE COULD HAVE NOTICED. OR THE OPPOSITE, BEING SO FIGETY OR RESTLESS THAT YOU HAVE BEEN MOVING AROUND A LOT MORE THAN USUAL: NOT AT ALL
7. TROUBLE CONCENTRATING ON THINGS, SUCH AS READING THE NEWSPAPER OR WATCHING TELEVISION: NOT AT ALL
1. LITTLE INTEREST OR PLEASURE IN DOING THINGS: NOT AT ALL

## 2025-02-24 NOTE — PROGRESS NOTES
Current Outpatient Medications   Medication Sig Dispense Refill    Ubrogepant 100 MG TABS Take 100 mg by mouth daily as needed (Migraine) ; if symptoms persist or return, may repeat dose after after 2 hours. Maximum: 200 mg per 24 hours 16 tablet 11    predniSONE (DELTASONE) 20 MG tablet Do not fill until desired. Take 3 tablets daily for 3 days, then 2 tablets daily for 3 days, then 1 tablet daily for 3 days 18 tablet 0    levothyroxine (SYNTHROID) 75 MCG tablet Take 1 tablet by mouth every morning (before breakfast) 90 tablet 3    ibuprofen (ADVIL;MOTRIN) 600 MG tablet Take 1 tablet by mouth every 6 hours as needed for Pain 56 tablet 0    acetaminophen (TYLENOL) 500 MG tablet Take 1 tablet by mouth every 6 hours as needed for Pain      lidocaine (LIDODERM) 5 % Place 1 patch onto the skin daily 12 hours on, 12 hours off. 30 patch 11    rizatriptan (MAXALT-MLT) 10 MG disintegrating tablet Take 1 tablet by mouth once as needed for Migraine May repeat in 2 hours if needed 30 tablet 5    fluticasone (FLONASE) 50 MCG/ACT nasal spray 1 spray by Each Nostril route daily (Patient not taking: Reported on 2/24/2025)       No current facility-administered medications for this visit.       Assessment:    1. Fullness in left ear    2. Migraine with aura and without status migrainosus, not intractable        Plan:    1. Fullness in left ear  I do not think she has an otitis media infection.  The patient has no external ear infection.  I would recommend trying Sudafed.  I discussed the side effects.  If no improvement after about 3 days, she will take the oral steroid with food.  - predniSONE (DELTASONE) 20 MG tablet; Do not fill until desired. Take 3 tablets daily for 3 days, then 2 tablets daily for 3 days, then 1 tablet daily for 3 days  Dispense: 18 tablet; Refill: 0    2. Migraine with aura and without status migrainosus, not intractable  Her insurance only covers 16 Ubrelvy per month.  The patient thinks she can

## 2025-03-06 ENCOUNTER — PATIENT MESSAGE (OUTPATIENT)
Dept: FAMILY MEDICINE CLINIC | Age: 50
End: 2025-03-06

## 2025-03-06 DIAGNOSIS — H93.8X2 FULLNESS IN LEFT EAR: Primary | ICD-10-CM

## 2025-03-18 NOTE — PROGRESS NOTES
[facility]  DIVISION OF OTOLARYNGOLOGY- HEAD & NECK SURGERY  CONSULT    Patient Name: Christina DUBOIS [facility]  Medical Record Number:  [id number]  Primary Care Physician:  Jeffery Waters DO  Date of Consultation: 3/21/2025    Chief Complaint:   Chief Complaint   Patient presents with    New Patient     Left ear fullness       HISTORY OF PRESENT ILLNESS  Christina is a(n) 50 y.o. female who presents for evaluation of left-sided ear fullness and ringing.  She states that it is very bothersome for her.  Started in February.  She was placed on a 7-day course of steroids without any improvement in her hearing.  She is also been trying Sudafed over-the-counter.  No other significant ear history  Patient Active Problem List   Diagnosis    Headache    Labor and delivery indication for care or intervention    Migraine    Hip impingement syndrome    Right ankle instability    Tenosynovitis of left ankle    Acquired hypothyroidism    Mixed hyperlipidemia     Past Surgical History:   Procedure Laterality Date    ACHILLES TENDON SURGERY Right 9/22/2020    MODIFIED BROSTROM PROCEDURE RIGHT ANKLE, TENOSYNOVECTOMY  POSTERIOR TIBIAL TENDON RIGHT, APPLICATION POSTERIOR  SPLINT RIGHT performed by Maura Miller DPM at [facility] OR    CHOLECYSTECTOMY, LAPAROSCOPIC  2007    DILATION AND CURETTAGE OF UTERUS  2009    FOOT SURGERY Left 2/10/2022    TENOSYNOVECTOMY POSTERIOR TIBIAL TENDON  , PLANTAR ENDOSCOPIC FASCIOTOMY LEFT FOOT, APPLICATION POSTERIOR SPLINT LEFT FOOT performed by Maura Miller DPM at [facility] OR    HERNIA REPAIR      HYSTERECTOMY (CERVIX STATUS UNKNOWN) Bilateral 6/27/2024    Vaginal hysterectomy with right salpingectomy, vaginal vault suspension, anterior repair, posterior repair, and cystoscopy performed by Dioyn Garvin MD at [facility] OR    KNEE SURGERY  2005    rt knee surgery     Family History   Problem Relation Age of Onset    Asthma Mother     High Blood Pressure Mother     Stroke Mother

## 2025-03-21 ENCOUNTER — PROCEDURE VISIT (OUTPATIENT)
Dept: AUDIOLOGY | Age: 50
End: 2025-03-21

## 2025-03-21 ENCOUNTER — OFFICE VISIT (OUTPATIENT)
Dept: ENT CLINIC | Age: 50
End: 2025-03-21
Payer: COMMERCIAL

## 2025-03-21 VITALS
SYSTOLIC BLOOD PRESSURE: 148 MMHG | HEART RATE: 84 BPM | BODY MASS INDEX: 29.88 KG/M2 | WEIGHT: 175 LBS | HEIGHT: 64 IN | DIASTOLIC BLOOD PRESSURE: 105 MMHG

## 2025-03-21 DIAGNOSIS — H90.42 SENSORINEURAL HEARING LOSS (SNHL) OF LEFT EAR WITH UNRESTRICTED HEARING OF RIGHT EAR: ICD-10-CM

## 2025-03-21 DIAGNOSIS — H90.3 ASYMMETRICAL SENSORINEURAL HEARING LOSS: Primary | ICD-10-CM

## 2025-03-21 DIAGNOSIS — H91.8X3 ASYMMETRICAL HEARING LOSS: Primary | ICD-10-CM

## 2025-03-21 DIAGNOSIS — H93.12 TINNITUS OF LEFT EAR: ICD-10-CM

## 2025-03-21 PROCEDURE — 99204 OFFICE O/P NEW MOD 45 MIN: CPT | Performed by: STUDENT IN AN ORGANIZED HEALTH CARE EDUCATION/TRAINING PROGRAM

## 2025-03-21 ASSESSMENT — ENCOUNTER SYMPTOMS
RHINORRHEA: 0
EYE PAIN: 0
COUGH: 0
SHORTNESS OF BREATH: 0
NAUSEA: 0
VOMITING: 0

## 2025-03-21 NOTE — PROGRESS NOTES
Christina Thomas   1975, 50 y.o. female   [id number]       Referring Provider: Jeffery Waters DO   Referral Type: In an order in Epic    Reason for Visit: Evaluation of suspected change in hearing, tinnitus, or balance.    ADULT AUDIOLOGIC EVALUATION      Christina Thomas is a 50 y.o. female seen today, 3/21/2025 , for an initial audiologic evaluation.  Patient was seen by Al Chris DO following today's evaluation.    AUDIOLOGIC AND OTHER PERTINENT MEDICAL HISTORY:      Christina Thomas reports aural pressure and fluctuating pulsatile tinnitus in the Left Ear. Symptoms have been occurring for several weeks. Patient has a history of migraine headaches. Patient reported a history of dizziness 5-6 years ago and went to Physical Therapy for her symptoms. Patient denied any recent vertigo.     She denied otalgia, otorrhea, imbalance, history of noise exposure, history of head trauma, history of ear surgery, and family history of hearing loss    Date: 3/21/2025     IMPRESSIONS:      Today's results revealed an asymmetrical hearing loss @ 3 kHz and 6-8 kHz (Right Ear better than the Left Ear).     Excellent speech understanding when in quiet. Tympanometry indicates normal middle ear function.     Discussed test results and implications with patient. Recommended consult with ENT physician due to noted history of asymmetrical hearing loss.    Follow medical recommendations of Al Chris DO.     ASSESSMENT AND FINDINGS:     Otoscopy unremarkable.    RIGHT EAR:  Hearing Sensitivity: Normal hearing sensitivity 250-8000 Hz  Speech Recognition Threshold: 10 dB HL  Word Recognition: Excellent 96%, based on NU-6 25-word list at 50 dBHL with 20 dB HL masking noise using recorded speech stimuli.    Tympanometry: Normal peak pressure and compliance, Type A tympanogram, consistent with normal middle ear function.       LEFT EAR:  Hearing Sensitivity: Normal sloping to Mild Sensorineural hearing loss  Speech

## 2025-04-24 ENCOUNTER — HOSPITAL ENCOUNTER (OUTPATIENT)
Dept: WOMENS IMAGING | Age: 50
Discharge: HOME | End: 2025-04-24
Payer: COMMERCIAL

## 2025-04-24 ENCOUNTER — RESULTS FOLLOW-UP (OUTPATIENT)
Dept: OBGYN CLINIC | Age: 50
End: 2025-04-24

## 2025-04-24 DIAGNOSIS — Z12.31 VISIT FOR SCREENING MAMMOGRAM: ICD-10-CM

## 2025-04-24 PROCEDURE — 77067 SCR MAMMO BI INCL CAD: CPT

## 2025-06-02 ENCOUNTER — OFFICE VISIT (OUTPATIENT)
Age: 50
End: 2025-06-02
Payer: COMMERCIAL

## 2025-06-02 ENCOUNTER — PATIENT MESSAGE (OUTPATIENT)
Dept: FAMILY MEDICINE CLINIC | Age: 50
End: 2025-06-02

## 2025-06-02 VITALS
OXYGEN SATURATION: 98 % | WEIGHT: 181.4 LBS | DIASTOLIC BLOOD PRESSURE: 88 MMHG | RESPIRATION RATE: 20 BRPM | BODY MASS INDEX: 30.97 KG/M2 | SYSTOLIC BLOOD PRESSURE: 132 MMHG | HEART RATE: 65 BPM | HEIGHT: 64 IN

## 2025-06-02 DIAGNOSIS — R00.1 BRADYCARDIA: Primary | ICD-10-CM

## 2025-06-02 DIAGNOSIS — G47.33 OSA (OBSTRUCTIVE SLEEP APNEA): Primary | ICD-10-CM

## 2025-06-02 PROCEDURE — 99213 OFFICE O/P EST LOW 20 MIN: CPT

## 2025-06-02 ASSESSMENT — SLEEP AND FATIGUE QUESTIONNAIRES
ESS TOTAL SCORE: 9
HOW LIKELY ARE YOU TO NOD OFF OR FALL ASLEEP WHILE SITTING AND READING: MODERATE CHANCE OF DOZING
HOW LIKELY ARE YOU TO NOD OFF OR FALL ASLEEP WHEN YOU ARE A PASSENGER IN A CAR FOR AN HOUR WITHOUT A BREAK: SLIGHT CHANCE OF DOZING
HOW LIKELY ARE YOU TO NOD OFF OR FALL ASLEEP IN A CAR, WHILE STOPPED FOR A FEW MINUTES IN TRAFFIC: SLIGHT CHANCE OF DOZING
HOW LIKELY ARE YOU TO NOD OFF OR FALL ASLEEP WHILE SITTING AND TALKING TO SOMEONE: WOULD NEVER DOZE
HOW LIKELY ARE YOU TO NOD OFF OR FALL ASLEEP WHILE LYING DOWN TO REST IN THE AFTERNOON WHEN CIRCUMSTANCES PERMIT: SLIGHT CHANCE OF DOZING
HOW LIKELY ARE YOU TO NOD OFF OR FALL ASLEEP IN A CAR, WHILE STOPPED FOR A FEW MINUTES IN TRAFFIC: SLIGHT CHANCE OF DOZING
HOW LIKELY ARE YOU TO NOD OFF OR FALL ASLEEP WHILE SITTING AND READING: MODERATE CHANCE OF DOZING
HOW LIKELY ARE YOU TO NOD OFF OR FALL ASLEEP WHILE SITTING INACTIVE IN A PUBLIC PLACE: SLIGHT CHANCE OF DOZING
HOW LIKELY ARE YOU TO NOD OFF OR FALL ASLEEP WHILE LYING DOWN TO REST IN THE AFTERNOON WHEN CIRCUMSTANCES PERMIT: SLIGHT CHANCE OF DOZING
HOW LIKELY ARE YOU TO NOD OFF OR FALL ASLEEP WHEN YOU ARE A PASSENGER IN A CAR FOR AN HOUR WITHOUT A BREAK: SLIGHT CHANCE OF DOZING
HOW LIKELY ARE YOU TO NOD OFF OR FALL ASLEEP WHILE SITTING QUIETLY AFTER LUNCH WITHOUT ALCOHOL: SLIGHT CHANCE OF DOZING
HOW LIKELY ARE YOU TO NOD OFF OR FALL ASLEEP WHILE SITTING AND TALKING TO SOMEONE: WOULD NEVER DOZE
HOW LIKELY ARE YOU TO NOD OFF OR FALL ASLEEP WHILE WATCHING TV: MODERATE CHANCE OF DOZING
HOW LIKELY ARE YOU TO NOD OFF OR FALL ASLEEP WHILE SITTING QUIETLY AFTER LUNCH WITHOUT ALCOHOL: SLIGHT CHANCE OF DOZING
HOW LIKELY ARE YOU TO NOD OFF OR FALL ASLEEP WHILE SITTING INACTIVE IN A PUBLIC PLACE: SLIGHT CHANCE OF DOZING
HOW LIKELY ARE YOU TO NOD OFF OR FALL ASLEEP WHILE WATCHING TV: MODERATE CHANCE OF DOZING

## 2025-06-02 NOTE — PATIENT INSTRUCTIONS
Use bright light therapy in the evenings when you usually begin to experience sleepiness.  This can also be being outside.   (Ideally start it about 4 hours prior to your natural fall-asleep time.)     Little evidence for taking melatonin as soon as you wake up, could help delay sleep phase but concern would be causing daytime sleepiness.      Job DreamWear nasal pillows (size medium frame, small pillows).     It was great to see you again and take care Christina!  -Barbara      Never drive a car or operate a motorized vehicle while drowsy or sleepy.       Sleep Hygiene... Important practices for better sleep:  Avoid naps. This will ensure you are sleepy at bedtime.  If you have to take a nap, sleep 30 minutes or less before 3 pm.  Have a fixed bedtime and awakening time. The human body thrives on routines. Only deviate from these set times by no more than 1 hour, including on weekends.  Avoid exposure to electronics/screens within 2 hours of your desired sleep time. Light from screens inhibits melatonin production which stops our brain from helping us get to sleep.   Go to bed only when sleepy; this reduces the time you are awake in bed (which can lead to frustration and negative thoughts about sleep). If you can't fall asleep within 15-30 minutes, get up and do something boring until you feel sleepy again. Absolutely no electronic screens during this time.    Only use your bed for sleeping & intimacy. Do not use your bed as an office, workroom or recreation room.    Develop sleep rituals that you go through the same way every night.  Stay away from stimulants such as caffeine and nicotine. Caffeine can remain in your system for well over 10 hours, so no caffeine after lunch. Caffeine is found in tea, cola, coffee, cocoa and chocolate.    Avoid alcohol 2-4 hours before bedtime. As alcohol is metabolized, the result is a stimulant or \"wake-up\" effect and will cause fragmented sleep.   Regular exercise is

## 2025-06-02 NOTE — PROGRESS NOTES
HENT:  Oropharynx is clear and moist. Mallampati class III  Eyes: Pupils equal, round, and reactive to light. No scleral icterus.   Neck: No tracheal deviation present.  Circumference is 13 inches   Cardiovascular: Normal heart sounds.  No lower extremity edema.  Pulmonary/Chest: Clear breath sounds. No wheezes. No rhonchi. No rales. No decreased breath sounds.  No accessory muscle usage.   Musculoskeletal: No cyanosis. No clubbing.  Skin: Skin is warm and dry.   Psychiatric: Normal mood and affect.    DATA:  Review of PCP records    HST 2/12/24:  AHI 5.2.  SpO2 trey 84% with 0.9 min <90%.     ASSESSMENT:  DILMA, mild on HST AHI 5.2 - acclimating to CPAP, unfortunately no symptom benefit perceived thus far  Daytime sleepiness   Hypersomnia   Snoring, intermittent   Chronic fatigue  Nocturnal bradycardia - seen during admission and frequently recorded on apple watch, trey 38 bpm   Recurrent migraines - suspects to be related to menstrual cycle   Comorbid conditions: Hypothyroidism, migraines, HLD, iron deficiency anemia  Never smoker    PLAN:   APAP 4-6.6 cmH2O (tolerance). EPR now 3  Supplies - [facility].  AirFit N30i, wants to switch to No.1 Traveller DreamWear pillows (sm)   AirSense 11 set up 10/23/24.    Sleep hygiene & CPAP cleaning tips discussed & provided  Patient has been advised: no driving while sleepy; weight loss recommended.  Pathophysiology & complications of untreated DILMA & systemic benefits of CPAP therapy have been discussed.   Evening light to phase delay sleep   F/U 1 year

## 2025-06-11 ENCOUNTER — TELEPHONE (OUTPATIENT)
Dept: FAMILY MEDICINE CLINIC | Age: 50
End: 2025-06-11

## 2025-06-12 ENCOUNTER — ANCILLARY PROCEDURE (OUTPATIENT)
Dept: CARDIOLOGY CLINIC | Age: 50
End: 2025-06-12

## 2025-06-12 ENCOUNTER — TELEPHONE (OUTPATIENT)
Dept: CARDIOLOGY CLINIC | Age: 50
End: 2025-06-12

## 2025-06-12 DIAGNOSIS — R00.1 BRADYCARDIA: ICD-10-CM

## 2025-06-12 NOTE — TELEPHONE ENCOUNTER
Monitor placed by Aide MA  Monitor company [employer]  Length of monitor 14 days  Monitor ordered by ALMA Moody  Patch ID [id number]   Activation successful prior to pt leaving office? Yes

## 2025-06-12 NOTE — TELEPHONE ENCOUNTER
Submitted PA for Ubrelvy 100MG tablets   Via [facility] Key: [id number] STATUS: PENDING.    Follow up done daily; if no decision with in three days we will refax.  If another three days goes by with no decision will call the insurance for status.

## 2025-06-13 NOTE — TELEPHONE ENCOUNTER
Outcome    Prior Authorization is not required for this medication dosage form and strength at the quantity and days supply requested.     If this requires a response please respond to the pool ( P MHCX PSC MEDICATION PRE-AUTH).      Thank you please advise patient.

## 2025-06-14 DIAGNOSIS — G43.109 MIGRAINE WITH AURA AND WITHOUT STATUS MIGRAINOSUS, NOT INTRACTABLE: ICD-10-CM

## 2025-06-16 NOTE — TELEPHONE ENCOUNTER
Medication was sent in on 2/24/2025 #16 with 11 refills. Will need to call pharmacy when they open up to see if there are any refills left.

## 2025-06-17 DIAGNOSIS — G43.109 MIGRAINE WITH AURA AND WITHOUT STATUS MIGRAINOSUS, NOT INTRACTABLE: ICD-10-CM

## 2025-06-18 NOTE — TELEPHONE ENCOUNTER
Pharmacy stated that pt only got it once a few months ago and it was covered by a voucher, now needing a PA

## 2025-06-19 NOTE — TELEPHONE ENCOUNTER
This team does not recognize this workflow.  You must send PA requests VIA telephone encounter, or .  If you have any questions don't hesitate to reach out.  The encounter cannot be attached to a prescription refill, or RX request in any way.  That makes it show up in our inbox as an RX REQUEST which is not monitored and must be resent.

## 2025-06-19 NOTE — TELEPHONE ENCOUNTER
This message was not sent to PA department, not sure how it was seen.   Previous message was documented to show that PA department states no PA is needed.   Pharmacy notified.

## 2025-06-25 ENCOUNTER — TELEMEDICINE (OUTPATIENT)
Dept: FAMILY MEDICINE CLINIC | Age: 50
End: 2025-06-25
Payer: COMMERCIAL

## 2025-06-25 DIAGNOSIS — J20.9 ACUTE BRONCHITIS, UNSPECIFIED ORGANISM: Primary | ICD-10-CM

## 2025-06-25 PROCEDURE — 99213 OFFICE O/P EST LOW 20 MIN: CPT | Performed by: PHYSICIAN ASSISTANT

## 2025-06-25 RX ORDER — METHYLPREDNISOLONE 4 MG/1
TABLET ORAL
Qty: 1 KIT | Refills: 0 | Status: SHIPPED | OUTPATIENT
Start: 2025-06-25

## 2025-06-25 ASSESSMENT — ENCOUNTER SYMPTOMS
COUGH: 1
SINUS PAIN: 0
WHEEZING: 0
CHEST TIGHTNESS: 0
RHINORRHEA: 1
SINUS PRESSURE: 0
SORE THROAT: 0
SHORTNESS OF BREATH: 0

## 2025-06-25 NOTE — PROGRESS NOTES
Christina Thomas, was evaluated through a synchronous (real-time) audio-video encounter. The patient (or guardian if applicable) is aware that this is a billable service, which includes applicable co-pays. This Virtual Visit was conducted with patient's (and/or legal guardian's) consent. Patient identification was verified, and a caregiver was present when appropriate.   The patient was located at Home: [address]  [facility] [address]  Provider was located at Facility (Appt Dept): [address] [facility]  [address]  [place] [address]  Confirm you are appropriately licensed, registered, or certified to deliver care in the state where the patient is located as indicated above. If you are not or unsure, please re-schedule the visit: Yes, I confirm.     Christina Thomas (:  1975) is a Established patient, presenting virtually for evaluation of the following:      Below is the assessment and plan developed based on review of pertinent history, physical exam, labs, studies, and medications.     Assessment & Plan  Acute bronchitis, unspecified organism   Acute, start medrol dose pack and flonase, follow up if symptoms worsen or do not improve in one week and we can start an antibiotic    Orders:    methylPREDNISolone (MEDROL DOSEPACK) 4 MG tablet; Take by mouth.      No follow-ups on file.       Subjective   HPI  History of Present Illness  The patient presents via virtual visit for evaluation of a cough.    Cough  - Symptoms began on Friday, initially presenting as a severe sore throat, which was also experienced by her son and .  - By Friday night, she felt completely run down and flu-like.  - Her condition remained the same over the weekend, with  being particularly challenging.  - She reported feeling significantly better upon waking up on Monday, although she continued to experience some coughing.  - On Tuesday, her cough returned with full force, accompanied by nasal drainage and

## 2025-06-27 ENCOUNTER — PATIENT MESSAGE (OUTPATIENT)
Dept: FAMILY MEDICINE CLINIC | Age: 50
End: 2025-06-27

## 2025-06-27 RX ORDER — DEXTROMETHORPHAN HYDROBROMIDE AND PROMETHAZINE HYDROCHLORIDE 15; 6.25 MG/5ML; MG/5ML
5 SYRUP ORAL 4 TIMES DAILY PRN
Qty: 120 ML | Refills: 0 | Status: SHIPPED | OUTPATIENT
Start: 2025-06-27 | End: 2025-07-04

## 2025-07-08 ENCOUNTER — PATIENT MESSAGE (OUTPATIENT)
Dept: FAMILY MEDICINE CLINIC | Age: 50
End: 2025-07-08

## 2025-07-09 RX ORDER — FLUCONAZOLE 150 MG/1
150 TABLET ORAL
Qty: 2 TABLET | Refills: 0 | Status: SHIPPED | OUTPATIENT
Start: 2025-07-09 | End: 2025-07-15

## 2025-07-14 ENCOUNTER — OFFICE VISIT (OUTPATIENT)
Dept: ORTHOPEDIC SURGERY | Age: 50
End: 2025-07-14
Payer: COMMERCIAL

## 2025-07-14 VITALS — WEIGHT: 181 LBS | HEIGHT: 64 IN | BODY MASS INDEX: 30.9 KG/M2

## 2025-07-14 DIAGNOSIS — M25.812 SHOULDER IMPINGEMENT, LEFT: ICD-10-CM

## 2025-07-14 DIAGNOSIS — R52 PAIN: Primary | ICD-10-CM

## 2025-07-14 PROCEDURE — 99203 OFFICE O/P NEW LOW 30 MIN: CPT | Performed by: PHYSICIAN ASSISTANT

## 2025-07-14 PROCEDURE — 20611 DRAIN/INJ JOINT/BURSA W/US: CPT | Performed by: PHYSICIAN ASSISTANT

## 2025-07-14 RX ORDER — LIDOCAINE HYDROCHLORIDE 10 MG/ML
5 INJECTION, SOLUTION INFILTRATION; PERINEURAL ONCE
Status: COMPLETED | OUTPATIENT
Start: 2025-07-14 | End: 2025-07-14

## 2025-07-14 RX ORDER — TRIAMCINOLONE ACETONIDE 40 MG/ML
40 INJECTION, SUSPENSION INTRA-ARTICULAR; INTRAMUSCULAR ONCE
Status: COMPLETED | OUTPATIENT
Start: 2025-07-14 | End: 2025-07-14

## 2025-07-14 RX ORDER — BUPIVACAINE HYDROCHLORIDE 2.5 MG/ML
30 INJECTION, SOLUTION INFILTRATION; PERINEURAL ONCE
Status: COMPLETED | OUTPATIENT
Start: 2025-07-14 | End: 2025-07-14

## 2025-07-14 RX ADMIN — BUPIVACAINE HYDROCHLORIDE 75 MG: 2.5 INJECTION, SOLUTION INFILTRATION; PERINEURAL at 09:14

## 2025-07-14 RX ADMIN — TRIAMCINOLONE ACETONIDE 40 MG: 40 INJECTION, SUSPENSION INTRA-ARTICULAR; INTRAMUSCULAR at 09:14

## 2025-07-14 RX ADMIN — LIDOCAINE HYDROCHLORIDE 5 ML: 10 INJECTION, SOLUTION INFILTRATION; PERINEURAL at 09:14

## 2025-07-14 NOTE — PROGRESS NOTES
Dr Nicola Wan      Date /Time 7/14/2025             9:05 AM EDT  Name hCristina Thomas             1975   Location  [facility]  MRN [id number]                Chief Complaint   Patient presents with    New Patient     N L Shoulder         History of Present Illness    Christina Thomas is a 50 y.o. female who presents with  left Shoulder pain.    Sent in consultation by Jeffery Waters DO, .      Injury Mechanism:  none.  Worker's Comp. & legal issues:   none.  Previous Treatments: Ice, Heat, and NSAIDs    Patient presents to the office today for a new problem.  Patient here with a chief complaint of left shoulder pain.  Patient was seen in 2018 by Dr. Gian Castro.  She was diagnosed with a frozen shoulder and did receive a cortisone injection along with physical therapy.  Patient's pain has returned.  No new injury or trauma.  Patient is not a diabetic but does have hypothyroidism.    Past Medical History  Past Medical History:   Diagnosis Date    Anemia     iron supplement    Headache(784.0)     Herpes simplex without mention of complication     last outbreak 2 months ago      Hypothyroidism     Migraine     PONV (postoperative nausea and vomiting)     YEARS AGO W/GALLBLADDER SURGERY NOT RECENTLY    Sleep apnea     Wears contact lenses     Wears glasses      Past Surgical History:   Procedure Laterality Date    ACHILLES TENDON SURGERY Right 9/22/2020    MODIFIED BROSTROM PROCEDURE RIGHT ANKLE, TENOSYNOVECTOMY  POSTERIOR TIBIAL TENDON RIGHT, APPLICATION POSTERIOR  SPLINT RIGHT performed by Maura Miller DPM at [facility]    CHOLECYSTECTOMY, LAPAROSCOPIC  2007    DILATION AND CURETTAGE OF UTERUS  2009    FOOT SURGERY Left 2/10/2022    TENOSYNOVECTOMY POSTERIOR TIBIAL TENDON  , PLANTAR ENDOSCOPIC FASCIOTOMY LEFT FOOT, APPLICATION POSTERIOR SPLINT LEFT FOOT performed by Maura Miller DPM at [facility] OR    HERNIA REPAIR      HYSTERECTOMY (CERVIX STATUS UNKNOWN)

## 2025-08-13 ENCOUNTER — TELEPHONE (OUTPATIENT)
Dept: AUDIOLOGY | Age: 50
End: 2025-08-13

## 2025-08-31 DIAGNOSIS — G43.109 MIGRAINE WITH AURA AND WITHOUT STATUS MIGRAINOSUS, NOT INTRACTABLE: ICD-10-CM

## (undated) DEVICE — SUTURE ETHIBOND EXCEL 2-0 L30IN NONABSORBABLE GRN L26MM SH MX563

## (undated) DEVICE — PADDING CAST W4INXL4YD NONSTERILE COT RAYON MICROPLEATED

## (undated) DEVICE — CYSTO/BLADDER IRRIGATION SET, REGULATING CLAMP

## (undated) DEVICE — ELECTRODE ELECSURG L 10.2 CM PTFE COAT MONOPOLAR BLADE OPN

## (undated) DEVICE — CHLORAPREP 26ML ORANGE

## (undated) DEVICE — C-ARM: Brand: UNBRANDED

## (undated) DEVICE — SUTURE ETHLN SZ 4-0 L18IN NONABSORBABLE BLK L19MM PS-2 3/8 1667H

## (undated) DEVICE — SOLUTION IRRIG 1000ML 0.9% SOD CHL USP POUR PLAS BTL

## (undated) DEVICE — BOTTLE WASH WM SALINE SOLUTION 500ML

## (undated) DEVICE — HOOK RETRCT L5MM E SHRP SELF RET SYS LONE STAR

## (undated) DEVICE — SYRINGE MED 20ML STD CLR PLAS LUERLOCK TIP N CTRL DISP

## (undated) DEVICE — SUTURE VCRL SZ 3-0 L54IN ABSRB UD LIGAPAK REEL POLYGLACTIN J285G

## (undated) DEVICE — DRESSING,GAUZE,XEROFORM,CURAD,1"X8",ST: Brand: CURAD

## (undated) DEVICE — SUTURE NDL MAYO CATGUT 824S5

## (undated) DEVICE — TOTAL TRAY, DB, 100% SILI FOLEY, 16FR 10: Brand: MEDLINE

## (undated) DEVICE — SUTURE VCRL SZ 3-0 L27IN ABSRB UD L26MM SH 1/2 CIR J416H

## (undated) DEVICE — NEPTUNE E-SEP SMOKE EVACUATION PENCIL, COATED, 70MM BLADE, PUSH BUTTON SWITCH: Brand: NEPTUNE E-SEP

## (undated) DEVICE — PADDING CAST N ADH 12X6 IN CRIMPED FINISH 100% COTTON WBRLII

## (undated) DEVICE — TRAP FLUID

## (undated) DEVICE — GAUZE,PACKING STRIP,IODOFORM,2"X5YD,STRL: Brand: CURAD

## (undated) DEVICE — SPONGE,LAP,4"X18",XR,ST,5/PK,40PK/CS: Brand: MEDLINE INDUSTRIES, INC.

## (undated) DEVICE — ESMARK: Brand: DEROYAL

## (undated) DEVICE — SUTURE VICRYL + SZ 3-0 L27IN ABSRB UD L26MM SH 1/2 CIR VCP416H

## (undated) DEVICE — SURG GL, SENSICARE PI ORTHO LT,LF,PF,8.5: Brand: MEDLINE

## (undated) DEVICE — 9165 UNIVERSAL PATIENT PLATE: Brand: 3M™

## (undated) DEVICE — SUTURE PDS + SZ 2 0 L27IN ABSRB VLT L26MM CT 2 1 2 CIR PDP333H

## (undated) DEVICE — SEALER ENDOSCP NANO COAT OPN DIV CRV L JAW LIGASURE IMPACT

## (undated) DEVICE — SUTURE VICRYL + SZ 0 L18IN ABSRB VLT L26MM CT-2 1/2 CIR VCP727D

## (undated) DEVICE — BAG,DRAINAGE,UROLOGY,2000ML,ANTI REFLUX: Brand: MEDLINE

## (undated) DEVICE — UNDERPANTS INCONT XL 45-70IN KNIT SEAMLESS DSGN COLOR-CODED

## (undated) DEVICE — PREMIUM WET SKIN PREP TRAY: Brand: MEDLINE INDUSTRIES, INC.

## (undated) DEVICE — Z CONVERTED USE 2273164 BANDAGE COMPR W4INXL4 1/2YD E EC SGL LAYERED CLP CLSR ECONO

## (undated) DEVICE — RETROGRADE KNIFE BOX OF 6: Brand: ECTRA

## (undated) DEVICE — SUTURE VICRYL + SZ 2-0 L18IN ABSRB VLT L26MM SH 1/2 CIR VCP775D

## (undated) DEVICE — SUTURE ETHIBOND EXCEL SZ 2-0 L36IN NONABSORBABLE GRN SH-2 X559H

## (undated) DEVICE — STERILE POLYISOPRENE POWDER-FREE SURGICAL GLOVES: Brand: PROTEXIS

## (undated) DEVICE — NEEDLE,22GX1.5",REG,BEVEL: Brand: MEDLINE

## (undated) DEVICE — SUTURE VICRYL + SZ 2-0 L27IN ABSRB UD CT-2 L26MM 1/2 CIR TAPR VCP269H

## (undated) DEVICE — GENERAL: Brand: MEDLINE INDUSTRIES, INC.

## (undated) DEVICE — TOWEL,OR,DSP,ST,BLUE,DLX,4/PK,20PK/CS: Brand: MEDLINE

## (undated) DEVICE — SUTURE COAT VCRL SZ 4-0 L18IN ABSRB UD L19MM PS-2 1/2 CIR J496G

## (undated) DEVICE — SOLUTION IV IRRIG 500ML 0.9% SODIUM CHL 2F7123

## (undated) DEVICE — SUTURE VCRL SZ 2-0 L27IN ABSRB UD L26MM SH 1/2 CIR J417H

## (undated) DEVICE — RETRACTOR SURG W18.3XL32.5CM PLAS SELF RET W/ 2 CATH CLP

## (undated) DEVICE — SPONGE,LAP,18"X18",DLX,XR,ST,5/PK,40/PK: Brand: MEDLINE

## (undated) DEVICE — SUTURE MCRYL SZ 4-0 L27IN ABSRB UD L19MM PS-2 1/2 CIR PRIM Y426H

## (undated) DEVICE — PACK,LAPARASCOPY,PELVISCOPY,AURORA: Brand: MEDLINE

## (undated) DEVICE — Device

## (undated) DEVICE — CURITY STRETCH BANDAGE: Brand: CURITY

## (undated) DEVICE — KIT INSTR DRL GUID 1.6/1.35MM GUIDWIRE DISP FIBERTAK DX

## (undated) DEVICE — SPLINT ORTH W4XL30IN LAYERED FBRGLS FOAM PD BRTH BK MOLD

## (undated) DEVICE — ELECTRODE PT RET AD L9FT HI MOIST COND ADH HYDRGEL CORDED

## (undated) DEVICE — 3 ML SYRINGE LUER-LOCK TIP: Brand: MONOJECT